# Patient Record
Sex: FEMALE | Race: WHITE | NOT HISPANIC OR LATINO | Employment: UNEMPLOYED | URBAN - METROPOLITAN AREA
[De-identification: names, ages, dates, MRNs, and addresses within clinical notes are randomized per-mention and may not be internally consistent; named-entity substitution may affect disease eponyms.]

---

## 2018-09-12 LAB
CARDIOLIPIN IGA SER IA-ACNC: <9 APL U/ML (ref 0–11)
CARDIOLIPIN IGG SER IA-ACNC: <9 GPL U/ML (ref 0–14)
CARDIOLIPIN IGM SER IA-ACNC: 14 MPL U/ML (ref 0–12)
HOMOCYSTEINE: 6.8 UMOL/L (ref 0–15)

## 2018-09-13 LAB
DRVVT CONFIRM: 38.5 SEC (ref 0–47)
FACT VIII SPEC-IMP: 233 % (ref 57–163)
LUPUS ANTICOAGULANT INTERPRETATION: NORMAL
PTT-LA MIX: 33.3 SEC (ref 0–51.9)

## 2018-09-14 LAB
B2 GLYCOPROTEIN I IGA: <9 GPI IGA UNITS (ref 0–25)
B2 GLYCOPROTEIN I IGG: <9 GPI IGG UNITS (ref 0–20)
B2 GLYCOPROTEIN I IGM: <9 GPI IGM UNITS (ref 0–32)

## 2018-09-24 ENCOUNTER — TELEPHONE (OUTPATIENT)
Dept: HEMATOLOGY/ONCOLOGY | Facility: CLINIC | Age: 63
End: 2018-09-24

## 2018-09-24 ENCOUNTER — OFFICE VISIT (OUTPATIENT)
Dept: HEMATOLOGY/ONCOLOGY | Facility: CLINIC | Age: 63
End: 2018-09-24
Payer: MEDICAID

## 2018-09-24 VITALS
WEIGHT: 197.88 LBS | HEART RATE: 120 BPM | DIASTOLIC BLOOD PRESSURE: 70 MMHG | HEIGHT: 65 IN | BODY MASS INDEX: 32.97 KG/M2 | SYSTOLIC BLOOD PRESSURE: 101 MMHG | TEMPERATURE: 99 F | RESPIRATION RATE: 18 BRPM

## 2018-09-24 DIAGNOSIS — R76.0 ANTICARDIOLIPIN ANTIBODY POSITIVE: ICD-10-CM

## 2018-09-24 DIAGNOSIS — R79.1 ELEVATED FACTOR VIII LEVEL: ICD-10-CM

## 2018-09-24 DIAGNOSIS — D73.5 SPLENIC INFARCT: Primary | ICD-10-CM

## 2018-09-24 PROCEDURE — 99213 OFFICE O/P EST LOW 20 MIN: CPT | Mod: ,,, | Performed by: INTERNAL MEDICINE

## 2018-09-24 RX ORDER — ALBUTEROL SULFATE 0.83 MG/ML
2.5 SOLUTION RESPIRATORY (INHALATION) EVERY 6 HOURS PRN
COMMUNITY

## 2018-09-24 RX ORDER — LEVOTHYROXINE SODIUM 125 UG/1
125 TABLET ORAL DAILY
Refills: 2 | COMMUNITY
Start: 2018-08-04 | End: 2018-11-26

## 2018-09-24 RX ORDER — ACETAMINOPHEN 500 MG
500 TABLET ORAL EVERY 6 HOURS PRN
COMMUNITY

## 2018-09-24 RX ORDER — APIXABAN 5 MG/1
TABLET, FILM COATED ORAL
Refills: 0 | COMMUNITY
Start: 2018-09-11 | End: 2020-07-21

## 2018-09-24 NOTE — LETTER
September 24, 2018      Ruby Chahal, FNP  [address] [facility] [address]           [facility] - Hematology Oncology  [address]  [address]  [place] [address]  Phone: [phone]  Fax: [phone]          Patient: Clarita Gómez   MR Number: [id number]   YOB: 1955   Date of Visit: 9/24/2018       Dear Ruby Chahal:    Thank you for referring Clarita Gómez to me for evaluation. Attached you will find relevant portions of my assessment and plan of care.    If you have questions, please do not hesitate to call me. I look forward to following Clarita Gómez along with you.    Sincerely,    GUERO Mayer MD    Enclosure  CC:  No Recipients    If you would like to receive this communication electronically, please contact [email or web address] or [phone] to request more information on [employer] Link access.    For providers and/or their staff who would like to refer a patient to [facility], please contact us through our one-stop-shop provider referral line, [facility] , at [phone].    If you feel you have received this communication in error or would no longer like to receive these types of communications, please e-mail [email or web address]

## 2018-09-25 NOTE — PROGRESS NOTES
Presented with LUQ pain.  Had splenic artery thrombus with splenic infarct.  Pain 10 to 5 since discharge.  On Eliquis 5mg BID.    Coagulation evaluation increased Factor 8 and increased Anti cardiolipid antibody levels.    Estimate 30% risk of clot event over next 2-3 years.    Continue Eliquis 5mg BID therapeutic and prophylaxis.

## 2018-11-26 ENCOUNTER — OFFICE VISIT (OUTPATIENT)
Dept: HEMATOLOGY/ONCOLOGY | Facility: CLINIC | Age: 63
End: 2018-11-26
Payer: MEDICAID

## 2018-11-26 VITALS — RESPIRATION RATE: 20 BRPM | BODY MASS INDEX: 34.7 KG/M2 | TEMPERATURE: 98 F | WEIGHT: 208.5 LBS

## 2018-11-26 DIAGNOSIS — R76.0 ANTICARDIOLIPIN ANTIBODY POSITIVE: ICD-10-CM

## 2018-11-26 DIAGNOSIS — R76.0 ANTI-CARDIOLIPIN ANTIBODY POSITIVE: ICD-10-CM

## 2018-11-26 DIAGNOSIS — R79.1 ELEVATED FACTOR VIII LEVEL: ICD-10-CM

## 2018-11-26 DIAGNOSIS — D73.5 SPLENIC INFARCT: Primary | ICD-10-CM

## 2018-11-26 PROCEDURE — 99213 OFFICE O/P EST LOW 20 MIN: CPT | Mod: ,,, | Performed by: INTERNAL MEDICINE

## 2018-11-26 RX ORDER — LEVOTHYROXINE SODIUM 112 UG/1
TABLET ORAL
COMMUNITY

## 2018-11-26 NOTE — LETTER
November 26, 2018      Ruby Chahal, FNP  [address] [facility] [address]           [facility] - Hematology Oncology  [address]  [address]  [place] [address]  Phone: [phone]  Fax: [phone]          Patient: Clarita Gómez   MR Number: [id number]   YOB: 1955   Date of Visit: 11/26/2018       Dear Ruby Chahal:    Thank you for referring Clarita óGmez to me for evaluation. Attached you will find relevant portions of my assessment and plan of care.    If you have questions, please do not hesitate to call me. I look forward to following Clarita Gómez along with you.    Sincerely,    GUERO Mayer MD    Enclosure  CC:  No Recipients    If you would like to receive this communication electronically, please contact [email or web address] or [phone] to request more information on [employer] Link access.    For providers and/or their staff who would like to refer a patient to [facility], please contact us through our one-stop-shop provider referral line, [facility] , at [phone].    If you feel you have received this communication in error or would no longer like to receive these types of communications, please e-mail [email or web address]

## 2018-11-27 NOTE — PROGRESS NOTES
Presented with LUQ pain.  Had splenic artery thrombus with splenic infarct.  Pain 10 to 5 since discharge.  On Eliquis 5mg BID.    Coagulation evaluation increased Factor 8 and increased Anti cardiolipid antibody levels.    Estimate 30% risk of clot event over next 2-3 years.    Continue Eliquis 5mg BID therapeutic and prophylaxis.    She admits to blurred vision and dizzyness intermittently, sx predated the splenic infarct and have not changed while on Eliquis.    She is to follow up with Ms. Chahal for evaluation of sx. and primary care.    Plan to re check her CAT of abdomen and Factor 8 and anti cardiolipid antibody levels again 3/2019.    RTC 3/2019.

## 2019-03-15 ENCOUNTER — TELEPHONE (OUTPATIENT)
Dept: HEMATOLOGY/ONCOLOGY | Facility: CLINIC | Age: 64
End: 2019-03-15

## 2019-03-15 DIAGNOSIS — D73.5 SPLENIC INFARCT: ICD-10-CM

## 2019-03-15 DIAGNOSIS — R76.0 ANTI-CARDIOLIPIN ANTIBODY POSITIVE: Primary | ICD-10-CM

## 2019-03-15 NOTE — TELEPHONE ENCOUNTER
----- Message from Freya Lucero sent at 3/15/2019  1:51 PM CDT -----  Factor 8 and anti cardiolipid antibody levels -- labs need to be input for [facility] so pt can do labs prior to appt.    # [phone]

## 2019-03-20 ENCOUNTER — OFFICE VISIT (OUTPATIENT)
Dept: HEMATOLOGY/ONCOLOGY | Facility: CLINIC | Age: 64
End: 2019-03-20
Payer: MEDICAID

## 2019-03-20 VITALS
HEART RATE: 80 BPM | TEMPERATURE: 98 F | WEIGHT: 215.31 LBS | BODY MASS INDEX: 35.83 KG/M2 | SYSTOLIC BLOOD PRESSURE: 123 MMHG | DIASTOLIC BLOOD PRESSURE: 77 MMHG | RESPIRATION RATE: 20 BRPM

## 2019-03-20 DIAGNOSIS — D73.5 SPLENIC INFARCT: Primary | ICD-10-CM

## 2019-03-20 DIAGNOSIS — R10.12 LEFT UPPER QUADRANT PAIN: ICD-10-CM

## 2019-03-20 PROCEDURE — 99213 PR OFFICE/OUTPT VISIT, EST, LEVL III, 20-29 MIN: ICD-10-PCS | Mod: ,,, | Performed by: INTERNAL MEDICINE

## 2019-03-20 PROCEDURE — 99213 OFFICE O/P EST LOW 20 MIN: CPT | Mod: ,,, | Performed by: INTERNAL MEDICINE

## 2019-03-20 RX ORDER — FLUTICASONE FUROATE AND VILANTEROL 200; 25 UG/1; UG/1
1 POWDER RESPIRATORY (INHALATION) DAILY
COMMUNITY
End: 2021-03-31

## 2019-03-20 RX ORDER — PRAVASTATIN SODIUM 40 MG/1
40 TABLET ORAL DAILY
COMMUNITY
End: 2022-01-17 | Stop reason: SDUPTHER

## 2019-03-20 NOTE — LETTER
March 20, 2019      Ruby Chahal, FNP  [address]  [address]  [place] [address]           [facility] - Hematology Oncology  [address]  [address]  [place] [address]  Phone: [phone]  Fax: [phone]          Patient: Clarita Gómez   MR Number: [id number]   YOB: 1955   Date of Visit: 3/20/2019       Dear Ruby Chahal:    Thank you for referring Clarita Gómez to me for evaluation. Attached you will find relevant portions of my assessment and plan of care.    If you have questions, please do not hesitate to call me. I look forward to following Clarita Gómez along with you.    Sincerely,    GUERO Mayer MD    Enclosure  CC:  No Recipients    If you would like to receive this communication electronically, please contact [email or web address] or [phone] to request more information on [employer] access.    For providers and/or their staff who would like to refer a patient to [facility], please contact us through our one-stop-shop provider referral line, [facility], at [phone].    If you feel you have received this communication in error or would no longer like to receive these types of communications, please e-mail [email or web address]

## 2019-03-21 NOTE — PROGRESS NOTES
Presented with LUQ pain.  Had splenic artery thrombus with splenic infarct.  Pain 10 to 5 since discharge.  On Eliquis 5mg BID.    Coagulation evaluation increased Factor 8 and increased Anti cardiolipid antibody levels.    Estimate 30% risk of clot event over next 2-3 years.    Continue Eliquis 5mg BID therapeutic and prophylaxis.    She admits to blurred vision and dizzyness intermittently, sx predated the splenic infarct and have not changed while on Eliquis.    LUQ pain lasting 3-4 days, in 12/2018 and 2/2019., while on Eliquis 5mg BID.    Plan to re check her CAT of abdomen now regards status of splenic infarct.    RTC 2-3 weeks.

## 2019-03-25 ENCOUNTER — TELEPHONE (OUTPATIENT)
Dept: HEMATOLOGY/ONCOLOGY | Facility: CLINIC | Age: 64
End: 2019-03-25

## 2019-03-25 NOTE — TELEPHONE ENCOUNTER
Called patient to see if she completed CT and labs she stated that the insurance didn't get approved. She will call to reschedule her apppointment and she just spoke to someone at the office and canceled her appointment for Friday.

## 2019-04-11 LAB
ALBUMIN SERPL-MCNC: 4.4 G/DL (ref 3.1–4.7)
ALP SERPL-CCNC: 64 IU/L (ref 40–104)
ALT (SGPT): 21 IU/L (ref 3–33)
AST SERPL-CCNC: 26 IU/L (ref 10–40)
BASOPHILS NFR BLD: 0.1 K/UL (ref 0–0.2)
BASOPHILS NFR BLD: 0.8 %
BILIRUB SERPL-MCNC: 0.6 MG/DL (ref 0.3–1)
BUN SERPL-MCNC: 11 MG/DL (ref 8–20)
CALCIUM SERPL-MCNC: 9.7 MG/DL (ref 7.7–10.4)
CHLORIDE: 97 MMOL/L (ref 98–110)
CO2 SERPL-SCNC: 28.1 MMOL/L (ref 22.8–31.6)
CREATININE: 0.86 MG/DL (ref 0.6–1.4)
EOSINOPHIL NFR BLD: 0.5 K/UL (ref 0–0.7)
EOSINOPHIL NFR BLD: 5 %
ERYTHROCYTE [DISTWIDTH] IN BLOOD BY AUTOMATED COUNT: 13.6 % (ref 11.7–14.9)
GLUCOSE: 87 MG/DL (ref 70–99)
GRAN #: 5.3 K/UL (ref 1.4–6.5)
GRAN%: 58.3 %
HCT VFR BLD AUTO: 45.9 % (ref 36–48)
HGB BLD-MCNC: 14.7 G/DL (ref 12–15)
IMMATURE GRANS (ABS): 0 K/UL (ref 0–1)
IMMATURE GRANULOCYTES: 0.4 %
ISTAT CREATININE: 1 MG/DL (ref 0.6–1.4)
LYMPH #: 2.5 K/UL (ref 1.2–3.4)
LYMPH%: 27.6 %
MCH RBC QN AUTO: 29.5 PG (ref 25–35)
MCHC RBC AUTO-ENTMCNC: 32 G/DL (ref 31–36)
MCV RBC AUTO: 92 FL (ref 79–98)
MONO #: 0.7 K/UL (ref 0.1–0.6)
MONO%: 7.9 %
NUCLEATED RBCS: 0 %
PLATELET # BLD AUTO: 307 K/UL (ref 140–440)
PMV BLD AUTO: 9.6 FL (ref 8.8–12.7)
POTASSIUM SERPL-SCNC: 4.1 MMOL/L (ref 3.5–5)
PROT SERPL-MCNC: 8.5 G/DL (ref 6–8.2)
RBC # BLD AUTO: 4.99 M/UL (ref 3.5–5.5)
SODIUM: 136 MMOL/L (ref 134–144)
WBC # BLD AUTO: 9.1 K/UL (ref 5–10)

## 2019-04-12 LAB — FACT VIII SPEC-IMP: 209 % (ref 57–163)

## 2019-04-23 ENCOUNTER — OFFICE VISIT (OUTPATIENT)
Dept: HEMATOLOGY/ONCOLOGY | Facility: CLINIC | Age: 64
End: 2019-04-23
Payer: MEDICAID

## 2019-04-23 VITALS
SYSTOLIC BLOOD PRESSURE: 129 MMHG | TEMPERATURE: 99 F | WEIGHT: 217.31 LBS | BODY MASS INDEX: 36.16 KG/M2 | HEART RATE: 89 BPM | DIASTOLIC BLOOD PRESSURE: 81 MMHG | RESPIRATION RATE: 20 BRPM

## 2019-04-23 DIAGNOSIS — R76.0 ANTI-CARDIOLIPIN ANTIBODY POSITIVE: ICD-10-CM

## 2019-04-23 DIAGNOSIS — R79.1 ELEVATED FACTOR VIII LEVEL: ICD-10-CM

## 2019-04-23 DIAGNOSIS — D73.5 SPLENIC INFARCT: Primary | ICD-10-CM

## 2019-04-23 PROCEDURE — 99213 PR OFFICE/OUTPT VISIT, EST, LEVL III, 20-29 MIN: ICD-10-PCS | Mod: ,,, | Performed by: INTERNAL MEDICINE

## 2019-04-23 PROCEDURE — 99213 OFFICE O/P EST LOW 20 MIN: CPT | Mod: ,,, | Performed by: INTERNAL MEDICINE

## 2019-04-23 NOTE — LETTER
April 28, 2019      Ruby Chahal, FNP  [address]  [address]  [place] [address]           [facility] - Hematology Oncology  [address]  [address]  [place] [address]  Phone: [phone]  Fax: [phone]          Patient: Clarita Gómez   MR Number: [id number]   YOB: 1955   Date of Visit: 4/23/2019       Dear Ruby Chahal:    Thank you for referring Clarita Gómez to me for evaluation. Attached you will find relevant portions of my assessment and plan of care.    If you have questions, please do not hesitate to call me. I look forward to following Clarita Gómez along with you.    Sincerely,    GUERO Mayer MD    Enclosure  CC:  No Recipients    If you would like to receive this communication electronically, please contact [email or web address] or [phone] to request more information on [employer] Link access.    For providers and/or their staff who would like to refer a patient to [facility], please contact us through our one-stop-shop provider referral line, [facility], at [phone].    If you feel you have received this communication in error or would no longer like to receive these types of communications, please e-mail [email or web address]

## 2019-04-28 NOTE — PROGRESS NOTES
Presented with LUQ pain.  Had splenic artery thrombus with splenic infarct.  Pain 10 to 5 since discharge.  On Eliquis 5mg BID.    Coagulation evaluation increased Factor 8 and increased Anti cardiolipid antibody levels.    Estimate 30% risk of clot event over next 2-3 years.    Continue Eliquis 5mg BID therapeutic and prophylaxis.    She admits to blurred vision and dizzyness intermittently, sx predated the splenic infarct and have not changed while on Eliquis.    LUQ pain lasting 3-4 days, in 12/2018 and 2/2019., while on Eliquis 5mg BID.    Plan to re check her CAT of abdomen now regards status of splenic infarct showed resolution of splenic infarct area.    Continue Eliquis BID.  RTC 6 months.

## 2019-05-14 RX ORDER — APIXABAN 5 MG/1
TABLET, FILM COATED ORAL
Qty: 60 TABLET | Refills: 0 | OUTPATIENT
Start: 2019-05-14

## 2019-05-14 NOTE — TELEPHONE ENCOUNTER
----- Message from Shasta Al sent at 5/14/2019  2:48 PM CDT -----  The patient called and said she needs a refill of her Eliquis called in to [facility] on [address]. She said they have been faxing a request but we have not responded. Please call her back once it is called in to the pharmacy at [phone].

## 2019-07-13 LAB
CARDIOLIPIN IGA SER IA-ACNC: <9 APL U/ML (ref 0–11)
CARDIOLIPIN IGG SER IA-ACNC: <9 GPL U/ML (ref 0–14)
CARDIOLIPIN IGM SER IA-ACNC: 18 MPL U/ML (ref 0–12)

## 2019-07-14 LAB — FACT VIII SPEC-IMP: 202 % (ref 56–140)

## 2019-07-23 ENCOUNTER — OFFICE VISIT (OUTPATIENT)
Dept: HEMATOLOGY/ONCOLOGY | Facility: CLINIC | Age: 64
End: 2019-07-23
Payer: MEDICAID

## 2019-07-23 VITALS
RESPIRATION RATE: 20 BRPM | DIASTOLIC BLOOD PRESSURE: 82 MMHG | WEIGHT: 214.5 LBS | BODY MASS INDEX: 35.69 KG/M2 | TEMPERATURE: 98 F | HEART RATE: 91 BPM | SYSTOLIC BLOOD PRESSURE: 136 MMHG

## 2019-07-23 DIAGNOSIS — R10.12 LEFT UPPER QUADRANT PAIN: ICD-10-CM

## 2019-07-23 DIAGNOSIS — D73.5 SPLENIC INFARCT: Primary | ICD-10-CM

## 2019-07-23 DIAGNOSIS — R76.0 ANTI-CARDIOLIPIN ANTIBODY POSITIVE: ICD-10-CM

## 2019-07-23 DIAGNOSIS — R79.1 ELEVATED FACTOR VIII LEVEL: ICD-10-CM

## 2019-07-23 PROCEDURE — 99213 PR OFFICE/OUTPT VISIT, EST, LEVL III, 20-29 MIN: ICD-10-PCS | Mod: ,,, | Performed by: INTERNAL MEDICINE

## 2019-07-23 PROCEDURE — 99213 OFFICE O/P EST LOW 20 MIN: CPT | Mod: ,,, | Performed by: INTERNAL MEDICINE

## 2019-07-24 NOTE — PROGRESS NOTES
Presented with LUQ pain.  Had splenic artery thrombus with splenic infarct.  Pain 10 to 5 since discharge.  Continues to improve.  On Eliquis 5mg BID.    Coagulation evaluation increased Factor 8 and increased Anti cardiolipid IgM antibody levels.  Persistently increased on repeat determination.    Estimate 30% risk of clot event over next 2-3 years.    Continue Eliquis 5mg BID therapeutic and prophylaxis.    She admits to blurred vision and dizzyness intermittently, sx predated the splenic infarct and have not changed while on Eliquis.      Re check her CAT of abdomen  regards status of splenic infarct showed resolution of splenic infarct area.    Continue Eliquis BID.  RTC 3 months.

## 2019-11-25 ENCOUNTER — OFFICE VISIT (OUTPATIENT)
Dept: HEMATOLOGY/ONCOLOGY | Facility: CLINIC | Age: 64
End: 2019-11-25
Payer: MEDICAID

## 2019-11-25 VITALS
BODY MASS INDEX: 36.44 KG/M2 | WEIGHT: 219 LBS | RESPIRATION RATE: 18 BRPM | DIASTOLIC BLOOD PRESSURE: 83 MMHG | SYSTOLIC BLOOD PRESSURE: 134 MMHG | TEMPERATURE: 98 F | HEART RATE: 92 BPM

## 2019-11-25 DIAGNOSIS — R10.12 LEFT UPPER QUADRANT PAIN: ICD-10-CM

## 2019-11-25 DIAGNOSIS — R76.0 ANTI-CARDIOLIPIN ANTIBODY POSITIVE: ICD-10-CM

## 2019-11-25 DIAGNOSIS — D73.5 SPLENIC INFARCT: Primary | ICD-10-CM

## 2019-11-25 DIAGNOSIS — R79.1 ELEVATED FACTOR VIII LEVEL: ICD-10-CM

## 2019-11-25 PROCEDURE — 99213 OFFICE O/P EST LOW 20 MIN: CPT | Mod: S$GLB,,, | Performed by: INTERNAL MEDICINE

## 2019-11-25 PROCEDURE — 99213 PR OFFICE/OUTPT VISIT, EST, LEVL III, 20-29 MIN: ICD-10-PCS | Mod: S$GLB,,, | Performed by: INTERNAL MEDICINE

## 2019-11-25 NOTE — PROGRESS NOTES
Presented with LUQ pain.  Had splenic artery thrombus with splenic infarct.  Continues to improve.  She does get some intermittent left upper quadrant pain.  On Eliquis 5mg BID.    Coagulation evaluation increased Factor 8 and increased Anti cardiolipid IgM antibody levels.  Persistently increased on repeat determination.    Estimate 30% risk of clot event over next 2-3 years.    Continue Eliquis 5mg BID therapeutic and prophylaxis.    She admits to blurred vision and dizzyness intermittently, sx predated the splenic infarct and have not changed while on Eliquis.      Re check her CAT of abdomen  regards status of splenic infarct showed resolution of splenic infarct area.    Continue Eliquis BID.      She is seen nurse practitioner Gabriel for general care and for thyroid management.  She follows up here in 6 months.

## 2020-01-02 DIAGNOSIS — Z78.0 ASYMPTOMATIC MENOPAUSAL STATE: Primary | ICD-10-CM

## 2020-01-02 DIAGNOSIS — Z12.31 ENCOUNTER FOR SCREENING MAMMOGRAM FOR BREAST CANCER: Primary | ICD-10-CM

## 2020-01-03 ENCOUNTER — HOSPITAL ENCOUNTER (OUTPATIENT)
Dept: RADIOLOGY | Facility: HOSPITAL | Age: 65
Discharge: HOME | End: 2020-01-03
Attending: NURSE PRACTITIONER
Payer: MEDICARE

## 2020-01-03 VITALS — HEIGHT: 65 IN | WEIGHT: 218.94 LBS | BODY MASS INDEX: 36.48 KG/M2

## 2020-01-03 DIAGNOSIS — Z12.31 ENCOUNTER FOR SCREENING MAMMOGRAM FOR BREAST CANCER: ICD-10-CM

## 2020-01-03 PROCEDURE — 77067 SCR MAMMO BI INCL CAD: CPT | Mod: TC,PO

## 2020-02-10 ENCOUNTER — HOSPITAL ENCOUNTER (OUTPATIENT)
Dept: RADIOLOGY | Facility: HOSPITAL | Age: 65
Discharge: HOME | End: 2020-02-10
Attending: NURSE PRACTITIONER
Payer: MEDICARE

## 2020-02-10 DIAGNOSIS — Z78.0 ASYMPTOMATIC MENOPAUSAL STATE: ICD-10-CM

## 2020-02-10 PROCEDURE — 77080 DXA BONE DENSITY AXIAL: CPT | Mod: TC,PO

## 2020-06-15 ENCOUNTER — TELEPHONE (OUTPATIENT)
Dept: HEMATOLOGY/ONCOLOGY | Facility: CLINIC | Age: 65
End: 2020-06-15

## 2020-06-15 NOTE — TELEPHONE ENCOUNTER
----- Message from Shasta Al sent at 6/15/2020  9:55 AM CDT -----  The patient needs her Eliquis called in to [facility]. Please call the patient back at [phone].

## 2020-06-24 ENCOUNTER — LAB VISIT (OUTPATIENT)
Dept: LAB | Facility: HOSPITAL | Age: 65
End: 2020-06-24
Attending: INTERNAL MEDICINE
Payer: MEDICARE

## 2020-06-24 ENCOUNTER — OFFICE VISIT (OUTPATIENT)
Dept: HEMATOLOGY/ONCOLOGY | Facility: CLINIC | Age: 65
End: 2020-06-24
Payer: MEDICARE

## 2020-06-24 ENCOUNTER — TELEPHONE (OUTPATIENT)
Dept: HEMATOLOGY/ONCOLOGY | Facility: CLINIC | Age: 65
End: 2020-06-24

## 2020-06-24 VITALS
TEMPERATURE: 97 F | RESPIRATION RATE: 12 BRPM | WEIGHT: 230.38 LBS | SYSTOLIC BLOOD PRESSURE: 160 MMHG | HEART RATE: 93 BPM | BODY MASS INDEX: 38.34 KG/M2 | DIASTOLIC BLOOD PRESSURE: 80 MMHG

## 2020-06-24 DIAGNOSIS — D73.5 SPLENIC INFARCT: ICD-10-CM

## 2020-06-24 DIAGNOSIS — R76.0 ANTI-CARDIOLIPIN ANTIBODY POSITIVE: Primary | ICD-10-CM

## 2020-06-24 DIAGNOSIS — R76.0 ANTI-CARDIOLIPIN ANTIBODY POSITIVE: ICD-10-CM

## 2020-06-24 PROCEDURE — 99214 OFFICE O/P EST MOD 30 MIN: CPT | Mod: S$GLB,,, | Performed by: INTERNAL MEDICINE

## 2020-06-24 PROCEDURE — 86147 CARDIOLIPIN ANTIBODY EA IG: CPT | Mod: 59

## 2020-06-24 PROCEDURE — 85240 CLOT FACTOR VIII AHG 1 STAGE: CPT

## 2020-06-24 PROCEDURE — 99214 PR OFFICE/OUTPT VISIT, EST, LEVL IV, 30-39 MIN: ICD-10-PCS | Mod: S$GLB,,, | Performed by: INTERNAL MEDICINE

## 2020-06-24 PROCEDURE — 36415 COLL VENOUS BLD VENIPUNCTURE: CPT

## 2020-06-27 LAB — FACT VIII ACT/NOR PPP: 250 % (ref 56–140)

## 2020-06-28 LAB
CARDIOLIPIN IGA SER IA-ACNC: 13 MPL U/ML (ref 0–12)
CARDIOLIPIN IGG SER IA-ACNC: <9 GPL U/ML (ref 0–14)
CARDIOLIPIN IGM SER IA-ACNC: <9 APL U/ML (ref 0–11)

## 2020-06-28 NOTE — PROGRESS NOTES
[facility] hematology Oncology in office follow-up progress note  June 24 2020    C/o left upper quadrant pain      Presented with LUQ pain.  Had splenic artery thrombus with splenic infarct.  Continues to improve.  She does get some intermittent left upper quadrant pain.  On Eliquis 5mg BID.    Coagulation evaluation increased Factor 8 and increased Anti cardiolipid IgM antibody levels.  Persistently increased on repeat determination.  Recheck lab in 6 months at [facility].    Estimate 30% risk of clot event over next 2-3 years.    Continue Eliquis 5mg BID therapeutic and prophylaxis.    She admits to blurred vision and dizzyness intermittently, sx predated the splenic infarct and have not changed while on Eliquis.    History of COPD.      Re check her CAT of abdomen  regards status of splenic infarct showed resolution of splenic infarct area.    Continue Eliquis BID.      She is seen nurse practitioner Gabriel for general care and for thyroid management.     No acute distress, does not appear chronically ill.  She does not have palpable lymphadenopathy.  Her lungs are clear bilaterally.  She has regular rhythm without murmur.  Abdomen is flat without distention, abdomen is nontender without palpable hepatomegaly or splenomegaly or abdominal mass.  Calves are nontender without edema, no petechiae or purpura.  Neurologically grossly intact    History of splenic infarct with  Increased factor 8 and increased anticardiolipin antibody in the past.    Continue Eliquis 5 mg b.i.d..  Return to clinic with labs in 6 months.

## 2021-01-19 ENCOUNTER — OFFICE VISIT (OUTPATIENT)
Dept: HEMATOLOGY/ONCOLOGY | Facility: CLINIC | Age: 66
End: 2021-01-19
Payer: MEDICARE

## 2021-01-19 VITALS
BODY MASS INDEX: 39.51 KG/M2 | RESPIRATION RATE: 20 BRPM | WEIGHT: 237.38 LBS | HEART RATE: 98 BPM | TEMPERATURE: 98 F | SYSTOLIC BLOOD PRESSURE: 116 MMHG | DIASTOLIC BLOOD PRESSURE: 69 MMHG

## 2021-01-19 DIAGNOSIS — D73.5 SPLENIC INFARCT: ICD-10-CM

## 2021-01-19 DIAGNOSIS — R79.1 ELEVATED FACTOR VIII LEVEL: ICD-10-CM

## 2021-01-19 DIAGNOSIS — R10.12 LEFT UPPER QUADRANT PAIN: ICD-10-CM

## 2021-01-19 DIAGNOSIS — Z12.31 SCREENING MAMMOGRAM FOR HIGH-RISK PATIENT: Primary | ICD-10-CM

## 2021-01-19 DIAGNOSIS — R76.0 ANTI-CARDIOLIPIN ANTIBODY POSITIVE: Primary | ICD-10-CM

## 2021-01-19 PROCEDURE — 99214 OFFICE O/P EST MOD 30 MIN: CPT | Mod: S$GLB,,, | Performed by: INTERNAL MEDICINE

## 2021-01-19 PROCEDURE — 99214 PR OFFICE/OUTPT VISIT, EST, LEVL IV, 30-39 MIN: ICD-10-PCS | Mod: S$GLB,,, | Performed by: INTERNAL MEDICINE

## 2021-03-31 ENCOUNTER — OFFICE VISIT (OUTPATIENT)
Dept: PULMONOLOGY | Facility: CLINIC | Age: 66
End: 2021-03-31
Payer: MEDICARE

## 2021-03-31 DIAGNOSIS — J44.9 CHRONIC OBSTRUCTIVE PULMONARY DISEASE, UNSPECIFIED COPD TYPE: ICD-10-CM

## 2021-03-31 DIAGNOSIS — R76.0 ANTI-CARDIOLIPIN ANTIBODY POSITIVE: ICD-10-CM

## 2021-03-31 DIAGNOSIS — Z87.891 PERSONAL HISTORY OF TOBACCO USE, PRESENTING HAZARDS TO HEALTH: ICD-10-CM

## 2021-03-31 PROCEDURE — 99406 BEHAV CHNG SMOKING 3-10 MIN: CPT | Mod: ,,, | Performed by: INTERNAL MEDICINE

## 2021-03-31 PROCEDURE — 99204 OFFICE O/P NEW MOD 45 MIN: CPT | Mod: 25,S$GLB,, | Performed by: INTERNAL MEDICINE

## 2021-03-31 PROCEDURE — 99204 PR OFFICE/OUTPT VISIT, NEW, LEVL IV, 45-59 MIN: ICD-10-PCS | Mod: 25,S$GLB,, | Performed by: INTERNAL MEDICINE

## 2021-03-31 PROCEDURE — 99406 PR TOBACCO USE CESSATION INTERMEDIATE 3-10 MINUTES: ICD-10-PCS | Mod: ,,, | Performed by: INTERNAL MEDICINE

## 2021-03-31 RX ORDER — ERGOCALCIFEROL 1.25 MG/1
50000 CAPSULE ORAL
COMMUNITY
Start: 2021-03-15 | End: 2023-12-22

## 2021-03-31 RX ORDER — PRAVASTATIN SODIUM 40 MG/1
TABLET ORAL
COMMUNITY

## 2021-03-31 RX ORDER — BUDESONIDE AND FORMOTEROL FUMARATE DIHYDRATE 160; 4.5 UG/1; UG/1
2 AEROSOL RESPIRATORY (INHALATION) EVERY 12 HOURS
COMMUNITY
End: 2021-06-07 | Stop reason: ALTCHOICE

## 2021-04-01 DIAGNOSIS — Z12.31 ENCOUNTER FOR SCREENING MAMMOGRAM FOR MALIGNANT NEOPLASM OF BREAST: Primary | ICD-10-CM

## 2021-04-15 ENCOUNTER — HOSPITAL ENCOUNTER (OUTPATIENT)
Dept: RADIOLOGY | Facility: HOSPITAL | Age: 66
Discharge: HOME | End: 2021-04-15
Attending: INTERNAL MEDICINE
Payer: MEDICARE

## 2021-04-15 ENCOUNTER — HOSPITAL ENCOUNTER (OUTPATIENT)
Dept: RADIOLOGY | Facility: HOSPITAL | Age: 66
Discharge: HOME | End: 2021-04-15
Attending: NURSE PRACTITIONER
Payer: MEDICARE

## 2021-04-15 DIAGNOSIS — Z87.891 PERSONAL HISTORY OF TOBACCO USE, PRESENTING HAZARDS TO HEALTH: ICD-10-CM

## 2021-04-15 DIAGNOSIS — Z12.31 ENCOUNTER FOR SCREENING MAMMOGRAM FOR MALIGNANT NEOPLASM OF BREAST: ICD-10-CM

## 2021-04-15 PROCEDURE — 77067 SCR MAMMO BI INCL CAD: CPT | Mod: TC,PO

## 2021-04-15 PROCEDURE — 71271 CT THORAX LUNG CANCER SCR C-: CPT | Mod: TC,PO

## 2021-05-05 ENCOUNTER — HOSPITAL ENCOUNTER (OUTPATIENT)
Dept: PULMONOLOGY | Facility: HOSPITAL | Age: 66
Discharge: HOME | End: 2021-05-05
Attending: INTERNAL MEDICINE
Payer: MEDICARE

## 2021-05-05 VITALS — BODY MASS INDEX: 38.49 KG/M2 | WEIGHT: 231 LBS | HEIGHT: 65 IN

## 2021-05-05 DIAGNOSIS — J44.9 CHRONIC OBSTRUCTIVE PULMONARY DISEASE, UNSPECIFIED COPD TYPE: ICD-10-CM

## 2021-05-05 PROCEDURE — 94727 GAS DIL/WSHOT DETER LNG VOL: CPT

## 2021-05-05 PROCEDURE — 94729 DIFFUSING CAPACITY: CPT

## 2021-05-05 PROCEDURE — 94060 EVALUATION OF WHEEZING: CPT

## 2021-05-05 PROCEDURE — 94618 PULMONARY STRESS TESTING: CPT

## 2021-05-05 PROCEDURE — 94010 BREATHING CAPACITY TEST: CPT

## 2021-05-10 ENCOUNTER — OFFICE VISIT (OUTPATIENT)
Dept: PULMONOLOGY | Facility: CLINIC | Age: 66
End: 2021-05-10
Payer: MEDICARE

## 2021-05-10 VITALS
DIASTOLIC BLOOD PRESSURE: 72 MMHG | BODY MASS INDEX: 36.28 KG/M2 | WEIGHT: 218 LBS | SYSTOLIC BLOOD PRESSURE: 140 MMHG | OXYGEN SATURATION: 94 % | HEART RATE: 94 BPM

## 2021-05-10 DIAGNOSIS — R09.02 HYPOXEMIA: ICD-10-CM

## 2021-05-10 DIAGNOSIS — J44.9 CHRONIC OBSTRUCTIVE PULMONARY DISEASE, UNSPECIFIED COPD TYPE: ICD-10-CM

## 2021-05-10 PROCEDURE — 99214 OFFICE O/P EST MOD 30 MIN: CPT | Mod: S$GLB,,, | Performed by: INTERNAL MEDICINE

## 2021-05-10 PROCEDURE — 99214 PR OFFICE/OUTPT VISIT, EST, LEVL IV, 30-39 MIN: ICD-10-PCS | Mod: S$GLB,,, | Performed by: INTERNAL MEDICINE

## 2021-06-07 ENCOUNTER — TELEPHONE (OUTPATIENT)
Dept: PULMONOLOGY | Facility: CLINIC | Age: 66
End: 2021-06-07

## 2021-07-19 ENCOUNTER — OFFICE VISIT (OUTPATIENT)
Dept: HEMATOLOGY/ONCOLOGY | Facility: CLINIC | Age: 66
End: 2021-07-19
Payer: MEDICARE

## 2021-07-19 VITALS
TEMPERATURE: 98 F | DIASTOLIC BLOOD PRESSURE: 72 MMHG | HEART RATE: 99 BPM | BODY MASS INDEX: 36.91 KG/M2 | WEIGHT: 221.81 LBS | SYSTOLIC BLOOD PRESSURE: 107 MMHG | RESPIRATION RATE: 18 BRPM

## 2021-07-19 DIAGNOSIS — D73.5 SPLENIC INFARCT: Primary | ICD-10-CM

## 2021-07-19 DIAGNOSIS — J44.9 CHRONIC OBSTRUCTIVE PULMONARY DISEASE, UNSPECIFIED COPD TYPE: ICD-10-CM

## 2021-07-19 DIAGNOSIS — R79.1 ELEVATED FACTOR VIII LEVEL: ICD-10-CM

## 2021-07-19 DIAGNOSIS — R76.0 ANTI-CARDIOLIPIN ANTIBODY POSITIVE: ICD-10-CM

## 2021-07-19 PROCEDURE — 99214 OFFICE O/P EST MOD 30 MIN: CPT | Mod: S$GLB,,, | Performed by: INTERNAL MEDICINE

## 2021-07-19 PROCEDURE — 99214 PR OFFICE/OUTPT VISIT, EST, LEVL IV, 30-39 MIN: ICD-10-PCS | Mod: S$GLB,,, | Performed by: INTERNAL MEDICINE

## 2021-08-16 ENCOUNTER — OFFICE VISIT (OUTPATIENT)
Dept: PULMONOLOGY | Facility: CLINIC | Age: 66
End: 2021-08-16
Payer: MEDICARE

## 2021-08-16 VITALS
DIASTOLIC BLOOD PRESSURE: 58 MMHG | WEIGHT: 224 LBS | BODY MASS INDEX: 37.28 KG/M2 | OXYGEN SATURATION: 96 % | HEART RATE: 92 BPM | SYSTOLIC BLOOD PRESSURE: 100 MMHG

## 2021-08-16 DIAGNOSIS — J44.9 CHRONIC OBSTRUCTIVE PULMONARY DISEASE, UNSPECIFIED COPD TYPE: ICD-10-CM

## 2021-08-16 DIAGNOSIS — Z87.891 PERSONAL HISTORY OF TOBACCO USE, PRESENTING HAZARDS TO HEALTH: ICD-10-CM

## 2021-08-16 DIAGNOSIS — R09.02 HYPOXEMIA: ICD-10-CM

## 2021-08-16 PROCEDURE — 99214 PR OFFICE/OUTPT VISIT, EST, LEVL IV, 30-39 MIN: ICD-10-PCS | Mod: 25,S$GLB,, | Performed by: INTERNAL MEDICINE

## 2021-08-16 PROCEDURE — 99406 BEHAV CHNG SMOKING 3-10 MIN: CPT | Mod: ,,, | Performed by: INTERNAL MEDICINE

## 2021-08-16 PROCEDURE — 99406 PR TOBACCO USE CESSATION INTERMEDIATE 3-10 MINUTES: ICD-10-PCS | Mod: ,,, | Performed by: INTERNAL MEDICINE

## 2021-08-16 PROCEDURE — 99214 OFFICE O/P EST MOD 30 MIN: CPT | Mod: 25,S$GLB,, | Performed by: INTERNAL MEDICINE

## 2021-11-15 ENCOUNTER — OFFICE VISIT (OUTPATIENT)
Dept: PULMONOLOGY | Facility: CLINIC | Age: 66
End: 2021-11-15
Payer: MEDICARE

## 2021-11-15 VITALS
OXYGEN SATURATION: 97 % | WEIGHT: 222 LBS | SYSTOLIC BLOOD PRESSURE: 108 MMHG | BODY MASS INDEX: 36.94 KG/M2 | HEART RATE: 94 BPM | DIASTOLIC BLOOD PRESSURE: 62 MMHG

## 2021-11-15 DIAGNOSIS — J44.9 CHRONIC OBSTRUCTIVE PULMONARY DISEASE, UNSPECIFIED COPD TYPE: ICD-10-CM

## 2021-11-15 DIAGNOSIS — Z87.891 PERSONAL HISTORY OF TOBACCO USE, PRESENTING HAZARDS TO HEALTH: ICD-10-CM

## 2021-11-15 DIAGNOSIS — R09.02 HYPOXEMIA: ICD-10-CM

## 2021-11-15 PROCEDURE — 99214 PR OFFICE/OUTPT VISIT, EST, LEVL IV, 30-39 MIN: ICD-10-PCS | Mod: 25,S$GLB,, | Performed by: INTERNAL MEDICINE

## 2021-11-15 PROCEDURE — 90662 FLU VACCINE - QUADRIVALENT - HIGH DOSE (65+) PRESERVATIVE FREE IM: ICD-10-PCS | Mod: S$GLB,,, | Performed by: INTERNAL MEDICINE

## 2021-11-15 PROCEDURE — 90662 IIV NO PRSV INCREASED AG IM: CPT | Mod: S$GLB,,, | Performed by: INTERNAL MEDICINE

## 2021-11-15 PROCEDURE — 99214 OFFICE O/P EST MOD 30 MIN: CPT | Mod: 25,S$GLB,, | Performed by: INTERNAL MEDICINE

## 2021-11-15 PROCEDURE — G0008 FLU VACCINE - QUADRIVALENT - HIGH DOSE (65+) PRESERVATIVE FREE IM: ICD-10-PCS | Mod: S$GLB,,, | Performed by: INTERNAL MEDICINE

## 2021-11-15 PROCEDURE — G0008 ADMIN INFLUENZA VIRUS VAC: HCPCS | Mod: S$GLB,,, | Performed by: INTERNAL MEDICINE

## 2021-11-15 RX ORDER — NYSTATIN 100000 [USP'U]/ML
5 SUSPENSION ORAL 4 TIMES DAILY
Qty: 200 ML | Refills: 0 | Status: SHIPPED | OUTPATIENT
Start: 2021-11-15 | End: 2021-11-25

## 2021-11-29 ENCOUNTER — TELEPHONE (OUTPATIENT)
Dept: PULMONOLOGY | Facility: CLINIC | Age: 66
End: 2021-11-29
Payer: MEDICARE

## 2021-11-29 RX ORDER — BUDESONIDE, GLYCOPYRROLATE, AND FORMOTEROL FUMARATE 160; 9; 4.8 UG/1; UG/1; UG/1
2 AEROSOL, METERED RESPIRATORY (INHALATION) 2 TIMES DAILY
Qty: 10.7 G | Refills: 5 | Status: SHIPPED | OUTPATIENT
Start: 2021-11-29 | End: 2022-08-31 | Stop reason: SDUPTHER

## 2022-01-17 ENCOUNTER — OFFICE VISIT (OUTPATIENT)
Dept: HEMATOLOGY/ONCOLOGY | Facility: CLINIC | Age: 67
End: 2022-01-17
Payer: MEDICARE

## 2022-01-17 VITALS
WEIGHT: 223 LBS | SYSTOLIC BLOOD PRESSURE: 111 MMHG | HEART RATE: 78 BPM | BODY MASS INDEX: 35.84 KG/M2 | TEMPERATURE: 98 F | HEIGHT: 66 IN | RESPIRATION RATE: 20 BRPM | DIASTOLIC BLOOD PRESSURE: 67 MMHG

## 2022-01-17 DIAGNOSIS — D73.5 SPLENIC INFARCT: ICD-10-CM

## 2022-01-17 DIAGNOSIS — R76.0 ANTI-CARDIOLIPIN ANTIBODY POSITIVE: Primary | ICD-10-CM

## 2022-01-17 PROCEDURE — 99214 PR OFFICE/OUTPT VISIT, EST, LEVL IV, 30-39 MIN: ICD-10-PCS | Mod: S$GLB,,, | Performed by: INTERNAL MEDICINE

## 2022-01-17 PROCEDURE — 99214 OFFICE O/P EST MOD 30 MIN: CPT | Mod: S$GLB,,, | Performed by: INTERNAL MEDICINE

## 2022-01-17 RX ORDER — APIXABAN 5 MG/1
TABLET, FILM COATED ORAL 2 TIMES DAILY
COMMUNITY
Start: 2022-01-14 | End: 2022-06-15

## 2022-01-17 NOTE — PROGRESS NOTES
[facility] hematology Oncology in office subsequent encounter note  1/17/22  BRENDA Rios        Presented with LUQ pain.  Had splenic artery thrombus with splenic infarct.  On Eliquis 5mg BID.  No LUQ pain sx now.    Coagulation evaluation increased Factor 8 and increased Anti cardiolipid IgM antibody levels.  Persistently increased on repeat determination.    Estimate 30% risk of clot event over next 2-3 years.    Continue Eliquis 5mg BID therapeutic and prophylaxis.    She admits to blurred vision and dizzyness intermittently, sx predated the splenic infarct and have not changed while on Eliquis.    History of COPD.  On 02 24/7.      Re check her CAT of abdomen  regards status of splenic infarct showed resolution of splenic infarct area. 2019.    Continue Eliquis BID.      She is seen nurse practitioner Gabriel for general care and for thyroid management.     Her son had COVID, but was asymptomatic.  She did not have symptoms and isolated herself.    She has had 2 of 2 vaccines and the booster shot and the flu shot.  She received the pneumonia vaccination 2 years ago.  She has not taken the shingles vaccine.    She has COPD and is on oxygen 2 liters/minute 24/7.     No acute distress, does not appear chronically ill.  She does not have palpable lymphadenopathy.  Her lungs are clear bilaterally.  She has regular rhythm without murmur.  Abdomen is flat without distention, abdomen is nontender without palpable hepatomegaly or splenomegaly or abdominal mass.  Calves are nontender without edema, no petechiae or purpura.  Neurologically grossly intact    History of splenic infarct with  Increased factor 8 at 250 and increased anticardiolipin antibody at 13.  Now.    Continue Eliquis 5 mg b.i.d..  Return to clinic in 6 months.  Recheck factor 8 and anticardiolipin antibody in July 2022.

## 2022-03-16 ENCOUNTER — TELEPHONE (OUTPATIENT)
Dept: HEMATOLOGY/ONCOLOGY | Facility: CLINIC | Age: 67
End: 2022-03-16

## 2022-03-16 ENCOUNTER — LAB VISIT (OUTPATIENT)
Dept: LAB | Facility: HOSPITAL | Age: 67
End: 2022-03-16
Attending: INTERNAL MEDICINE
Payer: MEDICARE

## 2022-03-16 ENCOUNTER — OFFICE VISIT (OUTPATIENT)
Dept: HEMATOLOGY/ONCOLOGY | Facility: CLINIC | Age: 67
End: 2022-03-16
Payer: MEDICARE

## 2022-03-16 VITALS
SYSTOLIC BLOOD PRESSURE: 72 MMHG | HEART RATE: 90 BPM | HEIGHT: 63 IN | BODY MASS INDEX: 39.34 KG/M2 | WEIGHT: 222 LBS | TEMPERATURE: 98 F | DIASTOLIC BLOOD PRESSURE: 47 MMHG | RESPIRATION RATE: 22 BRPM

## 2022-03-16 DIAGNOSIS — N18.2 ANEMIA ASSOCIATED WITH STAGE 2 CHRONIC RENAL FAILURE: ICD-10-CM

## 2022-03-16 DIAGNOSIS — D50.0 IRON DEFICIENCY ANEMIA DUE TO CHRONIC BLOOD LOSS: ICD-10-CM

## 2022-03-16 DIAGNOSIS — R32 INCONTINENCE IN FEMALE: ICD-10-CM

## 2022-03-16 DIAGNOSIS — D53.9 NON MEGALOBLASTIC ANEMIA ASSOCIATED WITH NUTRITIONAL DEFICIENCY: ICD-10-CM

## 2022-03-16 DIAGNOSIS — E03.9 ACQUIRED HYPOTHYROIDISM: ICD-10-CM

## 2022-03-16 DIAGNOSIS — R32 INCONTINENCE IN FEMALE: Primary | ICD-10-CM

## 2022-03-16 DIAGNOSIS — D63.1 ANEMIA ASSOCIATED WITH STAGE 2 CHRONIC RENAL FAILURE: ICD-10-CM

## 2022-03-16 DIAGNOSIS — R74.8 ABNORMAL LEVELS OF OTHER SERUM ENZYMES: ICD-10-CM

## 2022-03-16 DIAGNOSIS — D50.0 IRON DEFICIENCY ANEMIA DUE TO CHRONIC BLOOD LOSS: Primary | ICD-10-CM

## 2022-03-16 LAB
ABO + RH BLD: NORMAL
ALBUMIN SERPL BCP-MCNC: 3.9 G/DL (ref 3.5–5.2)
ALP SERPL-CCNC: 53 U/L (ref 55–135)
ALT SERPL W/O P-5'-P-CCNC: 12 U/L (ref 10–44)
ANION GAP SERPL CALC-SCNC: 4 MMOL/L (ref 8–16)
AST SERPL-CCNC: 22 U/L (ref 10–40)
BASOPHILS # BLD AUTO: 0.13 K/UL (ref 0–0.2)
BASOPHILS NFR BLD: 1 % (ref 0–1.9)
BILIRUB SERPL-MCNC: 0.4 MG/DL (ref 0.1–1)
BLD GP AB SCN CELLS X3 SERPL QL: NORMAL
BUN SERPL-MCNC: 6 MG/DL (ref 8–23)
CALCIUM SERPL-MCNC: 8.8 MG/DL (ref 8.7–10.5)
CHLORIDE SERPL-SCNC: 103 MMOL/L (ref 95–110)
CO2 SERPL-SCNC: 24 MMOL/L (ref 23–29)
CREAT SERPL-MCNC: 0.8 MG/DL (ref 0.5–1.4)
DIFFERENTIAL METHOD: ABNORMAL
EOSINOPHIL # BLD AUTO: 0.2 K/UL (ref 0–0.5)
EOSINOPHIL NFR BLD: 1.6 % (ref 0–8)
ERYTHROCYTE [DISTWIDTH] IN BLOOD BY AUTOMATED COUNT: 18.6 % (ref 11.5–14.5)
EST. GFR  (AFRICAN AMERICAN): >60 ML/MIN/1.73 M^2
EST. GFR  (NON AFRICAN AMERICAN): >60 ML/MIN/1.73 M^2
FERRITIN SERPL-MCNC: 5 NG/ML (ref 20–300)
FOLATE SERPL-MCNC: 6.2 NG/ML (ref 4–24)
GLUCOSE SERPL-MCNC: 100 MG/DL (ref 70–110)
HCT VFR BLD AUTO: 26.1 % (ref 37–48.5)
HGB BLD-MCNC: 6.9 G/DL (ref 12–16)
IMM GRANULOCYTES # BLD AUTO: 0.05 K/UL (ref 0–0.04)
IMM GRANULOCYTES NFR BLD AUTO: 0.4 % (ref 0–0.5)
LYMPHOCYTES # BLD AUTO: 2.5 K/UL (ref 1–4.8)
LYMPHOCYTES NFR BLD: 19.3 % (ref 18–48)
MCH RBC QN AUTO: 17.1 PG (ref 27–31)
MCHC RBC AUTO-ENTMCNC: 26.4 G/DL (ref 32–36)
MCV RBC AUTO: 65 FL (ref 82–98)
MONOCYTES # BLD AUTO: 1.1 K/UL (ref 0.3–1)
MONOCYTES NFR BLD: 8.5 % (ref 4–15)
NEUTROPHILS # BLD AUTO: 8.9 K/UL (ref 1.8–7.7)
NEUTROPHILS NFR BLD: 69.2 % (ref 38–73)
NRBC BLD-RTO: 0 /100 WBC
PLATELET # BLD AUTO: 459 K/UL (ref 150–450)
PMV BLD AUTO: 8.8 FL (ref 9.2–12.9)
POTASSIUM SERPL-SCNC: 3.6 MMOL/L (ref 3.5–5.1)
PROT SERPL-MCNC: 7.8 G/DL (ref 6–8.4)
RBC # BLD AUTO: 4.03 M/UL (ref 4–5.4)
RETICS/RBC NFR AUTO: 1.6 % (ref 0.5–2.5)
SCHISTOCYTES BLD QL SMEAR: ABNORMAL
SODIUM SERPL-SCNC: 131 MMOL/L (ref 136–145)
TSH SERPL DL<=0.005 MIU/L-ACNC: 1.57 UIU/ML (ref 0.34–5.6)
VIT B12 SERPL-MCNC: 96 PG/ML (ref 210–950)
WBC # BLD AUTO: 12.8 K/UL (ref 3.9–12.7)

## 2022-03-16 PROCEDURE — 82728 ASSAY OF FERRITIN: CPT | Performed by: INTERNAL MEDICINE

## 2022-03-16 PROCEDURE — 99214 PR OFFICE/OUTPT VISIT, EST, LEVL IV, 30-39 MIN: ICD-10-PCS | Mod: S$GLB,,, | Performed by: INTERNAL MEDICINE

## 2022-03-16 PROCEDURE — 84443 ASSAY THYROID STIM HORMONE: CPT | Performed by: INTERNAL MEDICINE

## 2022-03-16 PROCEDURE — 84207 ASSAY OF VITAMIN B-6: CPT | Performed by: INTERNAL MEDICINE

## 2022-03-16 PROCEDURE — 80053 COMPREHEN METABOLIC PANEL: CPT | Performed by: INTERNAL MEDICINE

## 2022-03-16 PROCEDURE — 82746 ASSAY OF FOLIC ACID SERUM: CPT | Performed by: INTERNAL MEDICINE

## 2022-03-16 PROCEDURE — 86901 BLOOD TYPING SEROLOGIC RH(D): CPT | Performed by: INTERNAL MEDICINE

## 2022-03-16 PROCEDURE — 36415 COLL VENOUS BLD VENIPUNCTURE: CPT | Performed by: INTERNAL MEDICINE

## 2022-03-16 PROCEDURE — 82607 VITAMIN B-12: CPT | Performed by: INTERNAL MEDICINE

## 2022-03-16 PROCEDURE — 85045 AUTOMATED RETICULOCYTE COUNT: CPT | Performed by: INTERNAL MEDICINE

## 2022-03-16 PROCEDURE — 85025 COMPLETE CBC W/AUTO DIFF WBC: CPT | Performed by: INTERNAL MEDICINE

## 2022-03-16 PROCEDURE — 82668 ASSAY OF ERYTHROPOIETIN: CPT | Performed by: INTERNAL MEDICINE

## 2022-03-16 PROCEDURE — 99214 OFFICE O/P EST MOD 30 MIN: CPT | Mod: S$GLB,,, | Performed by: INTERNAL MEDICINE

## 2022-03-16 RX ORDER — HEPARIN 100 UNIT/ML
500 SYRINGE INTRAVENOUS
Status: CANCELLED | OUTPATIENT
Start: 2022-03-18

## 2022-03-16 RX ORDER — DIPHENHYDRAMINE HYDROCHLORIDE 50 MG/ML
50 INJECTION INTRAMUSCULAR; INTRAVENOUS ONCE AS NEEDED
Status: CANCELLED | OUTPATIENT
Start: 2022-03-18

## 2022-03-16 RX ORDER — SODIUM CHLORIDE 0.9 % (FLUSH) 0.9 %
10 SYRINGE (ML) INJECTION
Status: CANCELLED | OUTPATIENT
Start: 2022-03-18

## 2022-03-16 RX ORDER — EPINEPHRINE 0.3 MG/.3ML
0.3 INJECTION SUBCUTANEOUS ONCE AS NEEDED
Status: CANCELLED | OUTPATIENT
Start: 2022-03-18

## 2022-03-16 RX ORDER — METHYLPREDNISOLONE SOD SUCC 125 MG
125 VIAL (EA) INJECTION ONCE AS NEEDED
Status: CANCELLED | OUTPATIENT
Start: 2022-03-18

## 2022-03-16 NOTE — TELEPHONE ENCOUNTER
Orders placed for Ferrlicet weekly x's 8 weeks.  Fe deficiency anemia    Start 3/18/22    Get date and time    Get auth

## 2022-03-16 NOTE — PROGRESS NOTES
[facility] hematology Oncology in office subsequent encounter note  3/16/22  BRENDA Rios, Hazel Tavera        Presented with LUQ pain.  Had splenic artery thrombus with splenic infarct.  On Eliquis 5mg BID.  No LUQ pain sx now.    Coagulation evaluation increased Factor 8 and increased Anti cardiolipid IgM antibody levels.  Persistently increased on repeat determination.    Estimate 30% risk of clot event over next 2-3 years.    Continue Eliquis 5mg BID therapeutic and prophylaxis.    She admits to blurred vision and dizzyness intermittently, sx predated the splenic infarct and have not changed while on Eliquis.    History of COPD.  On 02 24/7.    Re check her CAT of abdomen  regards status of splenic infarct showed resolution of splenic infarct area. 2019.    Continue Eliquis BID.      She is seen nurse practitioner Gabriel for general care and for thyroid management.     She saw her cardiologist, Dr. Oliva, due for chemical stress test.    Her son had COVID, but was asymptomatic.  She did not have symptoms and isolated herself.    She has had 2 of 2 vaccines and the booster shot and the flu shot.  She received the pneumonia vaccination 2 years ago.  She has not taken the shingles vaccine.    She has COPD and is on oxygen 2 liters/minute 24/7.     No acute distress, does not appear chronically ill.  She does not have palpable lymphadenopathy.  Her lungs are clear bilaterally.  She has regular rhythm without murmur.  Abdomen is flat without distention, abdomen is nontender without palpable hepatomegaly or splenomegaly or abdominal mass.  Calves are nontender without edema, no petechiae or purpura.  Neurologically grossly intact    History of splenic infarct with  Increased factor 8 at 250 and increased anticardiolipin antibody at 13.  Now.    Continue Eliquis 5 mg b.i.d..  Return to clinic in 6 months.  Recheck factor 8 and anticardiolipin antibody in July 2022.     H/H 6/20  Ferritin 5  B 12 69    Begin  Ferrlicet x's8 weeks  B 12 weekly x's 4 then monthly.  Stool for occult blood.    [facility] 6/2022.

## 2022-03-17 ENCOUNTER — DOCUMENTATION ONLY (OUTPATIENT)
Dept: INFUSION THERAPY | Facility: HOSPITAL | Age: 67
End: 2022-03-17
Payer: MEDICARE

## 2022-03-17 NOTE — PROGRESS NOTES
Spoke to pt on phone, pt agreed to come in at 8am on 3/18/2022 for iron infusion. Schedule time adjusted.

## 2022-03-18 ENCOUNTER — INFUSION (OUTPATIENT)
Dept: INFUSION THERAPY | Facility: HOSPITAL | Age: 67
End: 2022-03-18
Attending: INTERNAL MEDICINE
Payer: MEDICARE

## 2022-03-18 VITALS
HEIGHT: 63 IN | BODY MASS INDEX: 39.81 KG/M2 | WEIGHT: 224.69 LBS | TEMPERATURE: 98 F | HEART RATE: 78 BPM | RESPIRATION RATE: 16 BRPM | SYSTOLIC BLOOD PRESSURE: 92 MMHG | DIASTOLIC BLOOD PRESSURE: 59 MMHG

## 2022-03-18 DIAGNOSIS — D50.0 IRON DEFICIENCY ANEMIA DUE TO CHRONIC BLOOD LOSS: Primary | ICD-10-CM

## 2022-03-18 PROCEDURE — 63600175 PHARM REV CODE 636 W HCPCS: Performed by: INTERNAL MEDICINE

## 2022-03-18 PROCEDURE — 96365 THER/PROPH/DIAG IV INF INIT: CPT

## 2022-03-18 PROCEDURE — 25000003 PHARM REV CODE 250: Performed by: INTERNAL MEDICINE

## 2022-03-18 RX ORDER — HEPARIN 100 UNIT/ML
500 SYRINGE INTRAVENOUS
Status: CANCELLED | OUTPATIENT
Start: 2022-03-25

## 2022-03-18 RX ORDER — EPINEPHRINE 0.3 MG/.3ML
0.3 INJECTION SUBCUTANEOUS ONCE AS NEEDED
Status: CANCELLED | OUTPATIENT
Start: 2022-03-25

## 2022-03-18 RX ORDER — DIPHENHYDRAMINE HYDROCHLORIDE 50 MG/ML
50 INJECTION INTRAMUSCULAR; INTRAVENOUS ONCE AS NEEDED
Status: CANCELLED | OUTPATIENT
Start: 2022-03-25

## 2022-03-18 RX ORDER — SODIUM CHLORIDE 0.9 % (FLUSH) 0.9 %
10 SYRINGE (ML) INJECTION
Status: CANCELLED | OUTPATIENT
Start: 2022-03-25

## 2022-03-18 RX ORDER — METHYLPREDNISOLONE SOD SUCC 125 MG
125 VIAL (EA) INJECTION ONCE AS NEEDED
Status: CANCELLED | OUTPATIENT
Start: 2022-03-25

## 2022-03-18 RX ADMIN — SODIUM CHLORIDE 125 MG: 9 INJECTION, SOLUTION INTRAVENOUS at 08:03

## 2022-03-18 NOTE — PLAN OF CARE
Problem: Activity Intolerance  Goal: Enhanced Capacity and Energy  Outcome: Ongoing, Progressing  Intervention: Optimize Activity Tolerance  Flowsheets (Taken 3/18/2022 2859)  Self-Care Promotion: independence encouraged  Activity Management:   Ambulated -L4   Ambulated in nevarez - L4   Ambulated in room - L4   Up in chair - L3  Environmental Support:   calm environment promoted   distractions minimized   rest periods encouraged

## 2022-03-20 LAB — EPO SERPL-ACNC: 482.2 MIU/ML (ref 2.6–18.5)

## 2022-03-24 ENCOUNTER — TELEPHONE (OUTPATIENT)
Dept: HEMATOLOGY/ONCOLOGY | Facility: CLINIC | Age: 67
End: 2022-03-24
Payer: MEDICARE

## 2022-03-24 RX ORDER — METHYLPREDNISOLONE SOD SUCC 125 MG
125 VIAL (EA) INJECTION ONCE AS NEEDED
Status: CANCELLED | OUTPATIENT
Start: 2022-03-31

## 2022-03-24 RX ORDER — SODIUM CHLORIDE 0.9 % (FLUSH) 0.9 %
10 SYRINGE (ML) INJECTION
Status: CANCELLED | OUTPATIENT
Start: 2022-03-31

## 2022-03-24 RX ORDER — EPINEPHRINE 0.3 MG/.3ML
0.3 INJECTION SUBCUTANEOUS ONCE AS NEEDED
Status: CANCELLED | OUTPATIENT
Start: 2022-03-31

## 2022-03-24 RX ORDER — DIPHENHYDRAMINE HYDROCHLORIDE 50 MG/ML
50 INJECTION INTRAMUSCULAR; INTRAVENOUS ONCE AS NEEDED
Status: CANCELLED | OUTPATIENT
Start: 2022-03-31

## 2022-03-24 RX ORDER — HEPARIN 100 UNIT/ML
500 SYRINGE INTRAVENOUS
Status: CANCELLED | OUTPATIENT
Start: 2022-03-31

## 2022-03-25 ENCOUNTER — INFUSION (OUTPATIENT)
Dept: INFUSION THERAPY | Facility: HOSPITAL | Age: 67
End: 2022-03-25
Attending: INTERNAL MEDICINE
Payer: MEDICARE

## 2022-03-25 VITALS
DIASTOLIC BLOOD PRESSURE: 51 MMHG | HEART RATE: 81 BPM | SYSTOLIC BLOOD PRESSURE: 108 MMHG | RESPIRATION RATE: 18 BRPM | TEMPERATURE: 97 F | BODY MASS INDEX: 39.47 KG/M2 | OXYGEN SATURATION: 100 % | WEIGHT: 222.81 LBS

## 2022-03-25 DIAGNOSIS — D50.0 IRON DEFICIENCY ANEMIA DUE TO CHRONIC BLOOD LOSS: Primary | ICD-10-CM

## 2022-03-25 LAB — VIT B6 SERPL-MCNC: 8.1 UG/L (ref 2–32.8)

## 2022-03-25 PROCEDURE — 96365 THER/PROPH/DIAG IV INF INIT: CPT

## 2022-03-25 PROCEDURE — A4216 STERILE WATER/SALINE, 10 ML: HCPCS | Performed by: INTERNAL MEDICINE

## 2022-03-25 PROCEDURE — 25000003 PHARM REV CODE 250: Performed by: INTERNAL MEDICINE

## 2022-03-25 PROCEDURE — 63600175 PHARM REV CODE 636 W HCPCS: Performed by: INTERNAL MEDICINE

## 2022-03-25 RX ORDER — HEPARIN 100 UNIT/ML
500 SYRINGE INTRAVENOUS
Status: CANCELLED | OUTPATIENT
Start: 2022-04-01

## 2022-03-25 RX ORDER — METHYLPREDNISOLONE SOD SUCC 125 MG
125 VIAL (EA) INJECTION ONCE AS NEEDED
Status: CANCELLED | OUTPATIENT
Start: 2022-04-01

## 2022-03-25 RX ORDER — SODIUM CHLORIDE 0.9 % (FLUSH) 0.9 %
10 SYRINGE (ML) INJECTION
Status: CANCELLED | OUTPATIENT
Start: 2022-04-01

## 2022-03-25 RX ORDER — SODIUM CHLORIDE 0.9 % (FLUSH) 0.9 %
10 SYRINGE (ML) INJECTION
Status: DISCONTINUED | OUTPATIENT
Start: 2022-03-25 | End: 2022-03-25 | Stop reason: HOSPADM

## 2022-03-25 RX ORDER — DIPHENHYDRAMINE HYDROCHLORIDE 50 MG/ML
50 INJECTION INTRAMUSCULAR; INTRAVENOUS ONCE AS NEEDED
Status: CANCELLED | OUTPATIENT
Start: 2022-04-01

## 2022-03-25 RX ORDER — EPINEPHRINE 0.3 MG/.3ML
0.3 INJECTION SUBCUTANEOUS ONCE AS NEEDED
Status: CANCELLED | OUTPATIENT
Start: 2022-04-01

## 2022-03-25 RX ADMIN — SODIUM CHLORIDE, PRESERVATIVE FREE 10 ML: 5 INJECTION INTRAVENOUS at 12:03

## 2022-03-25 RX ADMIN — SODIUM CHLORIDE: 9 INJECTION, SOLUTION INTRAVENOUS at 10:03

## 2022-03-25 RX ADMIN — SODIUM CHLORIDE 125 MG: 9 INJECTION, SOLUTION INTRAVENOUS at 10:03

## 2022-03-25 NOTE — PLAN OF CARE
Problem: Fall Injury Risk  Goal: Absence of Fall and Fall-Related Injury  Outcome: Ongoing, Progressing  Intervention: Identify and Manage Contributors  Flowsheets (Taken 3/25/2022 0949)  Self-Care Promotion: independence encouraged  Medication Review/Management: medications reviewed  Intervention: Promote Injury-Free Environment  Flowsheets (Taken 3/25/2022 0949)  Safety Promotion/Fall Prevention: medications reviewed

## 2022-04-01 ENCOUNTER — INFUSION (OUTPATIENT)
Dept: INFUSION THERAPY | Facility: HOSPITAL | Age: 67
End: 2022-04-01
Attending: INTERNAL MEDICINE
Payer: MEDICARE

## 2022-04-01 VITALS
DIASTOLIC BLOOD PRESSURE: 85 MMHG | OXYGEN SATURATION: 98 % | HEIGHT: 63 IN | WEIGHT: 220 LBS | RESPIRATION RATE: 16 BRPM | BODY MASS INDEX: 38.98 KG/M2 | SYSTOLIC BLOOD PRESSURE: 143 MMHG | HEART RATE: 85 BPM | TEMPERATURE: 98 F

## 2022-04-01 DIAGNOSIS — D50.0 IRON DEFICIENCY ANEMIA DUE TO CHRONIC BLOOD LOSS: Primary | ICD-10-CM

## 2022-04-01 PROBLEM — E53.8 B12 DEFICIENCY: Status: ACTIVE | Noted: 2022-04-01

## 2022-04-01 PROCEDURE — 96365 THER/PROPH/DIAG IV INF INIT: CPT

## 2022-04-01 PROCEDURE — 63600175 PHARM REV CODE 636 W HCPCS: Performed by: INTERNAL MEDICINE

## 2022-04-01 PROCEDURE — 25000003 PHARM REV CODE 250: Performed by: INTERNAL MEDICINE

## 2022-04-01 PROCEDURE — 96372 THER/PROPH/DIAG INJ SC/IM: CPT

## 2022-04-01 RX ORDER — HEPARIN 100 UNIT/ML
500 SYRINGE INTRAVENOUS
Status: CANCELLED | OUTPATIENT
Start: 2022-04-08

## 2022-04-01 RX ORDER — CYANOCOBALAMIN 1000 UG/ML
1000 INJECTION, SOLUTION INTRAMUSCULAR; SUBCUTANEOUS ONCE
Status: CANCELLED
Start: 2022-04-01

## 2022-04-01 RX ORDER — SODIUM CHLORIDE 0.9 % (FLUSH) 0.9 %
10 SYRINGE (ML) INJECTION
Status: DISCONTINUED | OUTPATIENT
Start: 2022-04-01 | End: 2022-04-01 | Stop reason: HOSPADM

## 2022-04-01 RX ORDER — SODIUM CHLORIDE 0.9 % (FLUSH) 0.9 %
10 SYRINGE (ML) INJECTION
Status: CANCELLED | OUTPATIENT
Start: 2022-04-08

## 2022-04-01 RX ORDER — CYANOCOBALAMIN 1000 UG/ML
INJECTION, SOLUTION INTRAMUSCULAR; SUBCUTANEOUS
Status: DISCONTINUED
Start: 2022-04-01 | End: 2022-04-01 | Stop reason: HOSPADM

## 2022-04-01 RX ORDER — EPINEPHRINE 0.3 MG/.3ML
0.3 INJECTION SUBCUTANEOUS ONCE AS NEEDED
Status: CANCELLED | OUTPATIENT
Start: 2022-04-08

## 2022-04-01 RX ORDER — CYANOCOBALAMIN 1000 UG/ML
1000 INJECTION, SOLUTION INTRAMUSCULAR; SUBCUTANEOUS ONCE
Status: CANCELLED
Start: 2022-04-08

## 2022-04-01 RX ORDER — CYANOCOBALAMIN 1000 UG/ML
1000 INJECTION, SOLUTION INTRAMUSCULAR; SUBCUTANEOUS
Status: CANCELLED | OUTPATIENT
Start: 2022-05-20

## 2022-04-01 RX ORDER — DIPHENHYDRAMINE HYDROCHLORIDE 50 MG/ML
50 INJECTION INTRAMUSCULAR; INTRAVENOUS ONCE AS NEEDED
Status: CANCELLED | OUTPATIENT
Start: 2022-04-08

## 2022-04-01 RX ORDER — METHYLPREDNISOLONE SOD SUCC 125 MG
125 VIAL (EA) INJECTION ONCE AS NEEDED
Status: CANCELLED | OUTPATIENT
Start: 2022-04-08

## 2022-04-01 RX ORDER — CYANOCOBALAMIN 1000 UG/ML
1000 INJECTION, SOLUTION INTRAMUSCULAR; SUBCUTANEOUS ONCE
Status: COMPLETED | OUTPATIENT
Start: 2022-04-01 | End: 2022-04-01

## 2022-04-01 RX ADMIN — CYANOCOBALAMIN 1000 MCG: 1000 INJECTION, SOLUTION INTRAMUSCULAR at 12:04

## 2022-04-01 RX ADMIN — SODIUM CHLORIDE: 0.9 INJECTION, SOLUTION INTRAVENOUS at 10:04

## 2022-04-01 RX ADMIN — SODIUM CHLORIDE 125 MG: 9 INJECTION, SOLUTION INTRAVENOUS at 10:04

## 2022-04-01 NOTE — PLAN OF CARE
Problem: Fatigue  Goal: Improved Activity Tolerance  Outcome: Ongoing, Progressing  Intervention: Promote Improved Energy  Flowsheets (Taken 4/1/2022 1097)  Fatigue Management: frequent rest breaks encouraged  Sleep/Rest Enhancement:   regular sleep/rest pattern promoted   relaxation techniques promoted  Activity Management: Ambulated -L4

## 2022-04-08 ENCOUNTER — INFUSION (OUTPATIENT)
Dept: INFUSION THERAPY | Facility: HOSPITAL | Age: 67
End: 2022-04-08
Attending: INTERNAL MEDICINE
Payer: MEDICARE

## 2022-04-08 VITALS
HEART RATE: 75 BPM | HEIGHT: 63 IN | TEMPERATURE: 98 F | SYSTOLIC BLOOD PRESSURE: 132 MMHG | WEIGHT: 220.69 LBS | RESPIRATION RATE: 18 BRPM | BODY MASS INDEX: 39.1 KG/M2 | OXYGEN SATURATION: 99 % | DIASTOLIC BLOOD PRESSURE: 65 MMHG

## 2022-04-08 DIAGNOSIS — E53.8 B12 DEFICIENCY: Primary | ICD-10-CM

## 2022-04-08 DIAGNOSIS — D50.0 IRON DEFICIENCY ANEMIA DUE TO CHRONIC BLOOD LOSS: ICD-10-CM

## 2022-04-08 PROCEDURE — 63600175 PHARM REV CODE 636 W HCPCS: Performed by: INTERNAL MEDICINE

## 2022-04-08 PROCEDURE — 96372 THER/PROPH/DIAG INJ SC/IM: CPT | Mod: 59

## 2022-04-08 PROCEDURE — 25000003 PHARM REV CODE 250: Performed by: INTERNAL MEDICINE

## 2022-04-08 PROCEDURE — 96365 THER/PROPH/DIAG IV INF INIT: CPT

## 2022-04-08 RX ORDER — SODIUM CHLORIDE 0.9 % (FLUSH) 0.9 %
10 SYRINGE (ML) INJECTION
Status: DISCONTINUED | OUTPATIENT
Start: 2022-04-08 | End: 2022-04-08 | Stop reason: HOSPADM

## 2022-04-08 RX ORDER — SODIUM CHLORIDE 0.9 % (FLUSH) 0.9 %
10 SYRINGE (ML) INJECTION
Status: CANCELLED | OUTPATIENT
Start: 2022-04-15

## 2022-04-08 RX ORDER — EPINEPHRINE 0.3 MG/.3ML
0.3 INJECTION SUBCUTANEOUS ONCE AS NEEDED
Status: CANCELLED | OUTPATIENT
Start: 2022-04-15

## 2022-04-08 RX ORDER — CYANOCOBALAMIN 1000 UG/ML
1000 INJECTION, SOLUTION INTRAMUSCULAR; SUBCUTANEOUS ONCE
Status: COMPLETED | OUTPATIENT
Start: 2022-04-08 | End: 2022-04-08

## 2022-04-08 RX ORDER — DIPHENHYDRAMINE HYDROCHLORIDE 50 MG/ML
50 INJECTION INTRAMUSCULAR; INTRAVENOUS ONCE AS NEEDED
Status: CANCELLED | OUTPATIENT
Start: 2022-04-15

## 2022-04-08 RX ORDER — CYANOCOBALAMIN 1000 UG/ML
1000 INJECTION, SOLUTION INTRAMUSCULAR; SUBCUTANEOUS ONCE
Status: CANCELLED
Start: 2022-04-15

## 2022-04-08 RX ORDER — HEPARIN 100 UNIT/ML
500 SYRINGE INTRAVENOUS
Status: CANCELLED | OUTPATIENT
Start: 2022-04-15

## 2022-04-08 RX ORDER — METHYLPREDNISOLONE SOD SUCC 125 MG
125 VIAL (EA) INJECTION ONCE AS NEEDED
Status: CANCELLED | OUTPATIENT
Start: 2022-04-15

## 2022-04-08 RX ADMIN — SODIUM CHLORIDE 125 MG: 9 INJECTION, SOLUTION INTRAVENOUS at 11:04

## 2022-04-08 RX ADMIN — CYANOCOBALAMIN 1000 MCG: 1000 INJECTION, SOLUTION INTRAMUSCULAR at 11:04

## 2022-04-08 NOTE — PLAN OF CARE
Problem: Fatigue  Goal: Improved Activity Tolerance  Intervention: Promote Improved Energy  Flowsheets (Taken 4/8/2022 1053)  Fatigue Management: fatigue-related activity identified  Activity Management: Ambulated -L4

## 2022-04-14 ENCOUNTER — INFUSION (OUTPATIENT)
Dept: INFUSION THERAPY | Facility: HOSPITAL | Age: 67
End: 2022-04-14
Attending: INTERNAL MEDICINE
Payer: MEDICARE

## 2022-04-14 VITALS
HEART RATE: 86 BPM | TEMPERATURE: 98 F | DIASTOLIC BLOOD PRESSURE: 61 MMHG | BODY MASS INDEX: 39.05 KG/M2 | WEIGHT: 220.38 LBS | HEIGHT: 63 IN | RESPIRATION RATE: 22 BRPM | SYSTOLIC BLOOD PRESSURE: 109 MMHG

## 2022-04-14 DIAGNOSIS — D50.0 IRON DEFICIENCY ANEMIA DUE TO CHRONIC BLOOD LOSS: ICD-10-CM

## 2022-04-14 DIAGNOSIS — E53.8 B12 DEFICIENCY: Primary | ICD-10-CM

## 2022-04-14 PROCEDURE — 63600175 PHARM REV CODE 636 W HCPCS: Performed by: INTERNAL MEDICINE

## 2022-04-14 PROCEDURE — 25000003 PHARM REV CODE 250: Performed by: INTERNAL MEDICINE

## 2022-04-14 PROCEDURE — 96372 THER/PROPH/DIAG INJ SC/IM: CPT | Mod: 59

## 2022-04-14 PROCEDURE — 96365 THER/PROPH/DIAG IV INF INIT: CPT

## 2022-04-14 RX ORDER — CYANOCOBALAMIN 1000 UG/ML
1000 INJECTION, SOLUTION INTRAMUSCULAR; SUBCUTANEOUS ONCE
Status: CANCELLED
Start: 2022-04-15

## 2022-04-14 RX ORDER — DIPHENHYDRAMINE HYDROCHLORIDE 50 MG/ML
50 INJECTION INTRAMUSCULAR; INTRAVENOUS ONCE AS NEEDED
Status: CANCELLED | OUTPATIENT
Start: 2022-04-15

## 2022-04-14 RX ORDER — CYANOCOBALAMIN 1000 UG/ML
1000 INJECTION, SOLUTION INTRAMUSCULAR; SUBCUTANEOUS ONCE
Status: COMPLETED | OUTPATIENT
Start: 2022-04-14 | End: 2022-04-14

## 2022-04-14 RX ORDER — METHYLPREDNISOLONE SOD SUCC 125 MG
125 VIAL (EA) INJECTION ONCE AS NEEDED
Status: CANCELLED | OUTPATIENT
Start: 2022-04-15

## 2022-04-14 RX ORDER — SODIUM CHLORIDE 0.9 % (FLUSH) 0.9 %
10 SYRINGE (ML) INJECTION
Status: CANCELLED | OUTPATIENT
Start: 2022-04-15

## 2022-04-14 RX ORDER — EPINEPHRINE 0.3 MG/.3ML
0.3 INJECTION SUBCUTANEOUS ONCE AS NEEDED
Status: CANCELLED | OUTPATIENT
Start: 2022-04-15

## 2022-04-14 RX ORDER — HEPARIN 100 UNIT/ML
500 SYRINGE INTRAVENOUS
Status: CANCELLED | OUTPATIENT
Start: 2022-04-15

## 2022-04-14 RX ADMIN — SODIUM CHLORIDE 125 MG: 9 INJECTION, SOLUTION INTRAVENOUS at 11:04

## 2022-04-14 RX ADMIN — CYANOCOBALAMIN 1000 MCG: 1000 INJECTION, SOLUTION INTRAMUSCULAR at 01:04

## 2022-04-14 NOTE — PLAN OF CARE
Problem: Activity Intolerance  Goal: Enhanced Capacity and Energy  Outcome: Ongoing, Progressing  Intervention: Optimize Activity Tolerance  Flowsheets (Taken 4/14/2022 1042)  Self-Care Promotion: independence encouraged  Activity Management:   Ambulated -L4   Ambulated in room - L4   Ambulated in nevarez - L4   Up in chair - L3  Environmental Support:   calm environment promoted   distractions minimized   rest periods encouraged

## 2022-04-22 ENCOUNTER — INFUSION (OUTPATIENT)
Dept: INFUSION THERAPY | Facility: HOSPITAL | Age: 67
End: 2022-04-22
Attending: INTERNAL MEDICINE
Payer: MEDICARE

## 2022-04-22 VITALS
DIASTOLIC BLOOD PRESSURE: 66 MMHG | OXYGEN SATURATION: 99 % | RESPIRATION RATE: 18 BRPM | BODY MASS INDEX: 42.7 KG/M2 | SYSTOLIC BLOOD PRESSURE: 118 MMHG | HEIGHT: 60 IN | HEART RATE: 71 BPM | TEMPERATURE: 98 F | WEIGHT: 217.5 LBS

## 2022-04-22 DIAGNOSIS — E53.8 B12 DEFICIENCY: Primary | ICD-10-CM

## 2022-04-22 DIAGNOSIS — D50.0 IRON DEFICIENCY ANEMIA DUE TO CHRONIC BLOOD LOSS: ICD-10-CM

## 2022-04-22 PROCEDURE — 25000003 PHARM REV CODE 250: Performed by: INTERNAL MEDICINE

## 2022-04-22 PROCEDURE — 63600175 PHARM REV CODE 636 W HCPCS: Performed by: INTERNAL MEDICINE

## 2022-04-22 PROCEDURE — 96372 THER/PROPH/DIAG INJ SC/IM: CPT | Mod: 59

## 2022-04-22 PROCEDURE — 96365 THER/PROPH/DIAG IV INF INIT: CPT

## 2022-04-22 RX ORDER — CYANOCOBALAMIN 1000 UG/ML
1000 INJECTION, SOLUTION INTRAMUSCULAR; SUBCUTANEOUS ONCE
Status: CANCELLED
Start: 2022-04-29

## 2022-04-22 RX ORDER — HEPARIN 100 UNIT/ML
500 SYRINGE INTRAVENOUS
Status: CANCELLED | OUTPATIENT
Start: 2022-04-29

## 2022-04-22 RX ORDER — CYANOCOBALAMIN 1000 UG/ML
1000 INJECTION, SOLUTION INTRAMUSCULAR; SUBCUTANEOUS ONCE
Status: COMPLETED | OUTPATIENT
Start: 2022-04-22 | End: 2022-04-22

## 2022-04-22 RX ORDER — SODIUM CHLORIDE 0.9 % (FLUSH) 0.9 %
10 SYRINGE (ML) INJECTION
Status: CANCELLED | OUTPATIENT
Start: 2022-04-29

## 2022-04-22 RX ORDER — SODIUM CHLORIDE 0.9 % (FLUSH) 0.9 %
10 SYRINGE (ML) INJECTION
Status: DISCONTINUED | OUTPATIENT
Start: 2022-04-22 | End: 2022-04-22 | Stop reason: HOSPADM

## 2022-04-22 RX ORDER — DIPHENHYDRAMINE HYDROCHLORIDE 50 MG/ML
50 INJECTION INTRAMUSCULAR; INTRAVENOUS ONCE AS NEEDED
Status: CANCELLED | OUTPATIENT
Start: 2022-04-29

## 2022-04-22 RX ORDER — METHYLPREDNISOLONE SOD SUCC 125 MG
125 VIAL (EA) INJECTION ONCE AS NEEDED
Status: CANCELLED | OUTPATIENT
Start: 2022-04-29

## 2022-04-22 RX ORDER — EPINEPHRINE 0.3 MG/.3ML
0.3 INJECTION SUBCUTANEOUS ONCE AS NEEDED
Status: CANCELLED | OUTPATIENT
Start: 2022-04-29

## 2022-04-22 RX ADMIN — SODIUM CHLORIDE: 0.9 INJECTION, SOLUTION INTRAVENOUS at 01:04

## 2022-04-22 RX ADMIN — CYANOCOBALAMIN 1000 MCG: 1000 INJECTION, SOLUTION INTRAMUSCULAR at 01:04

## 2022-04-22 RX ADMIN — SODIUM CHLORIDE 125 MG: 9 INJECTION, SOLUTION INTRAVENOUS at 01:04

## 2022-04-22 NOTE — PLAN OF CARE
Problem: Fatigue  Goal: Improved Activity Tolerance  Intervention: Promote Improved Energy  Flowsheets (Taken 4/22/2022 1446)  Fatigue Management: frequent rest breaks encouraged

## 2022-04-28 ENCOUNTER — TELEPHONE (OUTPATIENT)
Dept: INFUSION THERAPY | Facility: HOSPITAL | Age: 67
End: 2022-04-28

## 2022-04-29 ENCOUNTER — INFUSION (OUTPATIENT)
Dept: INFUSION THERAPY | Facility: HOSPITAL | Age: 67
End: 2022-04-29
Attending: INTERNAL MEDICINE
Payer: MEDICARE

## 2022-04-29 VITALS
WEIGHT: 219.38 LBS | HEIGHT: 60 IN | DIASTOLIC BLOOD PRESSURE: 54 MMHG | RESPIRATION RATE: 18 BRPM | TEMPERATURE: 98 F | OXYGEN SATURATION: 97 % | SYSTOLIC BLOOD PRESSURE: 129 MMHG | BODY MASS INDEX: 43.07 KG/M2 | HEART RATE: 75 BPM

## 2022-04-29 DIAGNOSIS — E53.8 B12 DEFICIENCY: Primary | ICD-10-CM

## 2022-04-29 DIAGNOSIS — D50.0 IRON DEFICIENCY ANEMIA DUE TO CHRONIC BLOOD LOSS: ICD-10-CM

## 2022-04-29 PROCEDURE — 96372 THER/PROPH/DIAG INJ SC/IM: CPT | Mod: 59

## 2022-04-29 PROCEDURE — 63600175 PHARM REV CODE 636 W HCPCS: Performed by: INTERNAL MEDICINE

## 2022-04-29 PROCEDURE — 25000003 PHARM REV CODE 250: Performed by: INTERNAL MEDICINE

## 2022-04-29 PROCEDURE — 96365 THER/PROPH/DIAG IV INF INIT: CPT

## 2022-04-29 RX ORDER — CYANOCOBALAMIN 1000 UG/ML
1000 INJECTION, SOLUTION INTRAMUSCULAR; SUBCUTANEOUS ONCE
Status: CANCELLED
Start: 2022-05-06

## 2022-04-29 RX ORDER — CYANOCOBALAMIN 1000 UG/ML
1000 INJECTION, SOLUTION INTRAMUSCULAR; SUBCUTANEOUS ONCE
Status: COMPLETED | OUTPATIENT
Start: 2022-04-29 | End: 2022-04-29

## 2022-04-29 RX ORDER — EPINEPHRINE 0.3 MG/.3ML
0.3 INJECTION SUBCUTANEOUS ONCE AS NEEDED
Status: CANCELLED | OUTPATIENT
Start: 2022-05-06

## 2022-04-29 RX ORDER — SODIUM CHLORIDE 0.9 % (FLUSH) 0.9 %
10 SYRINGE (ML) INJECTION
Status: CANCELLED | OUTPATIENT
Start: 2022-05-06

## 2022-04-29 RX ORDER — SODIUM CHLORIDE 0.9 % (FLUSH) 0.9 %
10 SYRINGE (ML) INJECTION
Status: DISCONTINUED | OUTPATIENT
Start: 2022-04-29 | End: 2022-04-29 | Stop reason: HOSPADM

## 2022-04-29 RX ORDER — METHYLPREDNISOLONE SOD SUCC 125 MG
125 VIAL (EA) INJECTION ONCE AS NEEDED
Status: CANCELLED | OUTPATIENT
Start: 2022-05-06

## 2022-04-29 RX ORDER — DIPHENHYDRAMINE HYDROCHLORIDE 50 MG/ML
50 INJECTION INTRAMUSCULAR; INTRAVENOUS ONCE AS NEEDED
Status: CANCELLED | OUTPATIENT
Start: 2022-05-06

## 2022-04-29 RX ORDER — HEPARIN 100 UNIT/ML
500 SYRINGE INTRAVENOUS
Status: CANCELLED | OUTPATIENT
Start: 2022-05-06

## 2022-04-29 RX ADMIN — CYANOCOBALAMIN 1000 MCG: 1000 INJECTION, SOLUTION INTRAMUSCULAR at 08:04

## 2022-04-29 RX ADMIN — SODIUM CHLORIDE 125 MG: 9 INJECTION, SOLUTION INTRAVENOUS at 08:04

## 2022-04-29 NOTE — NURSING
Infusion and one hour observatin period complete. Patient states she has a terrible headache. Patient states she does not get headaches often. Patient refuses to wait any longer for observation. Patient educated on side effects of ferrlecit and s/s of a reaction. Patient still declines to stay any longer for observation. Patient instructed to take Benadryl 25 mg PO and Tylenol PO when she gets home. Patient also instructed to seek emergency treatment if symptoms worsen. Patient verbalized understanding.  Patient neurologically intact. Patient ambulated out of facility. Steady gait noted. Patient driven home in private vehicle by family member.

## 2022-04-29 NOTE — PLAN OF CARE
Problem: Fatigue  Goal: Improved Activity Tolerance  Outcome: Ongoing, Progressing  Intervention: Promote Improved Energy  Flowsheets (Taken 4/29/2022 0819)  Fatigue Management:   frequent rest breaks encouraged   fatigue-related activity identified   paced activity encouraged  Sleep/Rest Enhancement:   regular sleep/rest pattern promoted   relaxation techniques promoted

## 2022-05-06 ENCOUNTER — INFUSION (OUTPATIENT)
Dept: INFUSION THERAPY | Facility: HOSPITAL | Age: 67
End: 2022-05-06
Attending: INTERNAL MEDICINE
Payer: MEDICARE

## 2022-05-06 VITALS
TEMPERATURE: 98 F | DIASTOLIC BLOOD PRESSURE: 72 MMHG | SYSTOLIC BLOOD PRESSURE: 111 MMHG | BODY MASS INDEX: 42.81 KG/M2 | RESPIRATION RATE: 18 BRPM | HEART RATE: 72 BPM | HEIGHT: 60 IN | WEIGHT: 218.06 LBS

## 2022-05-06 DIAGNOSIS — E53.8 B12 DEFICIENCY: Primary | ICD-10-CM

## 2022-05-06 DIAGNOSIS — D50.0 IRON DEFICIENCY ANEMIA DUE TO CHRONIC BLOOD LOSS: ICD-10-CM

## 2022-05-06 PROCEDURE — 25000003 PHARM REV CODE 250: Performed by: INTERNAL MEDICINE

## 2022-05-06 PROCEDURE — 63600175 PHARM REV CODE 636 W HCPCS: Performed by: INTERNAL MEDICINE

## 2022-05-06 PROCEDURE — 96372 THER/PROPH/DIAG INJ SC/IM: CPT

## 2022-05-06 PROCEDURE — 96365 THER/PROPH/DIAG IV INF INIT: CPT

## 2022-05-06 PROCEDURE — A4216 STERILE WATER/SALINE, 10 ML: HCPCS | Performed by: INTERNAL MEDICINE

## 2022-05-06 RX ORDER — SODIUM CHLORIDE 0.9 % (FLUSH) 0.9 %
10 SYRINGE (ML) INJECTION
Status: DISCONTINUED | OUTPATIENT
Start: 2022-05-06 | End: 2022-05-06 | Stop reason: HOSPADM

## 2022-05-06 RX ORDER — METHYLPREDNISOLONE SOD SUCC 125 MG
125 VIAL (EA) INJECTION ONCE AS NEEDED
Status: CANCELLED | OUTPATIENT
Start: 2022-05-13

## 2022-05-06 RX ORDER — SODIUM CHLORIDE 0.9 % (FLUSH) 0.9 %
10 SYRINGE (ML) INJECTION
Status: CANCELLED | OUTPATIENT
Start: 2022-05-13

## 2022-05-06 RX ORDER — DIPHENHYDRAMINE HYDROCHLORIDE 50 MG/ML
50 INJECTION INTRAMUSCULAR; INTRAVENOUS ONCE AS NEEDED
Status: CANCELLED | OUTPATIENT
Start: 2022-05-13

## 2022-05-06 RX ORDER — HEPARIN 100 UNIT/ML
500 SYRINGE INTRAVENOUS
Status: CANCELLED | OUTPATIENT
Start: 2022-05-13

## 2022-05-06 RX ORDER — EPINEPHRINE 0.3 MG/.3ML
0.3 INJECTION SUBCUTANEOUS ONCE AS NEEDED
Status: CANCELLED | OUTPATIENT
Start: 2022-05-13

## 2022-05-06 RX ORDER — CYANOCOBALAMIN 1000 UG/ML
1000 INJECTION, SOLUTION INTRAMUSCULAR; SUBCUTANEOUS ONCE
Status: COMPLETED | OUTPATIENT
Start: 2022-05-06 | End: 2022-05-06

## 2022-05-06 RX ORDER — CYANOCOBALAMIN 1000 UG/ML
1000 INJECTION, SOLUTION INTRAMUSCULAR; SUBCUTANEOUS ONCE
Status: CANCELLED
Start: 2022-05-13

## 2022-05-06 RX ADMIN — CYANOCOBALAMIN 1000 MCG: 1000 INJECTION, SOLUTION INTRAMUSCULAR at 01:05

## 2022-05-06 RX ADMIN — SODIUM CHLORIDE 125 MG: 9 INJECTION, SOLUTION INTRAVENOUS at 01:05

## 2022-05-06 RX ADMIN — SODIUM CHLORIDE, PRESERVATIVE FREE 10 ML: 5 INJECTION INTRAVENOUS at 03:05

## 2022-05-06 NOTE — PLAN OF CARE
Problem: Fatigue  Goal: Improved Activity Tolerance  5/6/2022 1258 by Margaret Mcclain RN  Outcome: Met  5/6/2022 1258 by Margaret Mcclain RN  Outcome: Ongoing, Progressing

## 2022-05-17 DIAGNOSIS — Z12.31 ENCOUNTER FOR SCREENING MAMMOGRAM FOR MALIGNANT NEOPLASM OF BREAST: Primary | ICD-10-CM

## 2022-05-20 ENCOUNTER — INFUSION (OUTPATIENT)
Dept: INFUSION THERAPY | Facility: HOSPITAL | Age: 67
End: 2022-05-20
Attending: INTERNAL MEDICINE
Payer: MEDICARE

## 2022-05-20 VITALS
OXYGEN SATURATION: 95 % | HEART RATE: 84 BPM | RESPIRATION RATE: 20 BRPM | HEIGHT: 63 IN | WEIGHT: 218.31 LBS | BODY MASS INDEX: 38.68 KG/M2 | TEMPERATURE: 98 F | SYSTOLIC BLOOD PRESSURE: 153 MMHG | DIASTOLIC BLOOD PRESSURE: 91 MMHG

## 2022-05-20 DIAGNOSIS — E53.8 B12 DEFICIENCY: Primary | ICD-10-CM

## 2022-05-20 PROCEDURE — 63600175 PHARM REV CODE 636 W HCPCS: Performed by: INTERNAL MEDICINE

## 2022-05-20 PROCEDURE — 96372 THER/PROPH/DIAG INJ SC/IM: CPT

## 2022-05-20 RX ORDER — EPINEPHRINE 0.3 MG/.3ML
0.3 INJECTION SUBCUTANEOUS ONCE AS NEEDED
Status: CANCELLED | OUTPATIENT
Start: 2022-05-27

## 2022-05-20 RX ORDER — CYANOCOBALAMIN 1000 UG/ML
1000 INJECTION, SOLUTION INTRAMUSCULAR; SUBCUTANEOUS
Status: CANCELLED | OUTPATIENT
Start: 2022-05-20

## 2022-05-20 RX ORDER — CYANOCOBALAMIN 1000 UG/ML
1000 INJECTION, SOLUTION INTRAMUSCULAR; SUBCUTANEOUS ONCE
Status: CANCELLED
Start: 2022-05-27

## 2022-05-20 RX ORDER — HEPARIN 100 UNIT/ML
500 SYRINGE INTRAVENOUS
Status: CANCELLED | OUTPATIENT
Start: 2022-05-27

## 2022-05-20 RX ORDER — CYANOCOBALAMIN 1000 UG/ML
1000 INJECTION, SOLUTION INTRAMUSCULAR; SUBCUTANEOUS
Status: COMPLETED | OUTPATIENT
Start: 2022-05-20 | End: 2022-05-20

## 2022-05-20 RX ORDER — METHYLPREDNISOLONE SOD SUCC 125 MG
125 VIAL (EA) INJECTION ONCE AS NEEDED
Status: CANCELLED | OUTPATIENT
Start: 2022-05-27

## 2022-05-20 RX ORDER — DIPHENHYDRAMINE HYDROCHLORIDE 50 MG/ML
50 INJECTION INTRAMUSCULAR; INTRAVENOUS ONCE AS NEEDED
Status: CANCELLED | OUTPATIENT
Start: 2022-05-27

## 2022-05-20 RX ORDER — SODIUM CHLORIDE 0.9 % (FLUSH) 0.9 %
10 SYRINGE (ML) INJECTION
Status: CANCELLED | OUTPATIENT
Start: 2022-05-27

## 2022-05-20 RX ADMIN — CYANOCOBALAMIN 1000 MCG: 1000 INJECTION, SOLUTION INTRAMUSCULAR at 09:05

## 2022-05-20 NOTE — PLAN OF CARE
Problem: Fall Injury Risk  Goal: Absence of Fall and Fall-Related Injury  Outcome: Ongoing, Progressing  Intervention: Identify and Manage Contributors  Flowsheets (Taken 5/20/2022 0908)  Self-Care Promotion: independence encouraged  Medication Review/Management: medications reviewed  Intervention: Promote Injury-Free Environment  Flowsheets (Taken 5/20/2022 0908)  Safety Promotion/Fall Prevention: medications reviewed

## 2022-06-17 ENCOUNTER — INFUSION (OUTPATIENT)
Dept: INFUSION THERAPY | Facility: HOSPITAL | Age: 67
End: 2022-06-17
Attending: INTERNAL MEDICINE
Payer: MEDICARE

## 2022-06-17 VITALS
TEMPERATURE: 98 F | RESPIRATION RATE: 20 BRPM | BODY MASS INDEX: 38.51 KG/M2 | HEART RATE: 83 BPM | DIASTOLIC BLOOD PRESSURE: 77 MMHG | WEIGHT: 217.38 LBS | SYSTOLIC BLOOD PRESSURE: 121 MMHG | OXYGEN SATURATION: 97 %

## 2022-06-17 DIAGNOSIS — E53.8 B12 DEFICIENCY: Primary | ICD-10-CM

## 2022-06-17 PROCEDURE — 96372 THER/PROPH/DIAG INJ SC/IM: CPT

## 2022-06-17 PROCEDURE — 63600175 PHARM REV CODE 636 W HCPCS: Performed by: INTERNAL MEDICINE

## 2022-06-17 RX ORDER — CYANOCOBALAMIN 1000 UG/ML
1000 INJECTION, SOLUTION INTRAMUSCULAR; SUBCUTANEOUS
Status: CANCELLED | OUTPATIENT
Start: 2022-06-17

## 2022-06-17 RX ORDER — CYANOCOBALAMIN 1000 UG/ML
1000 INJECTION, SOLUTION INTRAMUSCULAR; SUBCUTANEOUS
Status: COMPLETED | OUTPATIENT
Start: 2022-06-17 | End: 2022-06-17

## 2022-06-17 RX ADMIN — CYANOCOBALAMIN 1000 MCG: 1000 INJECTION, SOLUTION INTRAMUSCULAR at 09:06

## 2022-06-30 ENCOUNTER — LAB VISIT (OUTPATIENT)
Dept: LAB | Facility: HOSPITAL | Age: 67
End: 2022-06-30
Attending: INTERNAL MEDICINE
Payer: MEDICARE

## 2022-06-30 ENCOUNTER — OFFICE VISIT (OUTPATIENT)
Dept: HEMATOLOGY/ONCOLOGY | Facility: CLINIC | Age: 67
End: 2022-06-30
Payer: MEDICARE

## 2022-06-30 VITALS
WEIGHT: 219 LBS | SYSTOLIC BLOOD PRESSURE: 156 MMHG | HEART RATE: 89 BPM | DIASTOLIC BLOOD PRESSURE: 96 MMHG | BODY MASS INDEX: 37.39 KG/M2 | RESPIRATION RATE: 18 BRPM | HEIGHT: 64 IN

## 2022-06-30 DIAGNOSIS — E53.8 B12 DEFICIENCY: ICD-10-CM

## 2022-06-30 DIAGNOSIS — D50.0 IRON DEFICIENCY ANEMIA DUE TO CHRONIC BLOOD LOSS: Primary | ICD-10-CM

## 2022-06-30 DIAGNOSIS — D50.0 IRON DEFICIENCY ANEMIA DUE TO CHRONIC BLOOD LOSS: ICD-10-CM

## 2022-06-30 LAB
ALBUMIN SERPL BCP-MCNC: 4.1 G/DL (ref 3.5–5.2)
ALP SERPL-CCNC: 56 U/L (ref 55–135)
ALT SERPL W/O P-5'-P-CCNC: 20 U/L (ref 10–44)
ANION GAP SERPL CALC-SCNC: 9 MMOL/L (ref 8–16)
AST SERPL-CCNC: 29 U/L (ref 10–40)
BASOPHILS # BLD AUTO: 0.08 K/UL (ref 0–0.2)
BASOPHILS NFR BLD: 0.7 % (ref 0–1.9)
BILIRUB SERPL-MCNC: 0.1 MG/DL (ref 0.1–1)
BUN SERPL-MCNC: 10 MG/DL (ref 8–23)
CALCIUM SERPL-MCNC: 9.8 MG/DL (ref 8.7–10.5)
CHLORIDE SERPL-SCNC: 101 MMOL/L (ref 95–110)
CO2 SERPL-SCNC: 28 MMOL/L (ref 23–29)
CREAT SERPL-MCNC: 0.8 MG/DL (ref 0.5–1.4)
DIFFERENTIAL METHOD: ABNORMAL
EOSINOPHIL # BLD AUTO: 0.2 K/UL (ref 0–0.5)
EOSINOPHIL NFR BLD: 2.1 % (ref 0–8)
ERYTHROCYTE [DISTWIDTH] IN BLOOD BY AUTOMATED COUNT: 19.2 % (ref 11.5–14.5)
EST. GFR  (AFRICAN AMERICAN): >60 ML/MIN/1.73 M^2
EST. GFR  (NON AFRICAN AMERICAN): >60 ML/MIN/1.73 M^2
FERRITIN SERPL-MCNC: 9 NG/ML (ref 20–300)
GLUCOSE SERPL-MCNC: 97 MG/DL (ref 70–110)
HCT VFR BLD AUTO: 40.1 % (ref 37–48.5)
HGB BLD-MCNC: 12.5 G/DL (ref 12–16)
IMM GRANULOCYTES # BLD AUTO: 0.04 K/UL (ref 0–0.04)
IMM GRANULOCYTES NFR BLD AUTO: 0.3 % (ref 0–0.5)
LYMPHOCYTES # BLD AUTO: 3.1 K/UL (ref 1–4.8)
LYMPHOCYTES NFR BLD: 26.5 % (ref 18–48)
MCH RBC QN AUTO: 25.6 PG (ref 27–31)
MCHC RBC AUTO-ENTMCNC: 31.2 G/DL (ref 32–36)
MCV RBC AUTO: 82 FL (ref 82–98)
MONOCYTES # BLD AUTO: 1.1 K/UL (ref 0.3–1)
MONOCYTES NFR BLD: 9.4 % (ref 4–15)
NEUTROPHILS # BLD AUTO: 7.1 K/UL (ref 1.8–7.7)
NEUTROPHILS NFR BLD: 61 % (ref 38–73)
NRBC BLD-RTO: 0 /100 WBC
PLATELET # BLD AUTO: 350 K/UL (ref 150–450)
PMV BLD AUTO: 9.4 FL (ref 9.2–12.9)
POTASSIUM SERPL-SCNC: 4 MMOL/L (ref 3.5–5.1)
PROT SERPL-MCNC: 7.9 G/DL (ref 6–8.4)
RBC # BLD AUTO: 4.88 M/UL (ref 4–5.4)
SODIUM SERPL-SCNC: 138 MMOL/L (ref 136–145)
VIT B12 SERPL-MCNC: 404 PG/ML (ref 210–950)
WBC # BLD AUTO: 11.55 K/UL (ref 3.9–12.7)

## 2022-06-30 PROCEDURE — 80053 COMPREHEN METABOLIC PANEL: CPT | Performed by: INTERNAL MEDICINE

## 2022-06-30 PROCEDURE — 99214 PR OFFICE/OUTPT VISIT, EST, LEVL IV, 30-39 MIN: ICD-10-PCS | Mod: S$GLB,,, | Performed by: INTERNAL MEDICINE

## 2022-06-30 PROCEDURE — 85025 COMPLETE CBC W/AUTO DIFF WBC: CPT | Performed by: INTERNAL MEDICINE

## 2022-06-30 PROCEDURE — 99214 OFFICE O/P EST MOD 30 MIN: CPT | Mod: S$GLB,,, | Performed by: INTERNAL MEDICINE

## 2022-06-30 PROCEDURE — 82607 VITAMIN B-12: CPT | Performed by: INTERNAL MEDICINE

## 2022-06-30 PROCEDURE — 36415 COLL VENOUS BLD VENIPUNCTURE: CPT | Performed by: INTERNAL MEDICINE

## 2022-06-30 PROCEDURE — 82728 ASSAY OF FERRITIN: CPT | Performed by: INTERNAL MEDICINE

## 2022-07-10 DIAGNOSIS — E53.8 B12 DEFICIENCY: Primary | ICD-10-CM

## 2022-07-10 RX ORDER — SODIUM CHLORIDE 0.9 % (FLUSH) 0.9 %
10 SYRINGE (ML) INJECTION
Status: CANCELLED | OUTPATIENT
Start: 2022-07-20

## 2022-07-10 RX ORDER — CYANOCOBALAMIN 1000 UG/ML
1000 INJECTION, SOLUTION INTRAMUSCULAR; SUBCUTANEOUS ONCE
Status: CANCELLED | OUTPATIENT
Start: 2022-07-15

## 2022-07-10 RX ORDER — HEPARIN 100 UNIT/ML
500 SYRINGE INTRAVENOUS
Status: CANCELLED | OUTPATIENT
Start: 2022-07-20

## 2022-07-10 RX ORDER — EPINEPHRINE 0.3 MG/.3ML
0.3 INJECTION SUBCUTANEOUS ONCE AS NEEDED
Status: CANCELLED | OUTPATIENT
Start: 2022-07-20

## 2022-07-10 RX ORDER — DIPHENHYDRAMINE HYDROCHLORIDE 50 MG/ML
50 INJECTION INTRAMUSCULAR; INTRAVENOUS ONCE AS NEEDED
Status: CANCELLED | OUTPATIENT
Start: 2022-07-20

## 2022-07-10 RX ORDER — METHYLPREDNISOLONE SOD SUCC 125 MG
125 VIAL (EA) INJECTION ONCE AS NEEDED
Status: CANCELLED | OUTPATIENT
Start: 2022-07-20

## 2022-07-11 RX ORDER — CYANOCOBALAMIN 1000 UG/ML
1000 INJECTION, SOLUTION INTRAMUSCULAR; SUBCUTANEOUS
Qty: 3 ML | Refills: 4 | Status: SHIPPED | OUTPATIENT
Start: 2022-07-11 | End: 2023-07-27

## 2022-07-11 NOTE — TELEPHONE ENCOUNTER
Spoke with pt. She verbalized understanding.    Pt would like b12 injections sent to [place] pharmacy.

## 2022-07-11 NOTE — TELEPHONE ENCOUNTER
Tell her the anemia is resolved, she has made 5 pints of blood with the Fe infusions.  And B 12 is better with the B 12 shots.    Tell her that I want to give an additional 4 weeks of Fe infusions to try and increase her Fe reserves or savings for future use.    Will continue the B 12 monthly.    See me and recheck lab in 3 months.

## 2022-07-11 NOTE — PROGRESS NOTES
[facility] hematology Oncology in office subsequent encounter note  6/30/22  BRENDA Rios, Hazel Tavera        Presented with LUQ pain.  Had splenic artery thrombus with splenic infarct.  On Eliquis 5mg BID.  No LUQ pain sx now.    Coagulation evaluation increased Factor 8 and increased Anti cardiolipid IgM antibody levels.  Persistently increased on repeat determination.    Estimate 30% risk of clot event over next 2-3 years.    Continue Eliquis 5mg BID therapeutic and prophylaxis.    She admits to blurred vision and dizzyness intermittently, sx predated the splenic infarct and have not changed while on Eliquis.    History of COPD.  On 02 24/7.    Re check her CAT of abdomen  regards status of splenic infarct showed resolution of splenic infarct area. 2019.    Continue Eliquis BID.      She is seen nurse practitioner Gabriel for general care and for thyroid management.     She saw her cardiologist, Dr. Oliva, due for chemical stress test.    Her son had COVID, but was asymptomatic.  She did not have symptoms and isolated herself.    She has had 2 of 2 vaccines and the booster shot and the flu shot.  She received the pneumonia vaccination 2 years ago.  She has not taken the shingles vaccine.    She has COPD and is on oxygen 2 liters/minute 24/7.     No acute distress, does not appear chronically ill.  She does not have palpable lymphadenopathy.  Her lungs are clear bilaterally.  She has regular rhythm without murmur.  Abdomen is flat without distention, abdomen is nontender without palpable hepatomegaly or splenomegaly or abdominal mass.  Calves are nontender without edema, no petechiae or purpura.  Neurologically grossly intact    History of splenic infarct with  Increased factor 8 at 250 and increased anticardiolipin antibody at 13.  Now.    Continue Eliquis 5 mg b.i.d..  Return to clinic in 6 months.  Recheck factor 8 and anticardiolipin antibody in July 2022.     H/H 6/20  Ferritin 5  B 12  69    Completed  Ferrlicet x's8 weeks  B 12 monthly.  Stool for occult blood. Was heme negative.    Energy 2/10 to 7/10.  Hgb 6.9 to 12.  B 12 better 404.  But ferritin is 9.  Give additional 4 weeks of ferrlicit.    RTc 3 months with CBC, ferritin, B 12.

## 2022-07-15 ENCOUNTER — INFUSION (OUTPATIENT)
Dept: INFUSION THERAPY | Facility: HOSPITAL | Age: 67
End: 2022-07-15
Attending: INTERNAL MEDICINE
Payer: MEDICARE

## 2022-07-15 VITALS
HEART RATE: 66 BPM | DIASTOLIC BLOOD PRESSURE: 65 MMHG | TEMPERATURE: 98 F | SYSTOLIC BLOOD PRESSURE: 126 MMHG | OXYGEN SATURATION: 98 % | WEIGHT: 220.5 LBS | HEIGHT: 64 IN | RESPIRATION RATE: 18 BRPM | BODY MASS INDEX: 37.65 KG/M2

## 2022-07-15 DIAGNOSIS — D50.0 IRON DEFICIENCY ANEMIA DUE TO CHRONIC BLOOD LOSS: ICD-10-CM

## 2022-07-15 DIAGNOSIS — E53.8 B12 DEFICIENCY: Primary | ICD-10-CM

## 2022-07-15 PROCEDURE — 25000003 PHARM REV CODE 250: Performed by: INTERNAL MEDICINE

## 2022-07-15 PROCEDURE — 96365 THER/PROPH/DIAG IV INF INIT: CPT

## 2022-07-15 PROCEDURE — 63600175 PHARM REV CODE 636 W HCPCS: Performed by: INTERNAL MEDICINE

## 2022-07-15 RX ORDER — EPINEPHRINE 0.3 MG/.3ML
0.3 INJECTION SUBCUTANEOUS ONCE AS NEEDED
Status: CANCELLED | OUTPATIENT
Start: 2022-07-22

## 2022-07-15 RX ORDER — METHYLPREDNISOLONE SOD SUCC 125 MG
125 VIAL (EA) INJECTION ONCE AS NEEDED
Status: CANCELLED | OUTPATIENT
Start: 2022-07-22

## 2022-07-15 RX ORDER — CYANOCOBALAMIN 1000 UG/ML
1000 INJECTION, SOLUTION INTRAMUSCULAR; SUBCUTANEOUS ONCE
Status: CANCELLED | OUTPATIENT
Start: 2022-08-12

## 2022-07-15 RX ORDER — SODIUM CHLORIDE 0.9 % (FLUSH) 0.9 %
10 SYRINGE (ML) INJECTION
Status: CANCELLED | OUTPATIENT
Start: 2022-07-22

## 2022-07-15 RX ORDER — HEPARIN 100 UNIT/ML
500 SYRINGE INTRAVENOUS
Status: CANCELLED | OUTPATIENT
Start: 2022-07-22

## 2022-07-15 RX ORDER — DIPHENHYDRAMINE HYDROCHLORIDE 50 MG/ML
50 INJECTION INTRAMUSCULAR; INTRAVENOUS ONCE AS NEEDED
Status: CANCELLED | OUTPATIENT
Start: 2022-07-22

## 2022-07-15 RX ORDER — CYANOCOBALAMIN 1000 UG/ML
1000 INJECTION, SOLUTION INTRAMUSCULAR; SUBCUTANEOUS ONCE
Status: COMPLETED | OUTPATIENT
Start: 2022-07-15 | End: 2022-07-15

## 2022-07-15 RX ADMIN — SODIUM CHLORIDE 125 MG: 9 INJECTION, SOLUTION INTRAVENOUS at 10:07

## 2022-07-15 RX ADMIN — SODIUM CHLORIDE: 0.9 INJECTION, SOLUTION INTRAVENOUS at 10:07

## 2022-07-15 NOTE — PLAN OF CARE
Problem: Anemia  Goal: Anemia Symptom Improvement  Outcome: Met     Problem: Activity Intolerance  Goal: Enhanced Capacity and Energy  Outcome: Met     Problem: Breathing Pattern Ineffective  Goal: Effective Breathing Pattern  Outcome: Met

## 2022-07-22 ENCOUNTER — INFUSION (OUTPATIENT)
Dept: INFUSION THERAPY | Facility: HOSPITAL | Age: 67
End: 2022-07-22
Attending: INTERNAL MEDICINE
Payer: MEDICARE

## 2022-07-22 VITALS
TEMPERATURE: 98 F | HEART RATE: 70 BPM | SYSTOLIC BLOOD PRESSURE: 116 MMHG | DIASTOLIC BLOOD PRESSURE: 69 MMHG | WEIGHT: 219 LBS | BODY MASS INDEX: 37.39 KG/M2 | HEIGHT: 64 IN | OXYGEN SATURATION: 95 % | RESPIRATION RATE: 18 BRPM

## 2022-07-22 DIAGNOSIS — D50.0 IRON DEFICIENCY ANEMIA DUE TO CHRONIC BLOOD LOSS: ICD-10-CM

## 2022-07-22 DIAGNOSIS — E53.8 B12 DEFICIENCY: Primary | ICD-10-CM

## 2022-07-22 PROCEDURE — 25000003 PHARM REV CODE 250: Performed by: INTERNAL MEDICINE

## 2022-07-22 PROCEDURE — 96365 THER/PROPH/DIAG IV INF INIT: CPT

## 2022-07-22 PROCEDURE — 63600175 PHARM REV CODE 636 W HCPCS: Performed by: INTERNAL MEDICINE

## 2022-07-22 RX ORDER — SODIUM CHLORIDE 0.9 % (FLUSH) 0.9 %
10 SYRINGE (ML) INJECTION
Status: CANCELLED | OUTPATIENT
Start: 2022-07-29

## 2022-07-22 RX ORDER — METHYLPREDNISOLONE SOD SUCC 125 MG
125 VIAL (EA) INJECTION ONCE AS NEEDED
Status: CANCELLED | OUTPATIENT
Start: 2022-07-29

## 2022-07-22 RX ORDER — EPINEPHRINE 0.3 MG/.3ML
0.3 INJECTION SUBCUTANEOUS ONCE AS NEEDED
Status: CANCELLED | OUTPATIENT
Start: 2022-07-29

## 2022-07-22 RX ORDER — DIPHENHYDRAMINE HYDROCHLORIDE 50 MG/ML
50 INJECTION INTRAMUSCULAR; INTRAVENOUS ONCE AS NEEDED
Status: CANCELLED | OUTPATIENT
Start: 2022-07-29

## 2022-07-22 RX ORDER — SODIUM CHLORIDE 0.9 % (FLUSH) 0.9 %
10 SYRINGE (ML) INJECTION
Status: DISCONTINUED | OUTPATIENT
Start: 2022-07-22 | End: 2022-07-22 | Stop reason: HOSPADM

## 2022-07-22 RX ORDER — HEPARIN 100 UNIT/ML
500 SYRINGE INTRAVENOUS
Status: CANCELLED | OUTPATIENT
Start: 2022-07-29

## 2022-07-22 RX ADMIN — SODIUM CHLORIDE: 0.9 INJECTION, SOLUTION INTRAVENOUS at 01:07

## 2022-07-22 RX ADMIN — SODIUM CHLORIDE 125 MG: 9 INJECTION, SOLUTION INTRAVENOUS at 01:07

## 2022-07-29 ENCOUNTER — INFUSION (OUTPATIENT)
Dept: INFUSION THERAPY | Facility: HOSPITAL | Age: 67
End: 2022-07-29
Attending: INTERNAL MEDICINE
Payer: MEDICARE

## 2022-07-29 VITALS
DIASTOLIC BLOOD PRESSURE: 69 MMHG | BODY MASS INDEX: 38.68 KG/M2 | HEIGHT: 63 IN | SYSTOLIC BLOOD PRESSURE: 132 MMHG | WEIGHT: 218.31 LBS | TEMPERATURE: 98 F | HEART RATE: 69 BPM | OXYGEN SATURATION: 96 % | RESPIRATION RATE: 16 BRPM

## 2022-07-29 DIAGNOSIS — D50.0 IRON DEFICIENCY ANEMIA DUE TO CHRONIC BLOOD LOSS: ICD-10-CM

## 2022-07-29 DIAGNOSIS — E53.8 B12 DEFICIENCY: Primary | ICD-10-CM

## 2022-07-29 PROCEDURE — 96365 THER/PROPH/DIAG IV INF INIT: CPT

## 2022-07-29 PROCEDURE — 25000003 PHARM REV CODE 250: Performed by: INTERNAL MEDICINE

## 2022-07-29 PROCEDURE — 63600175 PHARM REV CODE 636 W HCPCS: Performed by: INTERNAL MEDICINE

## 2022-07-29 PROCEDURE — A4216 STERILE WATER/SALINE, 10 ML: HCPCS | Performed by: INTERNAL MEDICINE

## 2022-07-29 RX ORDER — HEPARIN 100 UNIT/ML
500 SYRINGE INTRAVENOUS
Status: CANCELLED | OUTPATIENT
Start: 2022-08-05

## 2022-07-29 RX ORDER — EPINEPHRINE 0.3 MG/.3ML
0.3 INJECTION SUBCUTANEOUS ONCE AS NEEDED
Status: CANCELLED | OUTPATIENT
Start: 2022-08-05

## 2022-07-29 RX ORDER — SODIUM CHLORIDE 0.9 % (FLUSH) 0.9 %
10 SYRINGE (ML) INJECTION
Status: CANCELLED | OUTPATIENT
Start: 2022-08-05

## 2022-07-29 RX ORDER — METHYLPREDNISOLONE SOD SUCC 125 MG
125 VIAL (EA) INJECTION ONCE AS NEEDED
Status: CANCELLED | OUTPATIENT
Start: 2022-08-05

## 2022-07-29 RX ORDER — DIPHENHYDRAMINE HYDROCHLORIDE 50 MG/ML
50 INJECTION INTRAMUSCULAR; INTRAVENOUS ONCE AS NEEDED
Status: CANCELLED | OUTPATIENT
Start: 2022-08-05

## 2022-07-29 RX ORDER — SODIUM CHLORIDE 0.9 % (FLUSH) 0.9 %
10 SYRINGE (ML) INJECTION
Status: DISCONTINUED | OUTPATIENT
Start: 2022-07-29 | End: 2022-07-29 | Stop reason: HOSPADM

## 2022-07-29 RX ADMIN — SODIUM CHLORIDE: 9 INJECTION, SOLUTION INTRAVENOUS at 01:07

## 2022-07-29 RX ADMIN — SODIUM CHLORIDE 125 MG: 9 INJECTION, SOLUTION INTRAVENOUS at 01:07

## 2022-07-29 RX ADMIN — SODIUM CHLORIDE, PRESERVATIVE FREE 10 ML: 5 INJECTION INTRAVENOUS at 03:07

## 2022-07-29 NOTE — PLAN OF CARE
Problem: Fall Injury Risk  Goal: Absence of Fall and Fall-Related Injury  Outcome: Ongoing, Progressing  Intervention: Identify and Manage Contributors  Flowsheets (Taken 7/29/2022 1248)  Self-Care Promotion: independence encouraged  Medication Review/Management: medications reviewed  Intervention: Promote Injury-Free Environment  Flowsheets (Taken 7/29/2022 1248)  Safety Promotion/Fall Prevention: medications reviewed

## 2022-08-05 ENCOUNTER — INFUSION (OUTPATIENT)
Dept: INFUSION THERAPY | Facility: HOSPITAL | Age: 67
End: 2022-08-05
Attending: INTERNAL MEDICINE
Payer: MEDICARE

## 2022-08-05 VITALS
TEMPERATURE: 98 F | DIASTOLIC BLOOD PRESSURE: 68 MMHG | SYSTOLIC BLOOD PRESSURE: 100 MMHG | RESPIRATION RATE: 18 BRPM | BODY MASS INDEX: 37.3 KG/M2 | HEART RATE: 74 BPM | WEIGHT: 218.5 LBS | HEIGHT: 64 IN | OXYGEN SATURATION: 95 %

## 2022-08-05 DIAGNOSIS — D50.0 IRON DEFICIENCY ANEMIA DUE TO CHRONIC BLOOD LOSS: ICD-10-CM

## 2022-08-05 DIAGNOSIS — E53.8 B12 DEFICIENCY: Primary | ICD-10-CM

## 2022-08-05 PROCEDURE — 25000003 PHARM REV CODE 250: Performed by: INTERNAL MEDICINE

## 2022-08-05 PROCEDURE — 96365 THER/PROPH/DIAG IV INF INIT: CPT

## 2022-08-05 PROCEDURE — 63600175 PHARM REV CODE 636 W HCPCS: Performed by: INTERNAL MEDICINE

## 2022-08-05 RX ORDER — DIPHENHYDRAMINE HYDROCHLORIDE 50 MG/ML
50 INJECTION INTRAMUSCULAR; INTRAVENOUS ONCE AS NEEDED
Status: CANCELLED | OUTPATIENT
Start: 2022-08-12

## 2022-08-05 RX ORDER — HEPARIN 100 UNIT/ML
500 SYRINGE INTRAVENOUS
Status: CANCELLED | OUTPATIENT
Start: 2022-08-12

## 2022-08-05 RX ORDER — METHYLPREDNISOLONE SOD SUCC 125 MG
125 VIAL (EA) INJECTION ONCE AS NEEDED
Status: CANCELLED | OUTPATIENT
Start: 2022-08-12

## 2022-08-05 RX ORDER — SODIUM CHLORIDE 0.9 % (FLUSH) 0.9 %
10 SYRINGE (ML) INJECTION
Status: CANCELLED | OUTPATIENT
Start: 2022-08-12

## 2022-08-05 RX ORDER — EPINEPHRINE 0.3 MG/.3ML
0.3 INJECTION SUBCUTANEOUS ONCE AS NEEDED
Status: CANCELLED | OUTPATIENT
Start: 2022-08-12

## 2022-08-05 RX ORDER — SODIUM CHLORIDE 0.9 % (FLUSH) 0.9 %
10 SYRINGE (ML) INJECTION
Status: DISCONTINUED | OUTPATIENT
Start: 2022-08-05 | End: 2022-08-05 | Stop reason: HOSPADM

## 2022-08-05 RX ADMIN — SODIUM CHLORIDE 125 MG: 9 INJECTION, SOLUTION INTRAVENOUS at 01:08

## 2022-08-05 RX ADMIN — SODIUM CHLORIDE: 9 INJECTION, SOLUTION INTRAVENOUS at 01:08

## 2022-08-05 NOTE — PLAN OF CARE
Problem: Fatigue  Goal: Improved Activity Tolerance  Outcome: Ongoing, Progressing  Intervention: Promote Improved Energy  Flowsheets (Taken 8/5/2022 1328)  Fatigue Management:   paced activity encouraged   fatigue-related activity identified   frequent rest breaks encouraged  Sleep/Rest Enhancement:   regular sleep/rest pattern promoted   reading promoted  Activity Management: Ambulated -L4

## 2022-08-31 ENCOUNTER — OFFICE VISIT (OUTPATIENT)
Dept: PULMONOLOGY | Facility: CLINIC | Age: 67
End: 2022-08-31
Payer: MEDICARE

## 2022-08-31 VITALS
BODY MASS INDEX: 38.19 KG/M2 | HEART RATE: 84 BPM | DIASTOLIC BLOOD PRESSURE: 80 MMHG | WEIGHT: 219 LBS | OXYGEN SATURATION: 97 % | SYSTOLIC BLOOD PRESSURE: 125 MMHG

## 2022-08-31 DIAGNOSIS — Z87.891 PERSONAL HISTORY OF TOBACCO USE, PRESENTING HAZARDS TO HEALTH: ICD-10-CM

## 2022-08-31 DIAGNOSIS — J44.9 CHRONIC OBSTRUCTIVE PULMONARY DISEASE, UNSPECIFIED COPD TYPE: ICD-10-CM

## 2022-08-31 DIAGNOSIS — R09.02 HYPOXEMIA: ICD-10-CM

## 2022-08-31 DIAGNOSIS — R76.0 ANTI-CARDIOLIPIN ANTIBODY POSITIVE: ICD-10-CM

## 2022-08-31 PROCEDURE — 99214 OFFICE O/P EST MOD 30 MIN: CPT | Mod: S$GLB,,, | Performed by: INTERNAL MEDICINE

## 2022-08-31 PROCEDURE — 99214 PR OFFICE/OUTPT VISIT, EST, LEVL IV, 30-39 MIN: ICD-10-PCS | Mod: S$GLB,,, | Performed by: INTERNAL MEDICINE

## 2022-08-31 RX ORDER — BUDESONIDE, GLYCOPYRROLATE, AND FORMOTEROL FUMARATE 160; 9; 4.8 UG/1; UG/1; UG/1
2 AEROSOL, METERED RESPIRATORY (INHALATION) 2 TIMES DAILY
Qty: 10.7 G | Refills: 5 | Status: SHIPPED | OUTPATIENT
Start: 2022-08-31 | End: 2023-12-22

## 2022-08-31 NOTE — PROGRESS NOTES
Office Visit Note *    Patient Name: Clarita Gómez  MRN: [id number]  : 1955      Reason for visit: COPD    HPI:     3/31/2021 - 64 yo female diagnosed with COPD a few years ago (unsure of severity) currently on ICS/LABA (changed from BREO to SYMBICORT) referred here for evaluation.  Has chronic SOB, BOWERS, intermittent wheezing.  She continues to smoke, currently 1/2 PPD (smoked about 2 PPD for 40+ years).  She states that she has not been able to fully quit.  Has never had a LDSCT chest (has a brother with h/o lung cancer).  Also has a h/o anticardiolipin antibody and elevated factor VIII and has been on chronic anticoagulation (sees Dr Mayer).  ROS as below.    5/10/2021 - Here for follow up, reviewed PFT with pt (FEV1 - 30%, DLCO - 12%), walk test (showed need fpr 2 LPM) and CT scan.  We will plan to change to TRELEGY (sample given and instructed).  She got Covid vaccine (Moderna)    2021 - Here for follow up, Pt is currently stable on present medications with no recent increases in their symptoms or use of rescue medications.  Since our last visit there have been no hospitalizations or ER visits for their respiratory issues and there does not seem to be anything to suggest unrecognized exacerbations.  I have reviewed the medical regimen and re-educated the pt on the role of rescue and controlling medications.  Inhaler technique and understanding seems adequate.  The patient reports no issues with any of there medications for their COPD.  Refills will be taken care of as needed.  All questions answered.  Patient has no known corona virus exposures and has been practicing social distancing.  We have discussed the virus and precautions and all questions have been answered.  We did have a discussion about booster shots.  She had a son who had Covid back in March but she did not get it then.  Still smoking about 1/2 PPD - we discussed this.    11/15/2021 - Here for follow up, Pt is currently stable on  "present medications with no recent increases in their symptoms or use of rescue medications.  Since our last visit there have been no hospitalizations or ER visits for their respiratory issues and there does not seem to be anything to suggest unrecognized exacerbations.  I have reviewed the medical regimen and re-educated the pt on the role of rescue and controlling medications.  Inhaler technique and understanding seems adequate.  The patient reports no issues with any of there medications for their COPD.  Refills will be taken care of as needed.  All questions answered.  Doesn't feel that TRELEGY "holds her" all day and wants to try BREZTRI to see if twice daily dosing helps.  Still smoking 10 cigarettes per day (we discussed this).  Has some dysphagia and is to see her PCP.  Patient has no known corona virus exposures and has been practicing social distancing.  We have discussed the virus and precautions and all questions have been answered.  Has had Covid vaccine (Moderna and plans to get booster)    8/31/2022 - Here for follow up, she felt that she did better on BREZTRI but insurance wouldn't cover and she is back on TRELEGY  but doesn't feel that she is "covered" for the whole day.  Pt is currently stable on present medications with no recent increases in their symptoms or use of rescue medications.  Since our last visit there have been no hospitalizations or ER visits for their respiratory issues and there does not seem to be anything to suggest unrecognized exacerbations.  I have reviewed the medical regimen and re-educated the pt on the role of rescue and controlling medications.  Inhaler technique and understanding seems adequate.  The patient reports no issues with any of there medications for their COPD.  Refills will be taken care of as needed.  All questions answered.  Still smoking about 10 cigarettes per day, did try to stop but didn't tolerate it.   Dysphagia is about the same - choking on stringy " meats (we discussed this) and I have recommended that she see a GI MD.  Patient has no known corona virus exposures and has been practicing social distancing.  We have discussed the virus and precautions and all questions have been answered.  She has had Covid vaccine and 2 boosters    COPD Flowsheet    GOLD III D   Last PFT - 4/21  FEV1- 30 % DLCO - 12 %     + ICS/LABA/LAMA    + prn JAYLA    mMRC -  0 - SOB with strenuous exercise   -  1 - SOB level ground, slight hill   -  2 - SOB walk slower or stop for breath level ground   - + 3 - SOB at 100 yards or after few minutes   -  4 - SOB in house, dressing    Referral to PULMONARY REHABILITATION -  NO    Vitamin D tested - no    Testosterone checked (male) - na    Tested for alpha-1-antitrypsin - NO      Low Dose Screening CT Chest    Cigarettes - 2 PPD x 40 YEARS  Still smoking -  YES  QUIT no    Date of last LD CT - 4/21    Result - scarring - needs repeat now    I have discussed with pt about using a screening CT of the chest due to history of cigarette smoking.  We have discussed the possible findings and possible actions as a result of these findings.  The pt would like to proceed.    Vaccination Status    Flu vaccination status - 21/22    Pneumonia vaccination status - +    Covid vaccination status - + (Moderna) and booster x 2    Tetanus/diptheria vaccination status - unsure    Shingles vaccination status - No    Home Oxygen Qualification    Qualifier - O2 sat 87 on room air  (,exertion    Date - 4/2021          O2 sat 94% on 2 LPM via nasal cannuli    DX - COPD    Face to face visit with pt concerning the need for O2 therapy, all questions answered.  I stressed the need for compliance.  Pt to call with any questions.              Past Medical History    Past Medical History:   Diagnosis Date    Anticardiolipin antibody positive     COPD (chronic obstructive pulmonary disease)     Hyperlipidemia     Hypothyroid     Personal history of tobacco use, presenting  "hazards to health     Splenic infarct        Past Surgical History    Past Surgical History:   Procedure Laterality Date    CHOLECYSTECTOMY      COLECTOMY      partial due to diverticulosis    HYSTERECTOMY         Medications      Current Outpatient Medications:     acetaminophen (TYLENOL) 500 MG tablet, Take 500 mg by mouth every 6 (six) hours as needed for Pain., Disp: , Rfl:     albuterol (PROVENTIL) 2.5 mg /3 mL (0.083 %) nebulizer solution, Take 2.5 mg by nebulization every 6 (six) hours as needed for Wheezing. Rescue, Disp: , Rfl:     ALBUTEROL INHL, Inhale into the lungs., Disp: , Rfl:     baclofen (LIORESAL) 10 MG tablet, baclofen 10 mg tablet  TAKE 1 TABLET BY MOUTH THREE TIMES DAILY, Disp: , Rfl:     budesonide-glycopyr-formoterol (BREZTRI AEROSPHERE) 160-9-4.8 mcg/actuation HFAA, Inhale 2 puffs into the lungs 2 (two) times a day., Disp: 10.7 g, Rfl: 5    cyanocobalamin 1,000 mcg/mL injection, Inject 1 mL (1,000 mcg total) into the skin every 30 days., Disp: 3 mL, Rfl: 4    ELIQUIS 5 mg Tab, TAKE ONE TABLET BY MOUTH TWO TIMES A DAY, Disp: 60 tablet, Rfl: 7    ergocalciferol (ERGOCALCIFEROL) 50,000 unit Cap, Take 50,000 Units by mouth every 7 days., Disp: , Rfl:     levothyroxine (SYNTHROID) 112 MCG tablet, levothyroxine 112 mcg tablet  Take 1 tablet every day by oral route., Disp: , Rfl:     nystatin (MYCOSTATIN) 100,000 unit/mL suspension, nystatin 100,000 unit/mL oral suspension, Disp: , Rfl:     pomegranate xt/pomegranat seed (POMEGRANATE ORAL), Take by mouth., Disp: , Rfl:     pravastatin (PRAVACHOL) 40 MG tablet, pravastatin 40 mg tablet  Take 1 tablet every day by oral route for 90 days., Disp: , Rfl:     prenatal 105-iron-folic ac-dha 30 mg iron- 1.4 mg-300 mg Cmpk, Take by mouth., Disp: , Rfl:     syringe with needle, safety 3 mL 25 gauge x 5/8" Syrg, 1 Syringe by Misc.(Non-Drug; Combo Route) route every 30 days., Disp: 3 each, Rfl: 4    TRELEGY ELLIPTA 100-62.5-25 mcg DsDv, INHALE ONE PUFF INTO " THE LUNGS ONCE DAILY, Disp: 60 each, Rfl: 5    Allergies    Review of patient's allergies indicates:   Allergen Reactions    Zosyn [piperacillin-tazobactam] Swelling     Swelling to lips       SocHx    Social History     Tobacco Use   Smoking Status Every Day    Packs/day: 0.25    Years: 40.00    Pack years: 10.00    Types: Cigarettes   Smokeless Tobacco Never   Tobacco Comments    currently smoking 1/2 PPD       Social History     Substance and Sexual Activity   Alcohol Use No       Drug Use - no  Occupation - retired   Asbestos exposure - no  Pets - na    FMHx    Family History   Problem Relation Age of Onset    Breast cancer Maternal Aunt     Heart disease Father     Ovarian cancer Sister     Lung cancer Brother          Review of Systems  Review of Systems   Constitutional:  Positive for malaise/fatigue. Negative for chills, diaphoresis, fever and weight loss.   HENT:  Negative for congestion, ear discharge, ear pain, hearing loss, nosebleeds, sinus pain, sore throat and tinnitus.    Eyes:  Negative for pain.   Respiratory:  Positive for cough, shortness of breath and wheezing. Negative for hemoptysis, sputum production and stridor.    Cardiovascular:  Positive for chest pain (intermittent sharp pain) and leg swelling (later in day). Negative for palpitations, orthopnea, claudication and PND.   Gastrointestinal:  Negative for abdominal pain, blood in stool, constipation, diarrhea, heartburn, melena, nausea and vomiting.   Genitourinary:  Negative for dysuria, frequency, hematuria and urgency.   Musculoskeletal:  Positive for back pain. Negative for falls and myalgias.   Neurological:  Positive for weakness. Negative for dizziness, tingling, tremors, sensory change, speech change, focal weakness, seizures, loss of consciousness and headaches.   Psychiatric/Behavioral:  Negative for depression, substance abuse and suicidal ideas. The patient is not nervous/anxious.      Physical Exam    Vitals:     08/31/22 1022   BP: 125/80   BP Location: Left arm   Patient Position: Sitting   BP Method: Medium (Manual)   Pulse: 84   SpO2: 97%   Weight: 99.3 kg (219 lb)       Physical Exam  Vitals and nursing note reviewed.   Constitutional:       General: She is not in acute distress.     Appearance: Normal appearance. She is well-developed. She is not ill-appearing, toxic-appearing or diaphoretic.   HENT:      Head: Normocephalic and atraumatic.      Right Ear: External ear normal.      Left Ear: External ear normal.      Nose: Nose normal.   Eyes:      General: No scleral icterus.        Right eye: No discharge.         Left eye: No discharge.      Conjunctiva/sclera: Conjunctivae normal.      Pupils: Pupils are equal, round, and reactive to light.   Neck:      Thyroid: No thyromegaly.      Vascular: No carotid bruit or JVD.      Trachea: No tracheal deviation.   Cardiovascular:      Rate and Rhythm: Regular rhythm. Tachycardia present.      Pulses: Normal pulses.      Heart sounds: Normal heart sounds. No murmur heard.    No friction rub. No gallop.   Pulmonary:      Effort: Pulmonary effort is normal. No respiratory distress.      Breath sounds: No stridor. Wheezing (few expiratory wheezes) present. No rhonchi or rales.      Comments: Decreased BS throughout  Chest:      Chest wall: No tenderness.   Abdominal:      General: Bowel sounds are normal. There is no distension.      Palpations: Abdomen is soft.      Tenderness: There is no abdominal tenderness. There is no guarding.   Musculoskeletal:         General: No tenderness. Normal range of motion.      Cervical back: Normal range of motion and neck supple. No rigidity or tenderness.      Right lower leg: No edema.      Left lower leg: No edema.   Lymphadenopathy:      Cervical: No cervical adenopathy.   Skin:     General: Skin is warm and dry.   Neurological:      General: No focal deficit present.      Mental Status: She is alert and oriented to person, place, and  time. Mental status is at baseline.      Cranial Nerves: No cranial nerve deficit.   Psychiatric:         Mood and Affect: Mood normal.         Behavior: Behavior normal.         Thought Content: Thought content normal.         Judgment: Judgment normal.       Labs    Lab Results   Component Value Date    WBC 11.55 06/30/2022    HGB 12.5 06/30/2022    HCT 40.1 06/30/2022     06/30/2022       Sodium   Date Value Ref Range Status   06/30/2022 138 136 - 145 mmol/L Final   04/11/2019 136 134 - 144 mmol/L      Potassium   Date Value Ref Range Status   06/30/2022 4.0 3.5 - 5.1 mmol/L Final     Chloride   Date Value Ref Range Status   06/30/2022 101 95 - 110 mmol/L Final   04/11/2019 97 (L) 98 - 110 mmol/L      CO2   Date Value Ref Range Status   06/30/2022 28 23 - 29 mmol/L Final     Glucose   Date Value Ref Range Status   06/30/2022 97 70 - 110 mg/dL Final   04/11/2019 87 70 - 99 mg/dL      BUN   Date Value Ref Range Status   06/30/2022 10 8 - 23 mg/dL Final     Creatinine   Date Value Ref Range Status   06/30/2022 0.8 0.5 - 1.4 mg/dL Final   04/11/2019 0.86 0.60 - 1.40 mg/dL      Calcium   Date Value Ref Range Status   06/30/2022 9.8 8.7 - 10.5 mg/dL Final     Total Protein   Date Value Ref Range Status   06/30/2022 7.9 6.0 - 8.4 g/dL Final     Albumin   Date Value Ref Range Status   06/30/2022 4.1 3.5 - 5.2 g/dL Final   04/11/2019 4.4 3.1 - 4.7 g/dL      Total Bilirubin   Date Value Ref Range Status   06/30/2022 0.1 0.1 - 1.0 mg/dL Final     Comment:     For infants and newborns, interpretation of results should be based  on gestational age, weight and in agreement with clinical  observations.    Premature Infant recommended reference ranges:  Up to 24 hours.............<8.0 mg/dL  Up to 48 hours............<12.0 mg/dL  3-5 days..................<15.0 mg/dL  6-29 days.................<15.0 mg/dL       Alkaline Phosphatase   Date Value Ref Range Status   06/30/2022 56 55 - 135 U/L Final     AST   Date Value Ref  "Range Status   06/30/2022 29 10 - 40 U/L Final     ALT   Date Value Ref Range Status   06/30/2022 20 10 - 44 U/L Final     Anion Gap   Date Value Ref Range Status   06/30/2022 9 8 - 16 mmol/L Final       Xrays        Impression/Plan    Problem List Items Addressed This Visit          Pulmonary    COPD (chronic obstructive pulmonary disease)     Continue present medications but will give a trial of BREZTRI  Will refill medications as needed.  Instructed patient to contact us with any issues concerning their medications (cost, reactions, etc.).  Have discussed with patient about inciting conditions which may exacerbate their disease.  We did discuss possible new therapies or de-escalation of therapy (if appropriate).  Asked patient if they were interested in pursuing pulmonary rehabilitation.  All questions answered  RTC 3 months  Patient instructed that they are to call if symptoms change or new issues develop prior to their next visit.           Hypoxemia     Continue with home O2            Other    Anti-cardiolipin antibody positive     Aware          Personal history of tobacco use, presenting hazards to health     Currently smoking 1/2  packs per day> 50 pack years  I have counseled pt for 3-5 minutes regarding cigarette cessation.  This has included the need to stop smoking as well as strategies, including but not limited to "cold turkey", CHANTIX (including risks and benefits of the drug), nicotine replacement and WELLBUTRIN.           Relevant Orders    CT Chest Lung Screening Low Dose           Alexys Jackson MD            "

## 2022-08-31 NOTE — ASSESSMENT & PLAN NOTE
 Continue present medications but will give a trial of BREZTRI   Will refill medications as needed.   Instructed patient to contact us with any issues concerning their medications (cost, reactions, etc.).   Have discussed with patient about inciting conditions which may exacerbate their disease.   We did discuss possible new therapies or de-escalation of therapy (if appropriate).   Asked patient if they were interested in pursuing pulmonary rehabilitation.   All questions answered   RTC 3 months   Patient instructed that they are to call if symptoms change or new issues develop prior to their next visit.

## 2022-08-31 NOTE — ASSESSMENT & PLAN NOTE
" Currently smoking 1/2  packs per day> 50 pack years   I have counseled pt for 3-5 minutes regarding cigarette cessation.  This has included the need to stop smoking as well as strategies, including but not limited to "cold turkey", CHANTIX (including risks and benefits of the drug), nicotine replacement and WELLBUTRIN.    "

## 2022-10-19 ENCOUNTER — LAB VISIT (OUTPATIENT)
Dept: LAB | Facility: HOSPITAL | Age: 67
End: 2022-10-19
Attending: INTERNAL MEDICINE
Payer: MEDICARE

## 2022-10-19 DIAGNOSIS — E53.8 B12 DEFICIENCY: ICD-10-CM

## 2022-10-19 DIAGNOSIS — D50.0 IRON DEFICIENCY ANEMIA DUE TO CHRONIC BLOOD LOSS: ICD-10-CM

## 2022-10-19 LAB
ALBUMIN SERPL BCP-MCNC: 4.3 G/DL (ref 3.5–5.2)
ALP SERPL-CCNC: 54 U/L (ref 55–135)
ALT SERPL W/O P-5'-P-CCNC: 20 U/L (ref 10–44)
ANION GAP SERPL CALC-SCNC: 10 MMOL/L (ref 8–16)
AST SERPL-CCNC: 24 U/L (ref 10–40)
BASOPHILS # BLD AUTO: 0.06 K/UL (ref 0–0.2)
BASOPHILS NFR BLD: 0.6 % (ref 0–1.9)
BILIRUB SERPL-MCNC: 0.7 MG/DL (ref 0.1–1)
BUN SERPL-MCNC: 11 MG/DL (ref 8–23)
CALCIUM SERPL-MCNC: 9.6 MG/DL (ref 8.7–10.5)
CHLORIDE SERPL-SCNC: 101 MMOL/L (ref 95–110)
CO2 SERPL-SCNC: 25 MMOL/L (ref 23–29)
CREAT SERPL-MCNC: 0.9 MG/DL (ref 0.5–1.4)
DIFFERENTIAL METHOD: ABNORMAL
EOSINOPHIL # BLD AUTO: 0.3 K/UL (ref 0–0.5)
EOSINOPHIL NFR BLD: 2.5 % (ref 0–8)
ERYTHROCYTE [DISTWIDTH] IN BLOOD BY AUTOMATED COUNT: 15 % (ref 11.5–14.5)
EST. GFR  (NO RACE VARIABLE): >60 ML/MIN/1.73 M^2
FERRITIN SERPL-MCNC: 16 NG/ML (ref 20–300)
GLUCOSE SERPL-MCNC: 97 MG/DL (ref 70–110)
HCT VFR BLD AUTO: 44.8 % (ref 37–48.5)
HGB BLD-MCNC: 14.5 G/DL (ref 12–16)
IMM GRANULOCYTES # BLD AUTO: 0.03 K/UL (ref 0–0.04)
IMM GRANULOCYTES NFR BLD AUTO: 0.3 % (ref 0–0.5)
LYMPHOCYTES # BLD AUTO: 3 K/UL (ref 1–4.8)
LYMPHOCYTES NFR BLD: 28.2 % (ref 18–48)
MCH RBC QN AUTO: 28.9 PG (ref 27–31)
MCHC RBC AUTO-ENTMCNC: 32.4 G/DL (ref 32–36)
MCV RBC AUTO: 89 FL (ref 82–98)
MONOCYTES # BLD AUTO: 0.9 K/UL (ref 0.3–1)
MONOCYTES NFR BLD: 8.2 % (ref 4–15)
NEUTROPHILS # BLD AUTO: 6.4 K/UL (ref 1.8–7.7)
NEUTROPHILS NFR BLD: 60.2 % (ref 38–73)
NRBC BLD-RTO: 0 /100 WBC
PLATELET # BLD AUTO: 345 K/UL (ref 150–450)
PMV BLD AUTO: 9.5 FL (ref 9.2–12.9)
POTASSIUM SERPL-SCNC: 4.2 MMOL/L (ref 3.5–5.1)
PROT SERPL-MCNC: 8.1 G/DL (ref 6–8.4)
RBC # BLD AUTO: 5.02 M/UL (ref 4–5.4)
SODIUM SERPL-SCNC: 136 MMOL/L (ref 136–145)
VIT B12 SERPL-MCNC: 446 PG/ML (ref 210–950)
WBC # BLD AUTO: 10.62 K/UL (ref 3.9–12.7)

## 2022-10-19 PROCEDURE — 36415 COLL VENOUS BLD VENIPUNCTURE: CPT | Performed by: INTERNAL MEDICINE

## 2022-10-19 PROCEDURE — 82607 VITAMIN B-12: CPT | Performed by: INTERNAL MEDICINE

## 2022-10-19 PROCEDURE — 82728 ASSAY OF FERRITIN: CPT | Performed by: INTERNAL MEDICINE

## 2022-10-19 PROCEDURE — 80053 COMPREHEN METABOLIC PANEL: CPT | Performed by: INTERNAL MEDICINE

## 2022-10-19 PROCEDURE — 85025 COMPLETE CBC W/AUTO DIFF WBC: CPT | Performed by: INTERNAL MEDICINE

## 2022-10-26 ENCOUNTER — TELEPHONE (OUTPATIENT)
Dept: HEMATOLOGY/ONCOLOGY | Facility: CLINIC | Age: 67
End: 2022-10-26

## 2022-10-26 ENCOUNTER — OFFICE VISIT (OUTPATIENT)
Dept: HEMATOLOGY/ONCOLOGY | Facility: CLINIC | Age: 67
End: 2022-10-26
Payer: MEDICARE

## 2022-10-26 VITALS
SYSTOLIC BLOOD PRESSURE: 109 MMHG | HEART RATE: 76 BPM | HEIGHT: 64 IN | WEIGHT: 226 LBS | DIASTOLIC BLOOD PRESSURE: 72 MMHG | RESPIRATION RATE: 22 BRPM | TEMPERATURE: 98 F | BODY MASS INDEX: 38.58 KG/M2

## 2022-10-26 DIAGNOSIS — E53.8 B12 DEFICIENCY: ICD-10-CM

## 2022-10-26 DIAGNOSIS — D50.0 IRON DEFICIENCY ANEMIA DUE TO CHRONIC BLOOD LOSS: Primary | ICD-10-CM

## 2022-10-26 PROCEDURE — 99214 OFFICE O/P EST MOD 30 MIN: CPT | Mod: S$GLB,,, | Performed by: INTERNAL MEDICINE

## 2022-10-26 PROCEDURE — 99214 PR OFFICE/OUTPT VISIT, EST, LEVL IV, 30-39 MIN: ICD-10-PCS | Mod: S$GLB,,, | Performed by: INTERNAL MEDICINE

## 2022-10-26 RX ORDER — METHYLPREDNISOLONE SOD SUCC 125 MG
125 VIAL (EA) INJECTION ONCE AS NEEDED
Status: CANCELLED | OUTPATIENT
Start: 2022-11-09

## 2022-10-26 RX ORDER — SODIUM CHLORIDE 0.9 % (FLUSH) 0.9 %
10 SYRINGE (ML) INJECTION
Status: CANCELLED | OUTPATIENT
Start: 2022-11-09

## 2022-10-26 RX ORDER — EPINEPHRINE 0.3 MG/.3ML
0.3 INJECTION SUBCUTANEOUS ONCE AS NEEDED
Status: CANCELLED | OUTPATIENT
Start: 2022-11-09

## 2022-10-26 RX ORDER — DIPHENHYDRAMINE HYDROCHLORIDE 50 MG/ML
50 INJECTION INTRAMUSCULAR; INTRAVENOUS ONCE AS NEEDED
Status: CANCELLED | OUTPATIENT
Start: 2022-11-09

## 2022-10-26 RX ORDER — HEPARIN 100 UNIT/ML
500 SYRINGE INTRAVENOUS
Status: CANCELLED | OUTPATIENT
Start: 2022-11-09

## 2022-10-27 NOTE — PROGRESS NOTES
[facility] hematology Oncology in office subsequent encounter note  10/26/22  Gabriel, BRENDA, Hazel Tavera        Presented with LUQ pain.  Had splenic artery thrombus with splenic infarct.  On Eliquis 5mg BID.  No LUQ pain sx now.    Coagulation evaluation increased Factor 8 and increased Anti cardiolipid IgM antibody levels.  Persistently increased on repeat determination.    Estimate 30% risk of clot event over next 2-3 years.    Continue Eliquis 5mg BID therapeutic and prophylaxis.    She admits to blurred vision and dizzyness intermittently, sx predated the splenic infarct and have not changed while on Eliquis.    History of COPD.  On 02 24/7.    Re check her CAT of abdomen  regards status of splenic infarct showed resolution of splenic infarct area. 2019.    Continue Eliquis BID.      She is seen nurse practitioner Gabriel for general care and for thyroid management.     She saw her cardiologist, Dr. Oliva, due for chemical stress test.    Her son had COVID, but was asymptomatic.  She did not have symptoms and isolated herself.    She has had 2 of 2 vaccines and the booster shot and the flu shot.  She received the pneumonia vaccination 2 years ago.  She has not taken the shingles vaccine.    She has COPD and is on oxygen 2 liters/minute 24/7.     No acute distress, does not appear chronically ill.  She does not have palpable lymphadenopathy.  Her lungs are clear bilaterally.  She has regular rhythm without murmur.  Abdomen is flat without distention, abdomen is nontender without palpable hepatomegaly or splenomegaly or abdominal mass.  Calves are nontender without edema, no petechiae or purpura.  Neurologically grossly intact    History of splenic infarct with  Increased factor 8 at 250 and increased anticardiolipin antibody at 13.  Now.    Continue Eliquis 5 mg b.i.d..  Return to clinic in 6 months.    H/H 6/20  Ferritin 5  B 12 69    Gave Ferrlicet x's8 weeks  B 12 weekly x's 4 then monthly.  Stool for  occult blood.    Hgb 7 to 12 to 14.5  Ferritin 16 now.  B 12 now 446, continue B 12 monthly.    Give Ferrlicet weekly x's 4 weeks.  Stool for heme.    CBC ferritin 1/2023  RTC 1/2023.

## 2022-10-27 NOTE — TELEPHONE ENCOUNTER
Orders placed in Epic for ferrlicit weekly x's 4 weeks.    Get auth    Get date and time    Check lab 1/2023    See me end of 1/2023

## 2022-11-30 ENCOUNTER — LAB VISIT (OUTPATIENT)
Dept: LAB | Facility: HOSPITAL | Age: 67
End: 2022-11-30
Attending: INTERNAL MEDICINE
Payer: MEDICARE

## 2022-11-30 DIAGNOSIS — D50.0 IRON DEFICIENCY ANEMIA DUE TO CHRONIC BLOOD LOSS: ICD-10-CM

## 2022-11-30 LAB
OB PNL STL: NEGATIVE

## 2022-11-30 PROCEDURE — 82272 OCCULT BLD FECES 1-3 TESTS: CPT | Mod: 91 | Performed by: INTERNAL MEDICINE

## 2022-12-01 ENCOUNTER — INFUSION (OUTPATIENT)
Dept: INFUSION THERAPY | Facility: HOSPITAL | Age: 67
End: 2022-12-01
Attending: INTERNAL MEDICINE
Payer: MEDICARE

## 2022-12-01 VITALS
WEIGHT: 224.63 LBS | OXYGEN SATURATION: 98 % | TEMPERATURE: 98 F | HEIGHT: 64 IN | HEART RATE: 68 BPM | SYSTOLIC BLOOD PRESSURE: 159 MMHG | RESPIRATION RATE: 17 BRPM | BODY MASS INDEX: 38.35 KG/M2 | DIASTOLIC BLOOD PRESSURE: 77 MMHG

## 2022-12-01 DIAGNOSIS — E53.8 B12 DEFICIENCY: Primary | ICD-10-CM

## 2022-12-01 DIAGNOSIS — D50.0 IRON DEFICIENCY ANEMIA DUE TO CHRONIC BLOOD LOSS: ICD-10-CM

## 2022-12-01 PROCEDURE — 96365 THER/PROPH/DIAG IV INF INIT: CPT

## 2022-12-01 PROCEDURE — 63600175 PHARM REV CODE 636 W HCPCS: Performed by: INTERNAL MEDICINE

## 2022-12-01 PROCEDURE — 25000003 PHARM REV CODE 250: Performed by: INTERNAL MEDICINE

## 2022-12-01 RX ORDER — SODIUM CHLORIDE 0.9 % (FLUSH) 0.9 %
10 SYRINGE (ML) INJECTION
Status: DISCONTINUED | OUTPATIENT
Start: 2022-12-01 | End: 2022-12-01 | Stop reason: HOSPADM

## 2022-12-01 RX ORDER — DIPHENHYDRAMINE HYDROCHLORIDE 50 MG/ML
50 INJECTION INTRAMUSCULAR; INTRAVENOUS ONCE AS NEEDED
Status: CANCELLED | OUTPATIENT
Start: 2022-12-08

## 2022-12-01 RX ORDER — HEPARIN 100 UNIT/ML
500 SYRINGE INTRAVENOUS
Status: CANCELLED | OUTPATIENT
Start: 2022-12-08

## 2022-12-01 RX ORDER — METHYLPREDNISOLONE SOD SUCC 125 MG
125 VIAL (EA) INJECTION ONCE AS NEEDED
Status: CANCELLED | OUTPATIENT
Start: 2022-12-08

## 2022-12-01 RX ORDER — EPINEPHRINE 0.3 MG/.3ML
0.3 INJECTION SUBCUTANEOUS ONCE AS NEEDED
Status: CANCELLED | OUTPATIENT
Start: 2022-12-08

## 2022-12-01 RX ORDER — SODIUM CHLORIDE 0.9 % (FLUSH) 0.9 %
10 SYRINGE (ML) INJECTION
Status: CANCELLED | OUTPATIENT
Start: 2022-12-08

## 2022-12-01 RX ADMIN — SODIUM CHLORIDE 125 MG: 9 INJECTION, SOLUTION INTRAVENOUS at 10:12

## 2022-12-01 RX ADMIN — SODIUM CHLORIDE: 9 INJECTION, SOLUTION INTRAVENOUS at 10:12

## 2022-12-01 NOTE — PLAN OF CARE
Problem: Anemia  Goal: Anemia Symptom Improvement  Outcome: Ongoing, Progressing  Intervention: Monitor and Manage Anemia  Flowsheets (Taken 12/1/2022 1031)  Fatigue Management:   activity schedule adjusted   frequent rest breaks encouraged   paced activity encouraged   fatigue-related activity identified   Patient had complaints of L wrist pain. Some discoloration and slight swelling. Patient has history of blood clots and is on eliquis. Notified nurse practioners with Dr. Mayer office. Patient agreed to follow up with her PCP.

## 2022-12-08 ENCOUNTER — INFUSION (OUTPATIENT)
Dept: INFUSION THERAPY | Facility: HOSPITAL | Age: 67
End: 2022-12-08
Attending: INTERNAL MEDICINE
Payer: MEDICARE

## 2022-12-08 VITALS
DIASTOLIC BLOOD PRESSURE: 76 MMHG | WEIGHT: 222.5 LBS | SYSTOLIC BLOOD PRESSURE: 136 MMHG | HEART RATE: 69 BPM | BODY MASS INDEX: 39.42 KG/M2 | OXYGEN SATURATION: 95 % | RESPIRATION RATE: 17 BRPM | HEIGHT: 63 IN | TEMPERATURE: 98 F

## 2022-12-08 DIAGNOSIS — D50.0 IRON DEFICIENCY ANEMIA DUE TO CHRONIC BLOOD LOSS: ICD-10-CM

## 2022-12-08 DIAGNOSIS — E53.8 B12 DEFICIENCY: Primary | ICD-10-CM

## 2022-12-08 PROCEDURE — 25000003 PHARM REV CODE 250: Performed by: INTERNAL MEDICINE

## 2022-12-08 PROCEDURE — 63600175 PHARM REV CODE 636 W HCPCS: Performed by: INTERNAL MEDICINE

## 2022-12-08 PROCEDURE — 96365 THER/PROPH/DIAG IV INF INIT: CPT

## 2022-12-08 RX ORDER — HEPARIN 100 UNIT/ML
500 SYRINGE INTRAVENOUS
Status: CANCELLED | OUTPATIENT
Start: 2022-12-15

## 2022-12-08 RX ORDER — SODIUM CHLORIDE 0.9 % (FLUSH) 0.9 %
10 SYRINGE (ML) INJECTION
Status: DISCONTINUED | OUTPATIENT
Start: 2022-12-08 | End: 2022-12-08 | Stop reason: HOSPADM

## 2022-12-08 RX ORDER — SODIUM CHLORIDE 0.9 % (FLUSH) 0.9 %
10 SYRINGE (ML) INJECTION
Status: CANCELLED | OUTPATIENT
Start: 2022-12-15

## 2022-12-08 RX ORDER — EPINEPHRINE 0.3 MG/.3ML
0.3 INJECTION SUBCUTANEOUS ONCE AS NEEDED
Status: CANCELLED | OUTPATIENT
Start: 2022-12-15

## 2022-12-08 RX ORDER — DIPHENHYDRAMINE HYDROCHLORIDE 50 MG/ML
50 INJECTION INTRAMUSCULAR; INTRAVENOUS ONCE AS NEEDED
Status: CANCELLED | OUTPATIENT
Start: 2022-12-15

## 2022-12-08 RX ORDER — METHYLPREDNISOLONE SOD SUCC 125 MG
125 VIAL (EA) INJECTION ONCE AS NEEDED
Status: CANCELLED | OUTPATIENT
Start: 2022-12-15

## 2022-12-08 RX ADMIN — SODIUM CHLORIDE 125 MG: 9 INJECTION, SOLUTION INTRAVENOUS at 09:12

## 2022-12-08 NOTE — PLAN OF CARE
Problem: Fatigue  Goal: Improved Activity Tolerance  Intervention: Promote Improved Energy  Flowsheets (Taken 12/8/2022 1114)  Fatigue Management: fatigue-related activity identified  Activity Management: Ambulated -L4

## 2022-12-12 DIAGNOSIS — M79.645 PAIN IN FINGER OF LEFT HAND: Primary | ICD-10-CM

## 2022-12-15 ENCOUNTER — INFUSION (OUTPATIENT)
Dept: INFUSION THERAPY | Facility: HOSPITAL | Age: 67
End: 2022-12-15
Attending: INTERNAL MEDICINE
Payer: MEDICARE

## 2022-12-15 VITALS
DIASTOLIC BLOOD PRESSURE: 84 MMHG | RESPIRATION RATE: 18 BRPM | BODY MASS INDEX: 38.44 KG/M2 | TEMPERATURE: 98 F | WEIGHT: 225.19 LBS | OXYGEN SATURATION: 96 % | HEART RATE: 72 BPM | HEIGHT: 64 IN | SYSTOLIC BLOOD PRESSURE: 122 MMHG

## 2022-12-15 DIAGNOSIS — D50.0 IRON DEFICIENCY ANEMIA DUE TO CHRONIC BLOOD LOSS: ICD-10-CM

## 2022-12-15 DIAGNOSIS — E53.8 B12 DEFICIENCY: Primary | ICD-10-CM

## 2022-12-15 PROCEDURE — 25000003 PHARM REV CODE 250: Performed by: INTERNAL MEDICINE

## 2022-12-15 PROCEDURE — 96365 THER/PROPH/DIAG IV INF INIT: CPT

## 2022-12-15 PROCEDURE — 63600175 PHARM REV CODE 636 W HCPCS: Performed by: INTERNAL MEDICINE

## 2022-12-15 RX ORDER — EPINEPHRINE 0.3 MG/.3ML
0.3 INJECTION SUBCUTANEOUS ONCE AS NEEDED
Status: CANCELLED | OUTPATIENT
Start: 2022-12-22

## 2022-12-15 RX ORDER — METHYLPREDNISOLONE SOD SUCC 125 MG
125 VIAL (EA) INJECTION ONCE AS NEEDED
Status: CANCELLED | OUTPATIENT
Start: 2022-12-22

## 2022-12-15 RX ORDER — DIPHENHYDRAMINE HYDROCHLORIDE 50 MG/ML
50 INJECTION INTRAMUSCULAR; INTRAVENOUS ONCE AS NEEDED
Status: CANCELLED | OUTPATIENT
Start: 2022-12-22

## 2022-12-15 RX ORDER — SODIUM CHLORIDE 0.9 % (FLUSH) 0.9 %
10 SYRINGE (ML) INJECTION
Status: DISCONTINUED | OUTPATIENT
Start: 2022-12-15 | End: 2022-12-15 | Stop reason: HOSPADM

## 2022-12-15 RX ORDER — SODIUM CHLORIDE 0.9 % (FLUSH) 0.9 %
10 SYRINGE (ML) INJECTION
Status: CANCELLED | OUTPATIENT
Start: 2022-12-22

## 2022-12-15 RX ORDER — HEPARIN 100 UNIT/ML
500 SYRINGE INTRAVENOUS
Status: CANCELLED | OUTPATIENT
Start: 2022-12-22

## 2022-12-15 RX ADMIN — SODIUM CHLORIDE: 0.9 INJECTION, SOLUTION INTRAVENOUS at 11:12

## 2022-12-15 RX ADMIN — SODIUM CHLORIDE 125 MG: 9 INJECTION, SOLUTION INTRAVENOUS at 11:12

## 2022-12-15 NOTE — PLAN OF CARE
Problem: Fatigue  Goal: Improved Activity Tolerance  Outcome: Ongoing, Progressing  Intervention: Promote Improved Energy  Flowsheets (Taken 12/15/2022 1317)  Fatigue Management: frequent rest breaks encouraged

## 2022-12-20 ENCOUNTER — TELEPHONE (OUTPATIENT)
Dept: HEMATOLOGY/ONCOLOGY | Facility: CLINIC | Age: 67
End: 2022-12-20

## 2022-12-20 NOTE — TELEPHONE ENCOUNTER
Tell patient the stool studies were negative for blood.      Did she complete her recent  iron infusions? .  Keep the appointment to see me towards the end of January and get her CBC, ferritin and B12 levels just before the appointment.

## 2023-01-12 ENCOUNTER — INFUSION (OUTPATIENT)
Dept: INFUSION THERAPY | Facility: HOSPITAL | Age: 68
End: 2023-01-12
Attending: INTERNAL MEDICINE
Payer: MEDICARE

## 2023-01-12 VITALS
RESPIRATION RATE: 18 BRPM | BODY MASS INDEX: 38.04 KG/M2 | DIASTOLIC BLOOD PRESSURE: 68 MMHG | WEIGHT: 222.81 LBS | HEIGHT: 64 IN | HEART RATE: 71 BPM | OXYGEN SATURATION: 97 % | SYSTOLIC BLOOD PRESSURE: 114 MMHG | TEMPERATURE: 98 F

## 2023-01-12 DIAGNOSIS — E53.8 B12 DEFICIENCY: Primary | ICD-10-CM

## 2023-01-12 DIAGNOSIS — D50.0 IRON DEFICIENCY ANEMIA DUE TO CHRONIC BLOOD LOSS: ICD-10-CM

## 2023-01-12 PROCEDURE — 25000003 PHARM REV CODE 250: Performed by: INTERNAL MEDICINE

## 2023-01-12 PROCEDURE — 63600175 PHARM REV CODE 636 W HCPCS: Performed by: INTERNAL MEDICINE

## 2023-01-12 PROCEDURE — 96365 THER/PROPH/DIAG IV INF INIT: CPT

## 2023-01-12 RX ORDER — SODIUM CHLORIDE 0.9 % (FLUSH) 0.9 %
10 SYRINGE (ML) INJECTION
Status: CANCELLED | OUTPATIENT
Start: 2023-01-19

## 2023-01-12 RX ORDER — HEPARIN 100 UNIT/ML
500 SYRINGE INTRAVENOUS
Status: CANCELLED | OUTPATIENT
Start: 2023-01-19

## 2023-01-12 RX ORDER — METHYLPREDNISOLONE SOD SUCC 125 MG
125 VIAL (EA) INJECTION ONCE AS NEEDED
Status: CANCELLED | OUTPATIENT
Start: 2023-01-19

## 2023-01-12 RX ORDER — SODIUM CHLORIDE 0.9 % (FLUSH) 0.9 %
10 SYRINGE (ML) INJECTION
Status: DISCONTINUED | OUTPATIENT
Start: 2023-01-12 | End: 2023-01-12 | Stop reason: HOSPADM

## 2023-01-12 RX ORDER — EPINEPHRINE 0.3 MG/.3ML
0.3 INJECTION SUBCUTANEOUS ONCE AS NEEDED
Status: CANCELLED | OUTPATIENT
Start: 2023-01-19

## 2023-01-12 RX ORDER — DIPHENHYDRAMINE HYDROCHLORIDE 50 MG/ML
50 INJECTION INTRAMUSCULAR; INTRAVENOUS ONCE AS NEEDED
Status: CANCELLED | OUTPATIENT
Start: 2023-01-19

## 2023-01-12 RX ADMIN — SODIUM CHLORIDE 125 MG: 9 INJECTION, SOLUTION INTRAVENOUS at 01:01

## 2023-01-18 ENCOUNTER — LAB VISIT (OUTPATIENT)
Dept: LAB | Facility: HOSPITAL | Age: 68
End: 2023-01-18
Attending: INTERNAL MEDICINE
Payer: MEDICARE

## 2023-01-18 DIAGNOSIS — D50.0 IRON DEFICIENCY ANEMIA DUE TO CHRONIC BLOOD LOSS: ICD-10-CM

## 2023-01-18 DIAGNOSIS — E53.8 B12 DEFICIENCY: ICD-10-CM

## 2023-01-18 LAB
ALBUMIN SERPL BCP-MCNC: 4 G/DL (ref 3.5–5.2)
ALP SERPL-CCNC: 50 U/L (ref 55–135)
ALT SERPL W/O P-5'-P-CCNC: 17 U/L (ref 10–44)
ANION GAP SERPL CALC-SCNC: 9 MMOL/L (ref 8–16)
AST SERPL-CCNC: 23 U/L (ref 10–40)
BASOPHILS # BLD AUTO: 0.08 K/UL (ref 0–0.2)
BASOPHILS NFR BLD: 1 % (ref 0–1.9)
BILIRUB SERPL-MCNC: 0.1 MG/DL (ref 0.1–1)
BUN SERPL-MCNC: 12 MG/DL (ref 8–23)
CALCIUM SERPL-MCNC: 9.2 MG/DL (ref 8.7–10.5)
CHLORIDE SERPL-SCNC: 105 MMOL/L (ref 95–110)
CO2 SERPL-SCNC: 23 MMOL/L (ref 23–29)
CREAT SERPL-MCNC: 0.9 MG/DL (ref 0.5–1.4)
DIFFERENTIAL METHOD: ABNORMAL
EOSINOPHIL # BLD AUTO: 0.4 K/UL (ref 0–0.5)
EOSINOPHIL NFR BLD: 4.9 % (ref 0–8)
ERYTHROCYTE [DISTWIDTH] IN BLOOD BY AUTOMATED COUNT: 15.6 % (ref 11.5–14.5)
EST. GFR  (NO RACE VARIABLE): >60 ML/MIN/1.73 M^2
FERRITIN SERPL-MCNC: 108 NG/ML (ref 20–300)
GLUCOSE SERPL-MCNC: 99 MG/DL (ref 70–110)
HCT VFR BLD AUTO: 43.9 % (ref 37–48.5)
HGB BLD-MCNC: 13.9 G/DL (ref 12–16)
IMM GRANULOCYTES # BLD AUTO: 0.03 K/UL (ref 0–0.04)
IMM GRANULOCYTES NFR BLD AUTO: 0.4 % (ref 0–0.5)
LYMPHOCYTES # BLD AUTO: 2.6 K/UL (ref 1–4.8)
LYMPHOCYTES NFR BLD: 31 % (ref 18–48)
MCH RBC QN AUTO: 29.3 PG (ref 27–31)
MCHC RBC AUTO-ENTMCNC: 31.7 G/DL (ref 32–36)
MCV RBC AUTO: 93 FL (ref 82–98)
MONOCYTES # BLD AUTO: 1 K/UL (ref 0.3–1)
MONOCYTES NFR BLD: 11.4 % (ref 4–15)
NEUTROPHILS # BLD AUTO: 4.3 K/UL (ref 1.8–7.7)
NEUTROPHILS NFR BLD: 51.3 % (ref 38–73)
NRBC BLD-RTO: 0 /100 WBC
PLATELET # BLD AUTO: 273 K/UL (ref 150–450)
PMV BLD AUTO: 9.3 FL (ref 9.2–12.9)
POTASSIUM SERPL-SCNC: 4.1 MMOL/L (ref 3.5–5.1)
PROT SERPL-MCNC: 7.3 G/DL (ref 6–8.4)
RBC # BLD AUTO: 4.74 M/UL (ref 4–5.4)
SODIUM SERPL-SCNC: 137 MMOL/L (ref 136–145)
VIT B12 SERPL-MCNC: 551 PG/ML (ref 210–950)
WBC # BLD AUTO: 8.32 K/UL (ref 3.9–12.7)

## 2023-01-18 PROCEDURE — 85025 COMPLETE CBC W/AUTO DIFF WBC: CPT | Performed by: INTERNAL MEDICINE

## 2023-01-18 PROCEDURE — 82728 ASSAY OF FERRITIN: CPT | Performed by: INTERNAL MEDICINE

## 2023-01-18 PROCEDURE — 80053 COMPREHEN METABOLIC PANEL: CPT | Performed by: INTERNAL MEDICINE

## 2023-01-18 PROCEDURE — 36415 COLL VENOUS BLD VENIPUNCTURE: CPT | Performed by: INTERNAL MEDICINE

## 2023-01-18 PROCEDURE — 82607 VITAMIN B-12: CPT | Performed by: INTERNAL MEDICINE

## 2023-01-26 ENCOUNTER — OFFICE VISIT (OUTPATIENT)
Dept: PULMONOLOGY | Facility: CLINIC | Age: 68
End: 2023-01-26
Attending: INTERNAL MEDICINE
Payer: MEDICARE

## 2023-01-26 ENCOUNTER — OFFICE VISIT (OUTPATIENT)
Dept: HEMATOLOGY/ONCOLOGY | Facility: CLINIC | Age: 68
End: 2023-01-26
Payer: MEDICARE

## 2023-01-26 VITALS
WEIGHT: 223 LBS | BODY MASS INDEX: 38.88 KG/M2 | TEMPERATURE: 98 F | SYSTOLIC BLOOD PRESSURE: 145 MMHG | HEART RATE: 95 BPM | DIASTOLIC BLOOD PRESSURE: 63 MMHG

## 2023-01-26 VITALS
WEIGHT: 223 LBS | BODY MASS INDEX: 38.88 KG/M2 | SYSTOLIC BLOOD PRESSURE: 140 MMHG | OXYGEN SATURATION: 95 % | DIASTOLIC BLOOD PRESSURE: 80 MMHG | HEART RATE: 101 BPM

## 2023-01-26 DIAGNOSIS — D50.0 IRON DEFICIENCY ANEMIA DUE TO CHRONIC BLOOD LOSS: ICD-10-CM

## 2023-01-26 DIAGNOSIS — J44.9 CHRONIC OBSTRUCTIVE PULMONARY DISEASE, UNSPECIFIED COPD TYPE: ICD-10-CM

## 2023-01-26 DIAGNOSIS — R09.02 HYPOXEMIA: ICD-10-CM

## 2023-01-26 DIAGNOSIS — R76.0 ANTI-CARDIOLIPIN ANTIBODY POSITIVE: ICD-10-CM

## 2023-01-26 DIAGNOSIS — E53.8 B12 DEFICIENCY: Primary | ICD-10-CM

## 2023-01-26 DIAGNOSIS — Z87.891 PERSONAL HISTORY OF TOBACCO USE, PRESENTING HAZARDS TO HEALTH: ICD-10-CM

## 2023-01-26 PROCEDURE — 99214 OFFICE O/P EST MOD 30 MIN: CPT | Mod: 25,S$GLB,, | Performed by: INTERNAL MEDICINE

## 2023-01-26 PROCEDURE — 99406 PR TOBACCO USE CESSATION INTERMEDIATE 3-10 MINUTES: ICD-10-PCS | Mod: S$GLB,,, | Performed by: INTERNAL MEDICINE

## 2023-01-26 PROCEDURE — 99406 BEHAV CHNG SMOKING 3-10 MIN: CPT | Mod: S$GLB,,, | Performed by: INTERNAL MEDICINE

## 2023-01-26 PROCEDURE — 99214 OFFICE O/P EST MOD 30 MIN: CPT | Mod: S$GLB,,, | Performed by: INTERNAL MEDICINE

## 2023-01-26 PROCEDURE — 99214 PR OFFICE/OUTPT VISIT, EST, LEVL IV, 30-39 MIN: ICD-10-PCS | Mod: 25,S$GLB,, | Performed by: INTERNAL MEDICINE

## 2023-01-26 PROCEDURE — 99214 PR OFFICE/OUTPT VISIT, EST, LEVL IV, 30-39 MIN: ICD-10-PCS | Mod: S$GLB,,, | Performed by: INTERNAL MEDICINE

## 2023-01-26 RX ORDER — DICLOFENAC SODIUM 10 MG/G
GEL TOPICAL
COMMUNITY
Start: 2022-12-10

## 2023-01-26 NOTE — PROGRESS NOTES
Office Visit Note *    Patient Name: Clarita Gómez  MRN: [id number]  : 1955      Reason for visit: COPD    HPI:     3/31/2021 - 66 yo female diagnosed with COPD a few years ago (unsure of severity) currently on ICS/LABA (changed from BREO to SYMBICORT) referred here for evaluation.  Has chronic SOB, BOWERS, intermittent wheezing.  She continues to smoke, currently 1/2 PPD (smoked about 2 PPD for 40+ years).  She states that she has not been able to fully quit.  Has never had a LDSCT chest (has a brother with h/o lung cancer).  Also has a h/o anticardiolipin antibody and elevated factor VIII and has been on chronic anticoagulation (sees Dr Mayer).  ROS as below.    5/10/2021 - Here for follow up, reviewed PFT with pt (FEV1 - 30%, DLCO - 12%), walk test (showed need fpr 2 LPM) and CT scan.  We will plan to change to TRELEGY (sample given and instructed).  She got Covid vaccine (Moderna)    2021 - Here for follow up, Pt is currently stable on present medications with no recent increases in their symptoms or use of rescue medications.  Since our last visit there have been no hospitalizations or ER visits for their respiratory issues and there does not seem to be anything to suggest unrecognized exacerbations.  I have reviewed the medical regimen and re-educated the pt on the role of rescue and controlling medications.  Inhaler technique and understanding seems adequate.  The patient reports no issues with any of there medications for their COPD.  Refills will be taken care of as needed.  All questions answered.  Patient has no known corona virus exposures and has been practicing social distancing.  We have discussed the virus and precautions and all questions have been answered.  We did have a discussion about booster shots.  She had a son who had Covid back in March but she did not get it then.  Still smoking about 1/2 PPD - we discussed this.    11/15/2021 - Here for follow up, Pt is currently stable on  "present medications with no recent increases in their symptoms or use of rescue medications.  Since our last visit there have been no hospitalizations or ER visits for their respiratory issues and there does not seem to be anything to suggest unrecognized exacerbations.  I have reviewed the medical regimen and re-educated the pt on the role of rescue and controlling medications.  Inhaler technique and understanding seems adequate.  The patient reports no issues with any of there medications for their COPD.  Refills will be taken care of as needed.  All questions answered.  Doesn't feel that TRELEGY "holds her" all day and wants to try BREZTRI to see if twice daily dosing helps.  Still smoking 10 cigarettes per day (we discussed this).  Has some dysphagia and is to see her PCP.  Patient has no known corona virus exposures and has been practicing social distancing.  We have discussed the virus and precautions and all questions have been answered.  Has had Covid vaccine (Moderna and plans to get booster)    8/31/2022 - Here for follow up, she felt that she did better on BREZTRI but insurance wouldn't cover and she is back on TRELEGY  but doesn't feel that she is "covered" for the whole day.  Pt is currently stable on present medications with no recent increases in their symptoms or use of rescue medications.  Since our last visit there have been no hospitalizations or ER visits for their respiratory issues and there does not seem to be anything to suggest unrecognized exacerbations.  I have reviewed the medical regimen and re-educated the pt on the role of rescue and controlling medications.  Inhaler technique and understanding seems adequate.  The patient reports no issues with any of there medications for their COPD.  Refills will be taken care of as needed.  All questions answered.  Still smoking about 10 cigarettes per day, did try to stop but didn't tolerate it.   Dysphagia is about the same - choking on stringy " meats (we discussed this) and I have recommended that she see a GI MD.  Patient has no known corona virus exposures and has been practicing social distancing.  We have discussed the virus and precautions and all questions have been answered.  She has had Covid vaccine and 2 boosters    1/26/2023 - Here for follow up, Pt is currently stable on present medications with no recent increases in their symptoms or use of rescue medications.  Since our last visit there have been no hospitalizations or ER visits for their respiratory issues and there does not seem to be anything to suggest unrecognized exacerbations.  I have reviewed the medical regimen and re-educated the pt on the role of rescue and controlling medications.  Inhaler technique and understanding seems adequate.  The patient reports no issues with any of there medications for their COPD.  Refills will be taken care of as needed.  All questions answered.  Still about 10 cigarettes per day and I have encouraged her to continue to try and stop.  She had 2nd shingles vaccine and has significant swelling and pain in her left arm (we discussed this).  She had Covid mid December and had symptoms for about 2 weeks and was treated with paxlovid.      COPD Flowsheet    GOLD III D   Last PFT - 4/21  FEV1- 30 % DLCO - 12 %     + ICS/LABA/LAMA    + prn JAYLA    mMRC -  0 - SOB with strenuous exercise   -  1 - SOB level ground, slight hill   -  2 - SOB walk slower or stop for breath level ground   - + 3 - SOB at 100 yards or after few minutes   -  4 - SOB in house, dressing    Referral to PULMONARY REHABILITATION -  NO    Vitamin D tested - no    Testosterone checked (male) - na    Tested for alpha-1-antitrypsin - NO      Low Dose Screening CT Chest    Cigarettes - 2 PPD x 40 YEARS  Still smoking -  YES  QUIT no    Date of last LD CT - 4/21    Result - scarring - needs repeat now    I have discussed with pt about using a screening CT of the chest due to history of cigarette  smoking.  We have discussed the possible findings and possible actions as a result of these findings.  The pt would like to proceed.    Vaccination Status    Flu vaccination status - 22/23    Pneumonia vaccination status - +    Covid vaccination status - + (Moderna) and booster x 2    Tetanus/diptheria vaccination status - unsure    Shingles vaccination status - No    Home Oxygen Qualification    Qualifier - O2 sat 87 on room air  (,exertion    Date - 4/2021          O2 sat 94% on 2 LPM via nasal cannuli    DX - COPD    Face to face visit with pt concerning the need for O2 therapy, all questions answered.  I stressed the need for compliance.  Pt to call with any questions.              Past Medical History    Past Medical History:   Diagnosis Date    Anticardiolipin antibody positive     COPD (chronic obstructive pulmonary disease)     Hyperlipidemia     Hypothyroid     Personal history of tobacco use, presenting hazards to health     Splenic infarct        Past Surgical History    Past Surgical History:   Procedure Laterality Date    CHOLECYSTECTOMY      COLECTOMY      partial due to diverticulosis    HYSTERECTOMY         Medications      Current Outpatient Medications:     acetaminophen (TYLENOL) 500 MG tablet, Take 500 mg by mouth every 6 (six) hours as needed for Pain., Disp: , Rfl:     albuterol (PROVENTIL) 2.5 mg /3 mL (0.083 %) nebulizer solution, Take 2.5 mg by nebulization every 6 (six) hours as needed for Wheezing. Rescue, Disp: , Rfl:     ALBUTEROL INHL, Inhale into the lungs., Disp: , Rfl:     baclofen (LIORESAL) 10 MG tablet, baclofen 10 mg tablet  TAKE 1 TABLET BY MOUTH THREE TIMES DAILY, Disp: , Rfl:     cyanocobalamin 1,000 mcg/mL injection, Inject 1 mL (1,000 mcg total) into the skin every 30 days., Disp: 3 mL, Rfl: 4    diclofenac sodium (VOLTAREN) 1 % Gel, , Disp: , Rfl:     ELIQUIS 5 mg Tab, TAKE ONE TABLET BY MOUTH TWO TIMES A DAY, Disp: 60 tablet, Rfl: 7    ergocalciferol (ERGOCALCIFEROL)  "50,000 unit Cap, Take 50,000 Units by mouth every 7 days., Disp: , Rfl:     levothyroxine (SYNTHROID) 112 MCG tablet, levothyroxine 112 mcg tablet  Take 1 tablet every day by oral route., Disp: , Rfl:     nystatin (MYCOSTATIN) 100,000 unit/mL suspension, nystatin 100,000 unit/mL oral suspension, Disp: , Rfl:     pomegranate xt/pomegranat seed (POMEGRANATE ORAL), Take by mouth., Disp: , Rfl:     pravastatin (PRAVACHOL) 40 MG tablet, pravastatin 40 mg tablet  Take 1 tablet every day by oral route for 90 days., Disp: , Rfl:     prenatal 105-iron-folic ac-dha 30 mg iron- 1.4 mg-300 mg Cmpk, Take by mouth., Disp: , Rfl:     syringe with needle, safety 3 mL 25 gauge x 5/8" Syrg, 1 Syringe by Misc.(Non-Drug; Combo Route) route every 30 days., Disp: 3 each, Rfl: 4    TRELEGY ELLIPTA 100-62.5-25 mcg DsDv, INHALE ONE PUFF INTO THE LUNGS ONCE DAILY, Disp: 60 each, Rfl: 5    budesonide-glycopyr-formoterol (BREZTRI AEROSPHERE) 160-9-4.8 mcg/actuation HFAA, Inhale 2 puffs into the lungs 2 (two) times a day. (Patient not taking: Reported on 1/26/2023), Disp: 10.7 g, Rfl: 5    Allergies    Review of patient's allergies indicates:   Allergen Reactions    Zosyn [piperacillin-tazobactam] Swelling     Swelling to lips       SocHx    Social History     Tobacco Use   Smoking Status Every Day    Packs/day: 0.50    Years: 40.00    Pack years: 20.00    Types: Cigarettes   Smokeless Tobacco Never   Tobacco Comments    currently smoking 1/2 PPD       Social History     Substance and Sexual Activity   Alcohol Use No       Drug Use - no  Occupation - retired   Asbestos exposure - no  Pets - na    FMHx    Family History   Problem Relation Age of Onset    Breast cancer Maternal Aunt     Heart disease Father     Ovarian cancer Sister     Lung cancer Brother          Review of Systems  Review of Systems   Constitutional:  Positive for malaise/fatigue. Negative for chills, diaphoresis, fever and weight loss.   HENT:  Negative for " congestion, ear discharge, ear pain, hearing loss, nosebleeds, sinus pain, sore throat and tinnitus.    Eyes:  Negative for pain.   Respiratory:  Positive for cough, shortness of breath and wheezing. Negative for hemoptysis, sputum production and stridor.    Cardiovascular:  Positive for chest pain (intermittent sharp pain) and leg swelling (later in day). Negative for palpitations, orthopnea, claudication and PND.   Gastrointestinal:  Negative for abdominal pain, blood in stool, constipation, diarrhea, heartburn, melena, nausea and vomiting.   Genitourinary:  Negative for dysuria, frequency, hematuria and urgency.   Musculoskeletal:  Positive for back pain. Negative for falls and myalgias.   Neurological:  Positive for weakness. Negative for dizziness, tingling, tremors, sensory change, speech change, focal weakness, seizures, loss of consciousness and headaches.   Psychiatric/Behavioral:  Negative for depression, substance abuse and suicidal ideas. The patient is not nervous/anxious.      Physical Exam    Vitals:    01/26/23 0943   BP: (!) 140/80   BP Location: Right arm   Patient Position: Sitting   BP Method: Medium (Manual)   Pulse: 101   SpO2: 95%   Weight: 101.2 kg (223 lb)       Physical Exam  Vitals and nursing note reviewed.   Constitutional:       General: She is not in acute distress.     Appearance: Normal appearance. She is well-developed. She is not ill-appearing, toxic-appearing or diaphoretic.   HENT:      Head: Normocephalic and atraumatic.      Right Ear: External ear normal.      Left Ear: External ear normal.      Nose: Nose normal.   Eyes:      General: No scleral icterus.        Right eye: No discharge.         Left eye: No discharge.      Conjunctiva/sclera: Conjunctivae normal.      Pupils: Pupils are equal, round, and reactive to light.   Neck:      Thyroid: No thyromegaly.      Vascular: No carotid bruit or JVD.      Trachea: No tracheal deviation.   Cardiovascular:      Rate and Rhythm:  Regular rhythm. Tachycardia present.      Pulses: Normal pulses.      Heart sounds: Normal heart sounds. No murmur heard.    No friction rub. No gallop.   Pulmonary:      Effort: Pulmonary effort is normal. No respiratory distress.      Breath sounds: No stridor. Wheezing (few expiratory wheezes) present. No rhonchi or rales.      Comments: Decreased BS throughout  Chest:      Chest wall: No tenderness.   Abdominal:      General: Bowel sounds are normal. There is no distension.      Palpations: Abdomen is soft.      Tenderness: There is no abdominal tenderness. There is no guarding.   Musculoskeletal:         General: No tenderness. Normal range of motion.      Cervical back: Normal range of motion and neck supple. No rigidity or tenderness.      Right lower leg: No edema.      Left lower leg: No edema.   Lymphadenopathy:      Cervical: No cervical adenopathy.   Skin:     General: Skin is warm and dry.   Neurological:      General: No focal deficit present.      Mental Status: She is alert and oriented to person, place, and time. Mental status is at baseline.      Cranial Nerves: No cranial nerve deficit.   Psychiatric:         Mood and Affect: Mood normal.         Behavior: Behavior normal.         Thought Content: Thought content normal.         Judgment: Judgment normal.       Labs    Lab Results   Component Value Date    WBC 8.32 01/18/2023    HGB 13.9 01/18/2023    HCT 43.9 01/18/2023     01/18/2023       Sodium   Date Value Ref Range Status   01/18/2023 137 136 - 145 mmol/L Final   04/11/2019 136 134 - 144 mmol/L      Potassium   Date Value Ref Range Status   01/18/2023 4.1 3.5 - 5.1 mmol/L Final     Chloride   Date Value Ref Range Status   01/18/2023 105 95 - 110 mmol/L Final   04/11/2019 97 (L) 98 - 110 mmol/L      CO2   Date Value Ref Range Status   01/18/2023 23 23 - 29 mmol/L Final     Glucose   Date Value Ref Range Status   01/18/2023 99 70 - 110 mg/dL Final   04/11/2019 87 70 - 99 mg/dL      BUN    Date Value Ref Range Status   01/18/2023 12 8 - 23 mg/dL Final     Creatinine   Date Value Ref Range Status   01/18/2023 0.9 0.5 - 1.4 mg/dL Final   04/11/2019 0.86 0.60 - 1.40 mg/dL      Calcium   Date Value Ref Range Status   01/18/2023 9.2 8.7 - 10.5 mg/dL Final     Total Protein   Date Value Ref Range Status   01/18/2023 7.3 6.0 - 8.4 g/dL Final     Albumin   Date Value Ref Range Status   01/18/2023 4.0 3.5 - 5.2 g/dL Final   04/11/2019 4.4 3.1 - 4.7 g/dL      Total Bilirubin   Date Value Ref Range Status   01/18/2023 0.1 0.1 - 1.0 mg/dL Final     Comment:     For infants and newborns, interpretation of results should be based  on gestational age, weight and in agreement with clinical  observations.    Premature Infant recommended reference ranges:  Up to 24 hours.............<8.0 mg/dL  Up to 48 hours............<12.0 mg/dL  3-5 days..................<15.0 mg/dL  6-29 days.................<15.0 mg/dL       Alkaline Phosphatase   Date Value Ref Range Status   01/18/2023 50 (L) 55 - 135 U/L Final     AST   Date Value Ref Range Status   01/18/2023 23 10 - 40 U/L Final     ALT   Date Value Ref Range Status   01/18/2023 17 10 - 44 U/L Final     Anion Gap   Date Value Ref Range Status   01/18/2023 9 8 - 16 mmol/L Final       Xrays        Impression/Plan    Problem List Items Addressed This Visit          Pulmonary    COPD (chronic obstructive pulmonary disease)     Continue present medications but will give a trial of BREZTRI  Will refill medications as needed.  Instructed patient to contact us with any issues concerning their medications (cost, reactions, etc.).  Have discussed with patient about inciting conditions which may exacerbate their disease.  We did discuss possible new therapies or de-escalation of therapy (if appropriate).  Asked patient if they were interested in pursuing pulmonary rehabilitation.  All questions answered  RTC 3 months  Patient instructed that they are to call if symptoms change or new  "issues develop prior to their next visit.           Hypoxemia     Continue with home O2            Other    Anti-cardiolipin antibody positive     Aware          Personal history of tobacco use, presenting hazards to health     Currently smoking 1/2  packs per day> 50 pack years  I have counseled pt for 3-5 minutes regarding cigarette cessation.  This has included the need to stop smoking as well as strategies, including but not limited to "cold turkey", CHANTIX (including risks and benefits of the drug), nicotine replacement and WELLBUTRIN.                  Alexys Jackson MD              "

## 2023-02-04 NOTE — PROGRESS NOTES
[facility] hematology Oncology in office subsequent encounter note  1/26/23  Bassam Givens MD, Hazel Tavera        Presented with LUQ pain.  Had splenic artery thrombus with splenic infarct.  On Eliquis 5mg BID.  No LUQ pain sx now.    Coagulation evaluation increased Factor 8 and increased Anti cardiolipid IgM antibody levels.  Persistently increased on repeat determination.    Estimate 30% risk of clot event over next 2-3 years.    Continue Eliquis 5mg BID therapeutic and prophylaxis.    She admits to blurred vision and dizzyness intermittently, sx predated the splenic infarct and have not changed while on Eliquis.    History of COPD.  On 02 24/7.    Re check her CAT of abdomen  regards status of splenic infarct showed resolution of splenic infarct area. 2019.    Continue Eliquis BID.      She is seen nurse practitioner Gabriel for general care and for thyroid management.     She saw her cardiologist, Dr. Oliva, due for chemical stress test.    Her son had COVID, but was asymptomatic.  She did not have symptoms and isolated herself.    She has had 2 of 2 vaccines and the booster shot and the flu shot.  She received the pneumonia vaccination 2 years ago.  She has not taken the shingles vaccine.    She has COPD and is on oxygen 2 liters/minute 24/7.     No acute distress, does not appear chronically ill.  She does not have palpable lymphadenopathy.  Her lungs are clear bilaterally.  She has regular rhythm without murmur.  Abdomen is flat without distention, abdomen is nontender without palpable hepatomegaly or splenomegaly or abdominal mass.  Calves are nontender without edema, no petechiae or purpura.  Neurologically grossly intact    History of splenic infarct with  Increased factor 8 at 250 and increased anticardiolipin antibody at 13.  Now.    Continue Eliquis 5 mg b.i.d..  Return to clinic in 6 months.    H/H 6/20  Ferritin 5  B 12 69    Gave Ferrlicet x's 16  weeks.  Energy 2/10 to 10/10.  B 12  weekly x's 4 then monthly.  Stool for occult blood.  Stool heme negative x's 6.    Hgb 7 to 12 to 14.5  Ferritin 5 to 108.  B 12 now 551, continue B 12 monthly.    Fe deficiency anemia, B 12 deficiency anemia.  Check lab 7/2023. RTC 7/2023.

## 2023-04-27 ENCOUNTER — OFFICE VISIT (OUTPATIENT)
Dept: PULMONOLOGY | Facility: CLINIC | Age: 68
End: 2023-04-27
Payer: MEDICARE

## 2023-04-27 VITALS
WEIGHT: 220 LBS | SYSTOLIC BLOOD PRESSURE: 130 MMHG | HEART RATE: 86 BPM | BODY MASS INDEX: 38.36 KG/M2 | DIASTOLIC BLOOD PRESSURE: 80 MMHG | OXYGEN SATURATION: 96 %

## 2023-04-27 DIAGNOSIS — Z87.891 PERSONAL HISTORY OF TOBACCO USE, PRESENTING HAZARDS TO HEALTH: ICD-10-CM

## 2023-04-27 DIAGNOSIS — J44.9 CHRONIC OBSTRUCTIVE PULMONARY DISEASE, UNSPECIFIED COPD TYPE: ICD-10-CM

## 2023-04-27 DIAGNOSIS — R09.02 HYPOXEMIA: ICD-10-CM

## 2023-04-27 PROCEDURE — 99214 PR OFFICE/OUTPT VISIT, EST, LEVL IV, 30-39 MIN: ICD-10-PCS | Mod: S$GLB,,, | Performed by: INTERNAL MEDICINE

## 2023-04-27 PROCEDURE — 99214 OFFICE O/P EST MOD 30 MIN: CPT | Mod: S$GLB,,, | Performed by: INTERNAL MEDICINE

## 2023-04-27 NOTE — PROGRESS NOTES
Office Visit Note *    Patient Name: Clarita Gómez  MRN: [id number]  : 1955      Reason for visit: COPD    HPI:     3/31/2021 - 66 yo female diagnosed with COPD a few years ago (unsure of severity) currently on ICS/LABA (changed from BREO to SYMBICORT) referred here for evaluation.  Has chronic SOB, BOWERS, intermittent wheezing.  She continues to smoke, currently 1/2 PPD (smoked about 2 PPD for 40+ years).  She states that she has not been able to fully quit.  Has never had a LDSCT chest (has a brother with h/o lung cancer).  Also has a h/o anticardiolipin antibody and elevated factor VIII and has been on chronic anticoagulation (sees Dr Mayer).  ROS as below.    5/10/2021 - Here for follow up, reviewed PFT with pt (FEV1 - 30%, DLCO - 12%), walk test (showed need fpr 2 LPM) and CT scan.  We will plan to change to TRELEGY (sample given and instructed).  She got Covid vaccine (Moderna)    2021 - Here for follow up, Pt is currently stable on present medications with no recent increases in their symptoms or use of rescue medications.  Since our last visit there have been no hospitalizations or ER visits for their respiratory issues and there does not seem to be anything to suggest unrecognized exacerbations.  I have reviewed the medical regimen and re-educated the pt on the role of rescue and controlling medications.  Inhaler technique and understanding seems adequate.  The patient reports no issues with any of there medications for their COPD.  Refills will be taken care of as needed.  All questions answered.  Patient has no known corona virus exposures and has been practicing social distancing.  We have discussed the virus and precautions and all questions have been answered.  We did have a discussion about booster shots.  She had a son who had Covid back in March but she did not get it then.  Still smoking about 1/2 PPD - we discussed this.    11/15/2021 - Here for follow up, Pt is currently stable on  "present medications with no recent increases in their symptoms or use of rescue medications.  Since our last visit there have been no hospitalizations or ER visits for their respiratory issues and there does not seem to be anything to suggest unrecognized exacerbations.  I have reviewed the medical regimen and re-educated the pt on the role of rescue and controlling medications.  Inhaler technique and understanding seems adequate.  The patient reports no issues with any of there medications for their COPD.  Refills will be taken care of as needed.  All questions answered.  Doesn't feel that TRELEGY "holds her" all day and wants to try BREZTRI to see if twice daily dosing helps.  Still smoking 10 cigarettes per day (we discussed this).  Has some dysphagia and is to see her PCP.  Patient has no known corona virus exposures and has been practicing social distancing.  We have discussed the virus and precautions and all questions have been answered.  Has had Covid vaccine (Moderna and plans to get booster)    8/31/2022 - Here for follow up, she felt that she did better on BREZTRI but insurance wouldn't cover and she is back on TRELEGY  but doesn't feel that she is "covered" for the whole day.  Pt is currently stable on present medications with no recent increases in their symptoms or use of rescue medications.  Since our last visit there have been no hospitalizations or ER visits for their respiratory issues and there does not seem to be anything to suggest unrecognized exacerbations.  I have reviewed the medical regimen and re-educated the pt on the role of rescue and controlling medications.  Inhaler technique and understanding seems adequate.  The patient reports no issues with any of there medications for their COPD.  Refills will be taken care of as needed.  All questions answered.  Still smoking about 10 cigarettes per day, did try to stop but didn't tolerate it.   Dysphagia is about the same - choking on stringy " meats (we discussed this) and I have recommended that she see a GI MD.  Patient has no known corona virus exposures and has been practicing social distancing.  We have discussed the virus and precautions and all questions have been answered.  She has had Covid vaccine and 2 boosters    1/26/2023 - Here for follow up, Pt is currently stable on present medications with no recent increases in their symptoms or use of rescue medications.  Since our last visit there have been no hospitalizations or ER visits for their respiratory issues and there does not seem to be anything to suggest unrecognized exacerbations.  I have reviewed the medical regimen and re-educated the pt on the role of rescue and controlling medications.  Inhaler technique and understanding seems adequate.  The patient reports no issues with any of there medications for their COPD.  Refills will be taken care of as needed.  All questions answered.  Still about 10 cigarettes per day and I have encouraged her to continue to try and stop.  She had 2nd shingles vaccine and has significant swelling and pain in her left arm (we discussed this).  She had Covid mid December and had symptoms for about 2 weeks and was treated with paxlovid.    4/27/2023 - Here for follow up, Pt is currently stable on present medications with no recent increases in their symptoms or use of rescue medications.  Since our last visit there have been no hospitalizations or ER visits for their respiratory issues and there does not seem to be anything to suggest unrecognized exacerbations.  I have reviewed the medical regimen and re-educated the pt on the role of rescue and controlling medications.  Inhaler technique and understanding seems adequate.  The patient reports no issues with any of there medications for their COPD.  Refills will be taken care of as needed.  All questions answered.  Still smoking about 1/2 PPD and has been trying to quit but not much luck yet.  No recent  issues with Covid.      COPD Flowsheet    GOLD III D   Last PFT - 4/21  FEV1- 30 % DLCO - 12 %     + ICS/LABA/LAMA    + prn JAYLA    mMRC -  0 - SOB with strenuous exercise   -  1 - SOB level ground, slight hill   -  2 - SOB walk slower or stop for breath level ground   - + 3 - SOB at 100 yards or after few minutes   -  4 - SOB in house, dressing    Referral to PULMONARY REHABILITATION -  NO    Vitamin D tested - no    Testosterone checked (male) - na    Tested for alpha-1-antitrypsin - NO      Low Dose Screening CT Chest    Cigarettes - 2 PPD x 40 YEARS  Still smoking -  YES  QUIT no    Date of last LD CT - 4/21    Result - scarring - needs repeat now    I have discussed with pt about using a screening CT of the chest due to history of cigarette smoking.  We have discussed the possible findings and possible actions as a result of these findings.  The pt would like to proceed.    Vaccination Status    Flu vaccination status - 22/23    Pneumonia vaccination status - +    Covid vaccination status - + (Moderna) and booster x 2    Tetanus/diptheria vaccination status - unsure    Shingles vaccination status - No    Home Oxygen Qualification    Qualifier - O2 sat 87 on room air  (,exertion    Date - 4/2021          O2 sat 94% on 2 LPM via nasal cannuli    DX - COPD    Face to face visit with pt concerning the need for O2 therapy, all questions answered.  I stressed the need for compliance.  Pt to call with any questions.              Past Medical History    Past Medical History:   Diagnosis Date    Anticardiolipin antibody positive     COPD (chronic obstructive pulmonary disease)     Hyperlipidemia     Hypothyroid     Personal history of tobacco use, presenting hazards to health     Splenic infarct        Past Surgical History    Past Surgical History:   Procedure Laterality Date    CHOLECYSTECTOMY      COLECTOMY      partial due to diverticulosis    HYSTERECTOMY         Medications      Current Outpatient Medications:      "acetaminophen (TYLENOL) 500 MG tablet, Take 500 mg by mouth every 6 (six) hours as needed for Pain., Disp: , Rfl:     albuterol (PROVENTIL) 2.5 mg /3 mL (0.083 %) nebulizer solution, Take 2.5 mg by nebulization every 6 (six) hours as needed for Wheezing. Rescue, Disp: , Rfl:     ALBUTEROL INHL, Inhale into the lungs., Disp: , Rfl:     baclofen (LIORESAL) 10 MG tablet, baclofen 10 mg tablet  TAKE 1 TABLET BY MOUTH THREE TIMES DAILY, Disp: , Rfl:     cyanocobalamin 1,000 mcg/mL injection, Inject 1 mL (1,000 mcg total) into the skin every 30 days., Disp: 3 mL, Rfl: 4    diclofenac sodium (VOLTAREN) 1 % Gel, , Disp: , Rfl:     ELIQUIS 5 mg Tab, TAKE ONE TABLET (5 MG) BY MOUTH TWO TIMES A DAY, Disp: 60 tablet, Rfl: 7    ergocalciferol (ERGOCALCIFEROL) 50,000 unit Cap, Take 50,000 Units by mouth every 7 days., Disp: , Rfl:     fluticasone-umeclidin-vilanter (TRELEGY ELLIPTA) 100-62.5-25 mcg DsDv, INHALE ONE PUFF INTO THE LUNGS ONCE DAILY, Disp: 60 each, Rfl: 11    levothyroxine (SYNTHROID) 112 MCG tablet, levothyroxine 112 mcg tablet  Take 1 tablet every day by oral route., Disp: , Rfl:     nystatin (MYCOSTATIN) 100,000 unit/mL suspension, nystatin 100,000 unit/mL oral suspension, Disp: , Rfl:     pomegranate xt/pomegranat seed (POMEGRANATE ORAL), Take by mouth., Disp: , Rfl:     pravastatin (PRAVACHOL) 40 MG tablet, pravastatin 40 mg tablet  Take 1 tablet every day by oral route for 90 days., Disp: , Rfl:     prenatal 105-iron-folic ac-dha 30 mg iron- 1.4 mg-300 mg Cmpk, Take by mouth., Disp: , Rfl:     syringe with needle, safety 3 mL 25 gauge x 5/8" Syrg, 1 Syringe by Misc.(Non-Drug; Combo Route) route every 30 days., Disp: 3 each, Rfl: 4    budesonide-glycopyr-formoterol (BREZTRI AEROSPHERE) 160-9-4.8 mcg/actuation HFAA, Inhale 2 puffs into the lungs 2 (two) times a day. (Patient not taking: Reported on 1/26/2023), Disp: 10.7 g, Rfl: 5    Allergies    Review of patient's allergies indicates:   Allergen Reactions    " Zosyn [piperacillin-tazobactam] Swelling     Swelling to lips       SocHx    Social History     Tobacco Use   Smoking Status Every Day    Packs/day: 0.50    Years: 40.00    Pack years: 20.00    Types: Cigarettes   Smokeless Tobacco Never   Tobacco Comments    currently smoking 1/2 PPD       Social History     Substance and Sexual Activity   Alcohol Use No       Drug Use - no  Occupation - retired   Asbestos exposure - no  Pets - na    FMHx    Family History   Problem Relation Age of Onset    Breast cancer Maternal Aunt     Heart disease Father     Ovarian cancer Sister     Lung cancer Brother          Review of Systems  Review of Systems   Constitutional:  Positive for malaise/fatigue. Negative for chills, diaphoresis, fever and weight loss.   HENT:  Negative for congestion, ear discharge, ear pain, hearing loss, nosebleeds, sinus pain, sore throat and tinnitus.    Eyes:  Negative for pain.   Respiratory:  Positive for cough, shortness of breath and wheezing. Negative for hemoptysis, sputum production and stridor.    Cardiovascular:  Positive for chest pain (intermittent sharp pain) and leg swelling (later in day). Negative for palpitations, orthopnea, claudication and PND.   Gastrointestinal:  Negative for abdominal pain, blood in stool, constipation, diarrhea, heartburn, melena, nausea and vomiting.   Genitourinary:  Negative for dysuria, frequency, hematuria and urgency.   Musculoskeletal:  Positive for back pain. Negative for falls and myalgias.   Neurological:  Positive for weakness. Negative for dizziness, tingling, tremors, sensory change, speech change, focal weakness, seizures, loss of consciousness and headaches.   Psychiatric/Behavioral:  Negative for depression, substance abuse and suicidal ideas. The patient is not nervous/anxious.      Physical Exam    Vitals:    04/27/23 0950   BP: 130/80   BP Location: Left arm   Patient Position: Sitting   BP Method: Medium (Manual)   Pulse: 86   SpO2: 96%    Weight: 99.8 kg (220 lb)       Physical Exam  Vitals and nursing note reviewed.   Constitutional:       General: She is not in acute distress.     Appearance: Normal appearance. She is well-developed. She is not ill-appearing, toxic-appearing or diaphoretic.   HENT:      Head: Normocephalic and atraumatic.      Right Ear: External ear normal.      Left Ear: External ear normal.      Nose: Nose normal.   Eyes:      General: No scleral icterus.        Right eye: No discharge.         Left eye: No discharge.      Conjunctiva/sclera: Conjunctivae normal.      Pupils: Pupils are equal, round, and reactive to light.   Neck:      Thyroid: No thyromegaly.      Vascular: No carotid bruit or JVD.      Trachea: No tracheal deviation.   Cardiovascular:      Rate and Rhythm: Regular rhythm. Tachycardia present.      Pulses: Normal pulses.      Heart sounds: Normal heart sounds. No murmur heard.    No friction rub. No gallop.   Pulmonary:      Effort: Pulmonary effort is normal. No respiratory distress.      Breath sounds: No stridor. Wheezing (few expiratory wheezes) present. No rhonchi or rales.      Comments: Decreased BS throughout  Chest:      Chest wall: No tenderness.   Abdominal:      General: Bowel sounds are normal. There is no distension.      Palpations: Abdomen is soft.      Tenderness: There is no abdominal tenderness. There is no guarding.   Musculoskeletal:         General: No tenderness. Normal range of motion.      Cervical back: Normal range of motion and neck supple. No rigidity or tenderness.      Right lower leg: No edema.      Left lower leg: No edema.   Lymphadenopathy:      Cervical: No cervical adenopathy.   Skin:     General: Skin is warm and dry.   Neurological:      General: No focal deficit present.      Mental Status: She is alert and oriented to person, place, and time. Mental status is at baseline.      Cranial Nerves: No cranial nerve deficit.   Psychiatric:         Mood and Affect: Mood  normal.         Behavior: Behavior normal.         Thought Content: Thought content normal.         Judgment: Judgment normal.       Labs    Lab Results   Component Value Date    WBC 8.32 01/18/2023    HGB 13.9 01/18/2023    HCT 43.9 01/18/2023     01/18/2023       Sodium   Date Value Ref Range Status   01/18/2023 137 136 - 145 mmol/L Final   04/11/2019 136 134 - 144 mmol/L      Potassium   Date Value Ref Range Status   01/18/2023 4.1 3.5 - 5.1 mmol/L Final     Chloride   Date Value Ref Range Status   01/18/2023 105 95 - 110 mmol/L Final   04/11/2019 97 (L) 98 - 110 mmol/L      CO2   Date Value Ref Range Status   01/18/2023 23 23 - 29 mmol/L Final     Glucose   Date Value Ref Range Status   01/18/2023 99 70 - 110 mg/dL Final   04/11/2019 87 70 - 99 mg/dL      BUN   Date Value Ref Range Status   01/18/2023 12 8 - 23 mg/dL Final     Creatinine   Date Value Ref Range Status   01/18/2023 0.9 0.5 - 1.4 mg/dL Final   04/11/2019 0.86 0.60 - 1.40 mg/dL      Calcium   Date Value Ref Range Status   01/18/2023 9.2 8.7 - 10.5 mg/dL Final     Total Protein   Date Value Ref Range Status   01/18/2023 7.3 6.0 - 8.4 g/dL Final     Albumin   Date Value Ref Range Status   01/18/2023 4.0 3.5 - 5.2 g/dL Final   04/11/2019 4.4 3.1 - 4.7 g/dL      Total Bilirubin   Date Value Ref Range Status   01/18/2023 0.1 0.1 - 1.0 mg/dL Final     Comment:     For infants and newborns, interpretation of results should be based  on gestational age, weight and in agreement with clinical  observations.    Premature Infant recommended reference ranges:  Up to 24 hours.............<8.0 mg/dL  Up to 48 hours............<12.0 mg/dL  3-5 days..................<15.0 mg/dL  6-29 days.................<15.0 mg/dL       Alkaline Phosphatase   Date Value Ref Range Status   01/18/2023 50 (L) 55 - 135 U/L Final     AST   Date Value Ref Range Status   01/18/2023 23 10 - 40 U/L Final     ALT   Date Value Ref Range Status   01/18/2023 17 10 - 44 U/L Final  "    Anion Gap   Date Value Ref Range Status   01/18/2023 9 8 - 16 mmol/L Final       Xrays        Impression/Plan    Problem List Items Addressed This Visit          Pulmonary    COPD (chronic obstructive pulmonary disease)     Continue present medications but will give a trial of BREZTRI  Will refill medications as needed.  Instructed patient to contact us with any issues concerning their medications (cost, reactions, etc.).  Have discussed with patient about inciting conditions which may exacerbate their disease.  We did discuss possible new therapies or de-escalation of therapy (if appropriate).  Asked patient if they were interested in pursuing pulmonary rehabilitation.  All questions answered  RTC 3 months  Patient instructed that they are to call if symptoms change or new issues develop prior to their next visit.           Hypoxemia     Continue with home O2            Other    Personal history of tobacco use, presenting hazards to health     Currently smoking 1/2  packs per day> 50 pack years  I have counseled pt for 3-5 minutes regarding cigarette cessation.  This has included the need to stop smoking as well as strategies, including but not limited to "cold turkey", CHANTIX (including risks and benefits of the drug), nicotine replacement and WELLBUTRIN.                    Alexys Jackson MD                "

## 2023-06-06 DIAGNOSIS — J44.9 CHRONIC OBSTRUCTIVE PULMONARY DISEASE, UNSPECIFIED COPD TYPE: Primary | ICD-10-CM

## 2023-06-06 RX ORDER — ALBUTEROL SULFATE 90 UG/1
2 AEROSOL, METERED RESPIRATORY (INHALATION) EVERY 4 HOURS PRN
Qty: 8.5 G | Refills: 11 | Status: SHIPPED | OUTPATIENT
Start: 2023-06-06

## 2023-07-19 ENCOUNTER — TELEPHONE (OUTPATIENT)
Dept: HEMATOLOGY/ONCOLOGY | Facility: CLINIC | Age: 68
End: 2023-07-19

## 2023-07-19 NOTE — TELEPHONE ENCOUNTER
I spoke with pt and reminded her to have labs done prior to appt here on 7/26/23 pt verbalized understanding.

## 2023-07-20 ENCOUNTER — LAB VISIT (OUTPATIENT)
Dept: LAB | Facility: HOSPITAL | Age: 68
End: 2023-07-20
Attending: INTERNAL MEDICINE
Payer: MEDICARE

## 2023-07-20 DIAGNOSIS — D50.0 IRON DEFICIENCY ANEMIA DUE TO CHRONIC BLOOD LOSS: ICD-10-CM

## 2023-07-20 DIAGNOSIS — E53.8 B12 DEFICIENCY: ICD-10-CM

## 2023-07-20 LAB
BASOPHILS # BLD AUTO: 0.08 K/UL (ref 0–0.2)
BASOPHILS NFR BLD: 0.7 % (ref 0–1.9)
DIFFERENTIAL METHOD: NORMAL
EOSINOPHIL # BLD AUTO: 0.4 K/UL (ref 0–0.5)
EOSINOPHIL NFR BLD: 3.5 % (ref 0–8)
ERYTHROCYTE [DISTWIDTH] IN BLOOD BY AUTOMATED COUNT: 13 % (ref 11.5–14.5)
FERRITIN SERPL-MCNC: 10 NG/ML (ref 20–300)
HCT VFR BLD AUTO: 43.4 % (ref 37–48.5)
HGB BLD-MCNC: 13.9 G/DL (ref 12–16)
IMM GRANULOCYTES # BLD AUTO: 0.04 K/UL (ref 0–0.04)
IMM GRANULOCYTES NFR BLD AUTO: 0.4 % (ref 0–0.5)
LYMPHOCYTES # BLD AUTO: 3.3 K/UL (ref 1–4.8)
LYMPHOCYTES NFR BLD: 30 % (ref 18–48)
MCH RBC QN AUTO: 29.3 PG (ref 27–31)
MCHC RBC AUTO-ENTMCNC: 32 G/DL (ref 32–36)
MCV RBC AUTO: 91 FL (ref 82–98)
MONOCYTES # BLD AUTO: 1 K/UL (ref 0.3–1)
MONOCYTES NFR BLD: 9.5 % (ref 4–15)
NEUTROPHILS # BLD AUTO: 6.1 K/UL (ref 1.8–7.7)
NEUTROPHILS NFR BLD: 55.9 % (ref 38–73)
NRBC BLD-RTO: 0 /100 WBC
PLATELET # BLD AUTO: 302 K/UL (ref 150–450)
PMV BLD AUTO: 9.4 FL (ref 9.2–12.9)
RBC # BLD AUTO: 4.75 M/UL (ref 4–5.4)
VIT B12 SERPL-MCNC: 315 PG/ML (ref 210–950)
WBC # BLD AUTO: 10.84 K/UL (ref 3.9–12.7)

## 2023-07-20 PROCEDURE — 85025 COMPLETE CBC W/AUTO DIFF WBC: CPT | Performed by: INTERNAL MEDICINE

## 2023-07-20 PROCEDURE — 36415 COLL VENOUS BLD VENIPUNCTURE: CPT | Performed by: INTERNAL MEDICINE

## 2023-07-20 PROCEDURE — 82728 ASSAY OF FERRITIN: CPT | Performed by: INTERNAL MEDICINE

## 2023-07-20 PROCEDURE — 82607 VITAMIN B-12: CPT | Performed by: INTERNAL MEDICINE

## 2023-07-26 ENCOUNTER — OFFICE VISIT (OUTPATIENT)
Dept: HEMATOLOGY/ONCOLOGY | Facility: CLINIC | Age: 68
End: 2023-07-26
Payer: MEDICARE

## 2023-07-26 VITALS
RESPIRATION RATE: 20 BRPM | BODY MASS INDEX: 37.51 KG/M2 | HEART RATE: 91 BPM | TEMPERATURE: 97 F | WEIGHT: 219.69 LBS | HEIGHT: 64 IN | DIASTOLIC BLOOD PRESSURE: 83 MMHG | SYSTOLIC BLOOD PRESSURE: 133 MMHG

## 2023-07-26 DIAGNOSIS — D51.8 DIETARY VITAMIN B12 DEFICIENCY ANEMIA: ICD-10-CM

## 2023-07-26 DIAGNOSIS — I72.8 SPLENIC ARTERY ANEURYSM: ICD-10-CM

## 2023-07-26 DIAGNOSIS — D73.5 SPLENIC INFARCT: Primary | ICD-10-CM

## 2023-07-26 DIAGNOSIS — D50.0 IRON DEFICIENCY ANEMIA DUE TO CHRONIC BLOOD LOSS: ICD-10-CM

## 2023-07-26 PROCEDURE — 99214 OFFICE O/P EST MOD 30 MIN: CPT | Mod: S$GLB,,, | Performed by: INTERNAL MEDICINE

## 2023-07-26 PROCEDURE — 99214 PR OFFICE/OUTPT VISIT, EST, LEVL IV, 30-39 MIN: ICD-10-PCS | Mod: S$GLB,,, | Performed by: INTERNAL MEDICINE

## 2023-07-26 NOTE — PROGRESS NOTES
[facility] hematology Oncology in office subsequent encounter note  7/26/23  Bassam Givens MD, Hazel Tavera        Presented with LUQ pain.  Had splenic artery thrombus with splenic infarct.  On Eliquis 5mg BID.  No LUQ pain sx now.    Coagulation evaluation increased Factor 8 and increased Anti cardiolipid IgM antibody levels.  Persistently increased on repeat determination.    Estimate 30% risk of clot event over next 2-3 years.    Continue Eliquis 5mg BID therapeutic and prophylaxis.    She admits to blurred vision and dizzyness intermittently, sx predated the splenic infarct and have not changed while on Eliquis.    History of COPD.  On 02 24/7.    Re check her CAT of abdomen  regards status of splenic infarct showed resolution of splenic infarct area. 2019.    Continue Eliquis BID.      She is seen nurse practitioner Gabriel for general care and for thyroid management.     She saw her cardiologist, Dr. Oliva, due for chemical stress test.    Her son had COVID, but was asymptomatic.  She did not have symptoms and isolated herself.    She has had 2 of 2 vaccines and the booster shot and the flu shot.  She received the pneumonia vaccination 2 years ago.  She has not taken the shingles vaccine.    She has COPD and is on oxygen 2 liters/minute 24/7.     No acute distress, does not appear chronically ill.  She does not have palpable lymphadenopathy.  Her lungs are clear bilaterally.  She has regular rhythm without murmur.  Abdomen is flat without distention, abdomen is nontender without palpable hepatomegaly or splenomegaly or abdominal mass.  Calves are nontender without edema, no petechiae or purpura.  Neurologically grossly intact    History of splenic infarct with  Increased factor 8 at 250 and increased anticardiolipin antibody at 13.  Now.    Continue Eliquis 5 mg b.i.d..  Return to clinic in 6 months.    H/H 6/20  Ferritin 5  B 12 69    Gave Ferrlicet x's 16  weeks.  Energy 2/10 to 10/10.  B 12  weekly x's 4 then monthly.  Stool for occult blood.  Stool heme negative x's 6.    Hgb 7 to 12 to 14.5 to 13.9.  Ferritin 5 to 108. To 10.  B 12  551, to 315, increase  B 12 monthly. To BID.    Fe deficiency anemia, B 12 deficiency anemia.  Schedule Injectafer x's 1 to increase Ferritin.  Check stool for heme.    Check CT of abdomin [facility].  Check lad 2 1/2 months.  RTC 3 months.

## 2023-07-27 ENCOUNTER — TELEPHONE (OUTPATIENT)
Dept: HEMATOLOGY/ONCOLOGY | Facility: CLINIC | Age: 68
End: 2023-07-27

## 2023-07-27 ENCOUNTER — OFFICE VISIT (OUTPATIENT)
Dept: PULMONOLOGY | Facility: CLINIC | Age: 68
End: 2023-07-27
Payer: MEDICARE

## 2023-07-27 VITALS
HEART RATE: 92 BPM | DIASTOLIC BLOOD PRESSURE: 80 MMHG | WEIGHT: 220.63 LBS | SYSTOLIC BLOOD PRESSURE: 140 MMHG | OXYGEN SATURATION: 97 % | BODY MASS INDEX: 38.46 KG/M2

## 2023-07-27 DIAGNOSIS — R09.02 HYPOXEMIA: ICD-10-CM

## 2023-07-27 DIAGNOSIS — J44.9 CHRONIC OBSTRUCTIVE PULMONARY DISEASE, UNSPECIFIED COPD TYPE: ICD-10-CM

## 2023-07-27 DIAGNOSIS — E53.8 B12 DEFICIENCY: Primary | ICD-10-CM

## 2023-07-27 DIAGNOSIS — Z87.891 PERSONAL HISTORY OF TOBACCO USE, PRESENTING HAZARDS TO HEALTH: ICD-10-CM

## 2023-07-27 PROCEDURE — 99406 PR TOBACCO USE CESSATION INTERMEDIATE 3-10 MINUTES: ICD-10-PCS | Mod: S$GLB,,, | Performed by: INTERNAL MEDICINE

## 2023-07-27 PROCEDURE — 99406 BEHAV CHNG SMOKING 3-10 MIN: CPT | Mod: S$GLB,,, | Performed by: INTERNAL MEDICINE

## 2023-07-27 PROCEDURE — 99214 PR OFFICE/OUTPT VISIT, EST, LEVL IV, 30-39 MIN: ICD-10-PCS | Mod: 25,S$GLB,, | Performed by: INTERNAL MEDICINE

## 2023-07-27 PROCEDURE — 99214 OFFICE O/P EST MOD 30 MIN: CPT | Mod: 25,S$GLB,, | Performed by: INTERNAL MEDICINE

## 2023-07-27 RX ORDER — DIPHENHYDRAMINE HYDROCHLORIDE 50 MG/ML
50 INJECTION INTRAMUSCULAR; INTRAVENOUS ONCE AS NEEDED
Status: CANCELLED | OUTPATIENT
Start: 2023-08-03

## 2023-07-27 RX ORDER — SYRINGE-NEEDLE,INSULIN,0.5 ML 28GX1/2"
SYRINGE, EMPTY DISPOSABLE MISCELLANEOUS
Qty: 6 EACH | Refills: 4 | Status: SHIPPED | OUTPATIENT
Start: 2023-07-27

## 2023-07-27 RX ORDER — SODIUM CHLORIDE 9 MG/ML
INJECTION, SOLUTION INTRAVENOUS CONTINUOUS
Status: CANCELLED | OUTPATIENT
Start: 2023-08-03

## 2023-07-27 RX ORDER — HEPARIN 100 UNIT/ML
5 SYRINGE INTRAVENOUS
Status: CANCELLED | OUTPATIENT
Start: 2023-08-03

## 2023-07-27 RX ORDER — SODIUM CHLORIDE 0.9 % (FLUSH) 0.9 %
10 SYRINGE (ML) INJECTION
Status: CANCELLED | OUTPATIENT
Start: 2023-08-03

## 2023-07-27 RX ORDER — EPINEPHRINE 0.3 MG/.3ML
0.3 INJECTION SUBCUTANEOUS ONCE AS NEEDED
Status: CANCELLED | OUTPATIENT
Start: 2023-08-03

## 2023-07-27 RX ORDER — CYANOCOBALAMIN 1000 UG/ML
INJECTION, SOLUTION INTRAMUSCULAR; SUBCUTANEOUS
Qty: 6 ML | Refills: 4 | Status: SHIPPED | OUTPATIENT
Start: 2023-07-27 | End: 2023-07-27

## 2023-07-27 RX ORDER — CYANOCOBALAMIN 1000 UG/ML
INJECTION, SOLUTION INTRAMUSCULAR; SUBCUTANEOUS
Qty: 6 ML | Refills: 4 | Status: SHIPPED | OUTPATIENT
Start: 2023-07-27

## 2023-07-27 NOTE — ASSESSMENT & PLAN NOTE
 Continue present medications she liked BREZTRI but insurance wouldn't cover   Will refill medications as needed.   Instructed patient to contact us with any issues concerning their medications (cost, reactions, etc.).   Have discussed with patient about inciting conditions which may exacerbate their disease.   We did discuss possible new therapies or de-escalation of therapy (if appropriate).   Asked patient if they were interested in pursuing pulmonary rehabilitation.   All questions answered   RTC 3 months   Patient instructed that they are to call if symptoms change or new issues develop prior to their next visit.

## 2023-07-27 NOTE — ASSESSMENT & PLAN NOTE
· Continue with home O2  · Having issues with her concentrator - batteries are not lasing and it is supposed to get looked at this week  · She is with [facility] and may be thinking about

## 2023-07-27 NOTE — PROGRESS NOTES
Office Visit Note *    Patient Name: Clarita Gómez  MRN: [id number]  : 1955      Reason for visit: COPD    HPI:     3/31/2021 - 66 yo female diagnosed with COPD a few years ago (unsure of severity) currently on ICS/LABA (changed from BREO to SYMBICORT) referred here for evaluation.  Has chronic SOB, BOWERS, intermittent wheezing.  She continues to smoke, currently 1/2 PPD (smoked about 2 PPD for 40+ years).  She states that she has not been able to fully quit.  Has never had a LDSCT chest (has a brother with h/o lung cancer).  Also has a h/o anticardiolipin antibody and elevated factor VIII and has been on chronic anticoagulation (sees Dr Mayer).  ROS as below.    5/10/2021 - Here for follow up, reviewed PFT with pt (FEV1 - 30%, DLCO - 12%), walk test (showed need fpr 2 LPM) and CT scan.  We will plan to change to TRELEGY (sample given and instructed).  She got Covid vaccine (Moderna)    2021 - Here for follow up, Pt is currently stable on present medications with no recent increases in their symptoms or use of rescue medications.  Since our last visit there have been no hospitalizations or ER visits for their respiratory issues and there does not seem to be anything to suggest unrecognized exacerbations.  I have reviewed the medical regimen and re-educated the pt on the role of rescue and controlling medications.  Inhaler technique and understanding seems adequate.  The patient reports no issues with any of there medications for their COPD.  Refills will be taken care of as needed.  All questions answered.  Patient has no known corona virus exposures and has been practicing social distancing.  We have discussed the virus and precautions and all questions have been answered.  We did have a discussion about booster shots.  She had a son who had Covid back in March but she did not get it then.  Still smoking about 1/2 PPD - we discussed this.    11/15/2021 - Here for follow up, Pt is currently stable on  "present medications with no recent increases in their symptoms or use of rescue medications.  Since our last visit there have been no hospitalizations or ER visits for their respiratory issues and there does not seem to be anything to suggest unrecognized exacerbations.  I have reviewed the medical regimen and re-educated the pt on the role of rescue and controlling medications.  Inhaler technique and understanding seems adequate.  The patient reports no issues with any of there medications for their COPD.  Refills will be taken care of as needed.  All questions answered.  Doesn't feel that TRELEGY "holds her" all day and wants to try BREZTRI to see if twice daily dosing helps.  Still smoking 10 cigarettes per day (we discussed this).  Has some dysphagia and is to see her PCP.  Patient has no known corona virus exposures and has been practicing social distancing.  We have discussed the virus and precautions and all questions have been answered.  Has had Covid vaccine (Moderna and plans to get booster)    8/31/2022 - Here for follow up, she felt that she did better on BREZTRI but insurance wouldn't cover and she is back on TRELEGY  but doesn't feel that she is "covered" for the whole day.  Pt is currently stable on present medications with no recent increases in their symptoms or use of rescue medications.  Since our last visit there have been no hospitalizations or ER visits for their respiratory issues and there does not seem to be anything to suggest unrecognized exacerbations.  I have reviewed the medical regimen and re-educated the pt on the role of rescue and controlling medications.  Inhaler technique and understanding seems adequate.  The patient reports no issues with any of there medications for their COPD.  Refills will be taken care of as needed.  All questions answered.  Still smoking about 10 cigarettes per day, did try to stop but didn't tolerate it.   Dysphagia is about the same - choking on stringy " meats (we discussed this) and I have recommended that she see a GI MD.  Patient has no known corona virus exposures and has been practicing social distancing.  We have discussed the virus and precautions and all questions have been answered.  She has had Covid vaccine and 2 boosters    1/26/2023 - Here for follow up, Pt is currently stable on present medications with no recent increases in their symptoms or use of rescue medications.  Since our last visit there have been no hospitalizations or ER visits for their respiratory issues and there does not seem to be anything to suggest unrecognized exacerbations.  I have reviewed the medical regimen and re-educated the pt on the role of rescue and controlling medications.  Inhaler technique and understanding seems adequate.  The patient reports no issues with any of there medications for their COPD.  Refills will be taken care of as needed.  All questions answered.  Still about 10 cigarettes per day and I have encouraged her to continue to try and stop.  She had 2nd shingles vaccine and has significant swelling and pain in her left arm (we discussed this).  She had Covid mid December and had symptoms for about 2 weeks and was treated with paxlovid.    4/27/2023 - Here for follow up, Pt is currently stable on present medications with no recent increases in their symptoms or use of rescue medications.  Since our last visit there have been no hospitalizations or ER visits for their respiratory issues and there does not seem to be anything to suggest unrecognized exacerbations.  I have reviewed the medical regimen and re-educated the pt on the role of rescue and controlling medications.  Inhaler technique and understanding seems adequate.  The patient reports no issues with any of there medications for their COPD.  Refills will be taken care of as needed.  All questions answered.  Still smoking about 1/2 PPD and has been trying to quit but not much luck yet.  No recent  issues with Covid.    7/27/2023 - here for follow up, Pt is currently stable on present medications with no recent increases in their symptoms or use of rescue medications.  Since our last visit there have been no hospitalizations or ER visits for their respiratory issues and there does not seem to be anything to suggest unrecognized exacerbations.  I have reviewed the medical regimen and re-educated the pt on the role of rescue and controlling medications.  Inhaler technique and understanding seems adequate.  The patient reports no issues with any of there medications for their COPD.  Refills will be taken care of as needed.  All questions answered.  But has noted some increased issues with allergies and congestion with the current heat.  Still smoking about 10 cigarettes/d (improved from about 2 PPD) - we discussed this.  Last ferritin is low and Dr Mayer is addressing this.      COPD Flowsheet    GOLD III D   Last PFT - 4/21  FEV1- 30 % DLCO - 12 %     + ICS/LABA/LAMA    + prn JAYLA    mMRC -  0 - SOB with strenuous exercise   -  1 - SOB level ground, slight hill   -  2 - SOB walk slower or stop for breath level ground   - + 3 - SOB at 100 yards or after few minutes   -  4 - SOB in house, dressing    Referral to PULMONARY REHABILITATION -  NO    Vitamin D tested - no    Testosterone checked (male) - na    Tested for alpha-1-antitrypsin - NO      Low Dose Screening CT Chest    Cigarettes - 2 PPD x 40 YEARS  Still smoking -  YES  QUIT no    Date of last LD CT - 4/21    Result - scarring - needs repeat now    I have discussed with pt about using a screening CT of the chest due to history of cigarette smoking.  We have discussed the possible findings and possible actions as a result of these findings.  The pt would like to proceed.    Vaccination Status    Flu vaccination status - 22/23    Pneumonia vaccination status - +    Covid vaccination status - + (Moderna) and booster x 2    Tetanus/diptheria vaccination  status - unsure    Shingles vaccination status - No    Home Oxygen Qualification    Qualifier - O2 sat 87 on room air  (,exertion    Date - 4/2021          O2 sat 94% on 2 LPM via nasal cannuli    DX - COPD    Face to face visit with pt concerning the need for O2 therapy, all questions answered.  I stressed the need for compliance.  Pt to call with any questions.              Past Medical History    Past Medical History:   Diagnosis Date    Anticardiolipin antibody positive     COPD (chronic obstructive pulmonary disease)     Hyperlipidemia     Hypothyroid     Personal history of tobacco use, presenting hazards to health     Splenic infarct        Past Surgical History    Past Surgical History:   Procedure Laterality Date    CHOLECYSTECTOMY      COLECTOMY      partial due to diverticulosis    HYSTERECTOMY         Medications      Current Outpatient Medications:     acetaminophen (TYLENOL) 500 MG tablet, Take 500 mg by mouth every 6 (six) hours as needed for Pain., Disp: , Rfl:     albuterol (PROVENTIL) 2.5 mg /3 mL (0.083 %) nebulizer solution, Take 2.5 mg by nebulization every 6 (six) hours as needed for Wheezing. Rescue, Disp: , Rfl:     albuterol (PROVENTIL/VENTOLIN HFA) 90 mcg/actuation inhaler, Inhale 2 puffs into the lungs every 4 (four) hours as needed for Wheezing. Rescue, Disp: 8.5 g, Rfl: 11    ALBUTEROL INHL, Inhale into the lungs., Disp: , Rfl:     baclofen (LIORESAL) 10 MG tablet, baclofen 10 mg tablet  TAKE 1 TABLET BY MOUTH THREE TIMES DAILY, Disp: , Rfl:     budesonide-glycopyr-formoterol (BREZTRI AEROSPHERE) 160-9-4.8 mcg/actuation HFAA, Inhale 2 puffs into the lungs 2 (two) times a day., Disp: 10.7 g, Rfl: 5    cyanocobalamin 1,000 mcg/mL injection, Inject 1 mL (1,000 mcg total) into the skin every 30 days., Disp: 3 mL, Rfl: 4    diclofenac sodium (VOLTAREN) 1 % Gel, , Disp: , Rfl:     ELIQUIS 5 mg Tab, TAKE ONE TABLET (5 MG) BY MOUTH TWO TIMES A DAY, Disp: 60 tablet, Rfl: 7    ergocalciferol  "(ERGOCALCIFEROL) 50,000 unit Cap, Take 50,000 Units by mouth every 7 days., Disp: , Rfl:     fluticasone-umeclidin-vilanter (TRELEGY ELLIPTA) 100-62.5-25 mcg DsDv, INHALE ONE PUFF INTO THE LUNGS ONCE DAILY, Disp: 60 each, Rfl: 11    levothyroxine (SYNTHROID) 112 MCG tablet, levothyroxine 112 mcg tablet  Take 1 tablet every day by oral route., Disp: , Rfl:     nystatin (MYCOSTATIN) 100,000 unit/mL suspension, nystatin 100,000 unit/mL oral suspension, Disp: , Rfl:     pomegranate xt/pomegranat seed (POMEGRANATE ORAL), Take by mouth., Disp: , Rfl:     pravastatin (PRAVACHOL) 40 MG tablet, pravastatin 40 mg tablet  Take 1 tablet every day by oral route for 90 days., Disp: , Rfl:     prenatal 105-iron-folic ac-dha 30 mg iron- 1.4 mg-300 mg Cmpk, Take by mouth., Disp: , Rfl:     syringe with needle, safety 3 mL 25 gauge x 5/8" Syrg, 1 Syringe by Misc.(Non-Drug; Combo Route) route every 30 days., Disp: 3 each, Rfl: 4    Allergies    Review of patient's allergies indicates:   Allergen Reactions    Zosyn [piperacillin-tazobactam] Swelling     Swelling to lips       SocHx    Social History     Tobacco Use   Smoking Status Every Day    Packs/day: 0.50    Years: 40.00    Pack years: 20.00    Types: Cigarettes   Smokeless Tobacco Never   Tobacco Comments    currently smoking 1/2 PPD       Social History     Substance and Sexual Activity   Alcohol Use No       Drug Use - no  Occupation - retired   Asbestos exposure - no  Pets - na    FMHx    Family History   Problem Relation Age of Onset    Breast cancer Maternal Aunt     Heart disease Father     Ovarian cancer Sister     Lung cancer Brother          Review of Systems  Review of Systems   Constitutional:  Positive for malaise/fatigue. Negative for chills, diaphoresis, fever and weight loss.   HENT:  Negative for congestion, ear discharge, ear pain, hearing loss, nosebleeds, sinus pain, sore throat and tinnitus.    Eyes:  Negative for pain.   Respiratory:  Positive for " cough, shortness of breath and wheezing. Negative for hemoptysis, sputum production and stridor.    Cardiovascular:  Positive for chest pain (intermittent sharp pain) and leg swelling (later in day). Negative for palpitations, orthopnea, claudication and PND.   Gastrointestinal:  Negative for abdominal pain, blood in stool, constipation, diarrhea, heartburn, melena, nausea and vomiting.   Genitourinary:  Negative for dysuria, frequency, hematuria and urgency.   Musculoskeletal:  Positive for back pain. Negative for falls and myalgias.   Neurological:  Positive for weakness. Negative for dizziness, tingling, tremors, sensory change, speech change, focal weakness, seizures, loss of consciousness and headaches.   Psychiatric/Behavioral:  Negative for depression, substance abuse and suicidal ideas. The patient is not nervous/anxious.      Physical Exam    Vitals:    07/27/23 1000   BP: (!) 140/80   BP Location: Left arm   Patient Position: Sitting   BP Method: Medium (Manual)   Pulse: 92   SpO2: 97%   Weight: 100.1 kg (220 lb 9.6 oz)       Physical Exam  Vitals and nursing note reviewed.   Constitutional:       General: She is not in acute distress.     Appearance: Normal appearance. She is well-developed. She is not ill-appearing, toxic-appearing or diaphoretic.   HENT:      Head: Normocephalic and atraumatic.      Right Ear: External ear normal.      Left Ear: External ear normal.      Nose: Nose normal.   Eyes:      General: No scleral icterus.        Right eye: No discharge.         Left eye: No discharge.      Conjunctiva/sclera: Conjunctivae normal.      Pupils: Pupils are equal, round, and reactive to light.   Neck:      Thyroid: No thyromegaly.      Vascular: No carotid bruit or JVD.      Trachea: No tracheal deviation.   Cardiovascular:      Rate and Rhythm: Regular rhythm. Tachycardia present.      Pulses: Normal pulses.      Heart sounds: Normal heart sounds. No murmur heard.    No friction rub. No gallop.    Pulmonary:      Effort: Pulmonary effort is normal. No respiratory distress.      Breath sounds: No stridor. Wheezing (few expiratory wheezes) present. No rhonchi or rales.      Comments: Decreased BS throughout  Chest:      Chest wall: No tenderness.   Abdominal:      General: Bowel sounds are normal. There is no distension.      Palpations: Abdomen is soft.      Tenderness: There is no abdominal tenderness. There is no guarding.   Musculoskeletal:         General: No tenderness. Normal range of motion.      Cervical back: Normal range of motion and neck supple. No rigidity or tenderness.      Right lower leg: No edema.      Left lower leg: No edema.   Lymphadenopathy:      Cervical: No cervical adenopathy.   Skin:     General: Skin is warm and dry.   Neurological:      General: No focal deficit present.      Mental Status: She is alert and oriented to person, place, and time. Mental status is at baseline.      Cranial Nerves: No cranial nerve deficit.   Psychiatric:         Mood and Affect: Mood normal.         Behavior: Behavior normal.         Thought Content: Thought content normal.         Judgment: Judgment normal.       Labs    Lab Results   Component Value Date    WBC 10.84 07/20/2023    HGB 13.9 07/20/2023    HCT 43.4 07/20/2023     07/20/2023       Sodium   Date Value Ref Range Status   01/18/2023 137 136 - 145 mmol/L Final   04/11/2019 136 134 - 144 mmol/L      Potassium   Date Value Ref Range Status   01/18/2023 4.1 3.5 - 5.1 mmol/L Final     Chloride   Date Value Ref Range Status   01/18/2023 105 95 - 110 mmol/L Final   04/11/2019 97 (L) 98 - 110 mmol/L      CO2   Date Value Ref Range Status   01/18/2023 23 23 - 29 mmol/L Final     Glucose   Date Value Ref Range Status   01/18/2023 99 70 - 110 mg/dL Final   04/11/2019 87 70 - 99 mg/dL      BUN   Date Value Ref Range Status   01/18/2023 12 8 - 23 mg/dL Final     Creatinine   Date Value Ref Range Status   01/18/2023 0.9 0.5 - 1.4 mg/dL Final    04/11/2019 0.86 0.60 - 1.40 mg/dL      Calcium   Date Value Ref Range Status   01/18/2023 9.2 8.7 - 10.5 mg/dL Final     Total Protein   Date Value Ref Range Status   01/18/2023 7.3 6.0 - 8.4 g/dL Final     Albumin   Date Value Ref Range Status   01/18/2023 4.0 3.5 - 5.2 g/dL Final   04/11/2019 4.4 3.1 - 4.7 g/dL      Total Bilirubin   Date Value Ref Range Status   01/18/2023 0.1 0.1 - 1.0 mg/dL Final     Comment:     For infants and newborns, interpretation of results should be based  on gestational age, weight and in agreement with clinical  observations.    Premature Infant recommended reference ranges:  Up to 24 hours.............<8.0 mg/dL  Up to 48 hours............<12.0 mg/dL  3-5 days..................<15.0 mg/dL  6-29 days.................<15.0 mg/dL       Alkaline Phosphatase   Date Value Ref Range Status   01/18/2023 50 (L) 55 - 135 U/L Final     AST   Date Value Ref Range Status   01/18/2023 23 10 - 40 U/L Final     ALT   Date Value Ref Range Status   01/18/2023 17 10 - 44 U/L Final     Anion Gap   Date Value Ref Range Status   01/18/2023 9 8 - 16 mmol/L Final       Xrays        Impression/Plan    Problem List Items Addressed This Visit          Pulmonary    COPD (chronic obstructive pulmonary disease)     Continue present medications she liked BREZTRI but insurance wouldn't cover  Will refill medications as needed.  Instructed patient to contact us with any issues concerning their medications (cost, reactions, etc.).  Have discussed with patient about inciting conditions which may exacerbate their disease.  We did discuss possible new therapies or de-escalation of therapy (if appropriate).  Asked patient if they were interested in pursuing pulmonary rehabilitation.  All questions answered  RTC 3 months  Patient instructed that they are to call if symptoms change or new issues develop prior to their next visit.           Hypoxemia     Continue with home O2  Having issues with her concentrator -  "batteries are not lasing and it is supposed to get looked at this week  She is with [facility] and may be thinking about             Other    Personal history of tobacco use, presenting hazards to health     Currently smoking 1/2  packs per day> 50 pack years  I have counseled pt for 3-5 minutes regarding cigarette cessation.  This has included the need to stop smoking as well as strategies, including but not limited to "cold turkey", CHANTIX (including risks and benefits of the drug), nicotine replacement and WELLBUTRIN.                      Alexys Jackson MD                  "

## 2023-07-28 NOTE — TELEPHONE ENCOUNTER
Orders placed in Epic for Injectafer x's 1.  [facility].  Get auth    Get date and time.    RTC 3 months.    Lab in 2 1/2 months.

## 2023-08-07 ENCOUNTER — INFUSION (OUTPATIENT)
Dept: INFUSION THERAPY | Facility: HOSPITAL | Age: 68
End: 2023-08-07
Attending: INTERNAL MEDICINE
Payer: MEDICARE

## 2023-08-07 VITALS
RESPIRATION RATE: 18 BRPM | WEIGHT: 219.88 LBS | BODY MASS INDEX: 38.96 KG/M2 | DIASTOLIC BLOOD PRESSURE: 83 MMHG | HEART RATE: 79 BPM | OXYGEN SATURATION: 98 % | SYSTOLIC BLOOD PRESSURE: 139 MMHG | TEMPERATURE: 98 F | HEIGHT: 63 IN

## 2023-08-07 DIAGNOSIS — E53.8 B12 DEFICIENCY: Primary | ICD-10-CM

## 2023-08-07 DIAGNOSIS — D50.0 IRON DEFICIENCY ANEMIA DUE TO CHRONIC BLOOD LOSS: ICD-10-CM

## 2023-08-07 PROCEDURE — 25000003 PHARM REV CODE 250: Performed by: INTERNAL MEDICINE

## 2023-08-07 PROCEDURE — 63600175 PHARM REV CODE 636 W HCPCS: Mod: JZ,JG | Performed by: INTERNAL MEDICINE

## 2023-08-07 PROCEDURE — A4216 STERILE WATER/SALINE, 10 ML: HCPCS | Performed by: INTERNAL MEDICINE

## 2023-08-07 PROCEDURE — 96365 THER/PROPH/DIAG IV INF INIT: CPT

## 2023-08-07 RX ORDER — SODIUM CHLORIDE 9 MG/ML
INJECTION, SOLUTION INTRAVENOUS CONTINUOUS
OUTPATIENT
Start: 2023-08-14

## 2023-08-07 RX ORDER — HEPARIN 100 UNIT/ML
5 SYRINGE INTRAVENOUS
OUTPATIENT
Start: 2023-08-14

## 2023-08-07 RX ORDER — SODIUM CHLORIDE 9 MG/ML
INJECTION, SOLUTION INTRAVENOUS CONTINUOUS
Status: DISCONTINUED | OUTPATIENT
Start: 2023-08-07 | End: 2023-08-07 | Stop reason: HOSPADM

## 2023-08-07 RX ORDER — SODIUM CHLORIDE 0.9 % (FLUSH) 0.9 %
10 SYRINGE (ML) INJECTION
Status: DISCONTINUED | OUTPATIENT
Start: 2023-08-07 | End: 2023-08-07 | Stop reason: HOSPADM

## 2023-08-07 RX ORDER — DIPHENHYDRAMINE HYDROCHLORIDE 50 MG/ML
50 INJECTION INTRAMUSCULAR; INTRAVENOUS ONCE AS NEEDED
OUTPATIENT
Start: 2023-08-14

## 2023-08-07 RX ORDER — SODIUM CHLORIDE 0.9 % (FLUSH) 0.9 %
10 SYRINGE (ML) INJECTION
OUTPATIENT
Start: 2023-08-14

## 2023-08-07 RX ORDER — EPINEPHRINE 0.3 MG/.3ML
0.3 INJECTION SUBCUTANEOUS ONCE AS NEEDED
OUTPATIENT
Start: 2023-08-14

## 2023-08-07 RX ADMIN — FERRIC CARBOXYMALTOSE INJECTION 750 MG: 50 INJECTION, SOLUTION INTRAVENOUS at 10:08

## 2023-08-07 RX ADMIN — SODIUM CHLORIDE, PRESERVATIVE FREE 10 ML: 5 INJECTION INTRAVENOUS at 10:08

## 2023-08-07 RX ADMIN — SODIUM CHLORIDE: 0.9 INJECTION, SOLUTION INTRAVENOUS at 10:08

## 2023-08-07 NOTE — PLAN OF CARE
Problem: Fatigue  Goal: Improved Activity Tolerance  8/7/2023 0955 by Kate Escalera, RN  Outcome: Ongoing, Progressing  8/7/2023 0954 by Kate Escalera, RN  Outcome: Ongoing, Progressing  Intervention: Promote Improved Energy  Flowsheets (Taken 8/7/2023 0955)  Fatigue Management: fatigue-related activity identified  Sleep/Rest Enhancement: noise level reduced  Activity Management: Up in chair - L3

## 2023-08-15 ENCOUNTER — LAB VISIT (OUTPATIENT)
Dept: LAB | Facility: HOSPITAL | Age: 68
End: 2023-08-15
Attending: INTERNAL MEDICINE
Payer: MEDICARE

## 2023-08-15 DIAGNOSIS — D50.0 IRON DEFICIENCY ANEMIA DUE TO CHRONIC BLOOD LOSS: ICD-10-CM

## 2023-08-15 LAB
OB PNL STL: NEGATIVE
OB PNL STL: POSITIVE
OB PNL STL: POSITIVE

## 2023-08-15 PROCEDURE — 82272 OCCULT BLD FECES 1-3 TESTS: CPT | Mod: 91 | Performed by: INTERNAL MEDICINE

## 2023-08-23 DIAGNOSIS — F17.210 NICOTINE DEPENDENCE, CIGARETTES, UNCOMPLICATED: Primary | ICD-10-CM

## 2023-10-09 ENCOUNTER — TELEPHONE (OUTPATIENT)
Dept: HEMATOLOGY/ONCOLOGY | Facility: CLINIC | Age: 68
End: 2023-10-09

## 2023-10-09 DIAGNOSIS — D73.5 SPLENIC INFARCT: Primary | ICD-10-CM

## 2023-10-09 DIAGNOSIS — I72.8 SPLENIC ARTERY ANEURYSM: ICD-10-CM

## 2023-10-17 DIAGNOSIS — R76.0 ANTI-CARDIOLIPIN ANTIBODY POSITIVE: ICD-10-CM

## 2023-10-17 RX ORDER — APIXABAN 5 MG/1
5 TABLET, FILM COATED ORAL 2 TIMES DAILY
Qty: 60 TABLET | Refills: 7 | Status: SHIPPED | OUTPATIENT
Start: 2023-10-17

## 2023-10-19 ENCOUNTER — HOSPITAL ENCOUNTER (OUTPATIENT)
Dept: RADIOLOGY | Facility: HOSPITAL | Age: 68
Discharge: HOME | End: 2023-10-19
Attending: FAMILY MEDICINE
Payer: MEDICARE

## 2023-10-19 ENCOUNTER — HOSPITAL ENCOUNTER (OUTPATIENT)
Dept: RADIOLOGY | Facility: HOSPITAL | Age: 68
Discharge: HOME | End: 2023-10-19
Attending: INTERNAL MEDICINE
Payer: MEDICARE

## 2023-10-19 DIAGNOSIS — D73.5 SPLENIC INFARCT: ICD-10-CM

## 2023-10-19 DIAGNOSIS — I72.8 SPLENIC ARTERY ANEURYSM: ICD-10-CM

## 2023-10-19 DIAGNOSIS — F17.210 NICOTINE DEPENDENCE, CIGARETTES, UNCOMPLICATED: ICD-10-CM

## 2023-10-19 LAB
CREAT SERPL-MCNC: 0.8 MG/DL (ref 0.5–1.4)
SAMPLE: NORMAL

## 2023-10-19 PROCEDURE — 74175 CTA ABDOMEN W/CONTRAST: CPT | Mod: TC,PO

## 2023-10-19 PROCEDURE — 71271 CT THORAX LUNG CANCER SCR C-: CPT | Mod: TC,PO

## 2023-10-19 PROCEDURE — 25500020 PHARM REV CODE 255: Mod: PO | Performed by: INTERNAL MEDICINE

## 2023-10-19 PROCEDURE — 82565 ASSAY OF CREATININE: CPT | Mod: PO

## 2023-10-19 RX ADMIN — IOHEXOL 100 ML: 350 INJECTION, SOLUTION INTRAVENOUS at 09:10

## 2023-10-25 ENCOUNTER — OFFICE VISIT (OUTPATIENT)
Dept: PULMONOLOGY | Facility: CLINIC | Age: 68
End: 2023-10-25
Payer: MEDICARE

## 2023-10-25 VITALS
SYSTOLIC BLOOD PRESSURE: 128 MMHG | BODY MASS INDEX: 38.92 KG/M2 | HEART RATE: 79 BPM | DIASTOLIC BLOOD PRESSURE: 76 MMHG | WEIGHT: 219.69 LBS | OXYGEN SATURATION: 97 %

## 2023-10-25 DIAGNOSIS — R93.89 ABNORMAL CT OF THE CHEST: ICD-10-CM

## 2023-10-25 DIAGNOSIS — R91.1 LUNG NODULE: Primary | ICD-10-CM

## 2023-10-25 DIAGNOSIS — J44.9 CHRONIC OBSTRUCTIVE PULMONARY DISEASE, UNSPECIFIED COPD TYPE: ICD-10-CM

## 2023-10-25 PROCEDURE — 99214 OFFICE O/P EST MOD 30 MIN: CPT | Mod: S$GLB,,, | Performed by: INTERNAL MEDICINE

## 2023-10-25 PROCEDURE — 99214 PR OFFICE/OUTPT VISIT, EST, LEVL IV, 30-39 MIN: ICD-10-PCS | Mod: S$GLB,,, | Performed by: INTERNAL MEDICINE

## 2023-10-25 NOTE — ASSESSMENT & PLAN NOTE
 Continue present medications she liked BREZTRI but insurance wouldn't cover   Will refill medications as needed.   Instructed patient to contact us with any issues concerning their medications (cost, reactions, etc.).   Have discussed with patient about inciting conditions which may exacerbate their disease.   We did discuss possible new therapies or de-escalation of therapy (if appropriate).   Asked patient if they were interested in pursuing pulmonary rehabilitation.   All questions answered   Patient instructed that they are to call if symptoms change or new issues develop prior to their next visit.

## 2023-10-25 NOTE — PROGRESS NOTES
Office Visit Note *    Patient Name: Clarita Gómez  MRN: [id number]  : 1955      Reason for visit: COPD    HPI:     3/31/2021 - 66 yo female diagnosed with COPD a few years ago (unsure of severity) currently on ICS/LABA (changed from BREO to SYMBICORT) referred here for evaluation.  Has chronic SOB, BOWERS, intermittent wheezing.  She continues to smoke, currently 1/2 PPD (smoked about 2 PPD for 40+ years).  She states that she has not been able to fully quit.  Has never had a LDSCT chest (has a brother with h/o lung cancer).  Also has a h/o anticardiolipin antibody and elevated factor VIII and has been on chronic anticoagulation (sees Dr Mayer).  ROS as below.    5/10/2021 - Here for follow up, reviewed PFT with pt (FEV1 - 30%, DLCO - 12%), walk test (showed need fpr 2 LPM) and CT scan.  We will plan to change to TRELEGY (sample given and instructed).  She got Covid vaccine (Moderna)    2021 - Here for follow up, Pt is currently stable on present medications with no recent increases in their symptoms or use of rescue medications.  Since our last visit there have been no hospitalizations or ER visits for their respiratory issues and there does not seem to be anything to suggest unrecognized exacerbations.  I have reviewed the medical regimen and re-educated the pt on the role of rescue and controlling medications.  Inhaler technique and understanding seems adequate.  The patient reports no issues with any of there medications for their COPD.  Refills will be taken care of as needed.  All questions answered.  Patient has no known corona virus exposures and has been practicing social distancing.  We have discussed the virus and precautions and all questions have been answered.  We did have a discussion about booster shots.  She had a son who had Covid back in March but she did not get it then.  Still smoking about 1/2 PPD - we discussed this.    11/15/2021 - Here for follow up, Pt is currently stable on  "present medications with no recent increases in their symptoms or use of rescue medications.  Since our last visit there have been no hospitalizations or ER visits for their respiratory issues and there does not seem to be anything to suggest unrecognized exacerbations.  I have reviewed the medical regimen and re-educated the pt on the role of rescue and controlling medications.  Inhaler technique and understanding seems adequate.  The patient reports no issues with any of there medications for their COPD.  Refills will be taken care of as needed.  All questions answered.  Doesn't feel that TRELEGY "holds her" all day and wants to try BREZTRI to see if twice daily dosing helps.  Still smoking 10 cigarettes per day (we discussed this).  Has some dysphagia and is to see her PCP.  Patient has no known corona virus exposures and has been practicing social distancing.  We have discussed the virus and precautions and all questions have been answered.  Has had Covid vaccine (Moderna and plans to get booster)    8/31/2022 - Here for follow up, she felt that she did better on BREZTRI but insurance wouldn't cover and she is back on TRELEGY  but doesn't feel that she is "covered" for the whole day.  Pt is currently stable on present medications with no recent increases in their symptoms or use of rescue medications.  Since our last visit there have been no hospitalizations or ER visits for their respiratory issues and there does not seem to be anything to suggest unrecognized exacerbations.  I have reviewed the medical regimen and re-educated the pt on the role of rescue and controlling medications.  Inhaler technique and understanding seems adequate.  The patient reports no issues with any of there medications for their COPD.  Refills will be taken care of as needed.  All questions answered.  Still smoking about 10 cigarettes per day, did try to stop but didn't tolerate it.   Dysphagia is about the same - choking on stringy " meats (we discussed this) and I have recommended that she see a GI MD.  Patient has no known corona virus exposures and has been practicing social distancing.  We have discussed the virus and precautions and all questions have been answered.  She has had Covid vaccine and 2 boosters    1/26/2023 - Here for follow up, Pt is currently stable on present medications with no recent increases in their symptoms or use of rescue medications.  Since our last visit there have been no hospitalizations or ER visits for their respiratory issues and there does not seem to be anything to suggest unrecognized exacerbations.  I have reviewed the medical regimen and re-educated the pt on the role of rescue and controlling medications.  Inhaler technique and understanding seems adequate.  The patient reports no issues with any of there medications for their COPD.  Refills will be taken care of as needed.  All questions answered.  Still about 10 cigarettes per day and I have encouraged her to continue to try and stop.  She had 2nd shingles vaccine and has significant swelling and pain in her left arm (we discussed this).  She had Covid mid December and had symptoms for about 2 weeks and was treated with paxlovid.    4/27/2023 - Here for follow up, Pt is currently stable on present medications with no recent increases in their symptoms or use of rescue medications.  Since our last visit there have been no hospitalizations or ER visits for their respiratory issues and there does not seem to be anything to suggest unrecognized exacerbations.  I have reviewed the medical regimen and re-educated the pt on the role of rescue and controlling medications.  Inhaler technique and understanding seems adequate.  The patient reports no issues with any of there medications for their COPD.  Refills will be taken care of as needed.  All questions answered.  Still smoking about 1/2 PPD and has been trying to quit but not much luck yet.  No recent  issues with Covid.    7/27/2023 - here for follow up, Pt is currently stable on present medications with no recent increases in their symptoms or use of rescue medications.  Since our last visit there have been no hospitalizations or ER visits for their respiratory issues and there does not seem to be anything to suggest unrecognized exacerbations.  I have reviewed the medical regimen and re-educated the pt on the role of rescue and controlling medications.  Inhaler technique and understanding seems adequate.  The patient reports no issues with any of there medications for their COPD.  Refills will be taken care of as needed.  All questions answered.  But has noted some increased issues with allergies and congestion with the current heat.  Still smoking about 10 cigarettes/d (improved from about 2 PPD) - we discussed this.  Last ferritin is low and Dr Mayer is addressing this.    10/25/2023 - Overall sable but here to review recent CT scan.  We reviewed the images and will plan a repeat CT in 3 months.  All questions answered.  No new respiratory complaints.      COPD Flowsheet    GOLD III D   Last PFT - 4/21  FEV1- 30 % DLCO - 12 %     + ICS/LABA/LAMA    + prn JAYLA    mMRC -  0 - SOB with strenuous exercise   -  1 - SOB level ground, slight hill   -  2 - SOB walk slower or stop for breath level ground   - + 3 - SOB at 100 yards or after few minutes   -  4 - SOB in house, dressing    Referral to PULMONARY REHABILITATION -  NO    Vitamin D tested - no    Testosterone checked (male) - na    Tested for alpha-1-antitrypsin - NO      Low Dose Screening CT Chest    Cigarettes - 2 PPD x 40 YEARS  Still smoking -  YES  QUIT no    Date of last LD CT - 4/21    Result - scarring - needs repeat now    I have discussed with pt about using a screening CT of the chest due to history of cigarette smoking.  We have discussed the possible findings and possible actions as a result of these findings.  The pt would like to  proceed.    Vaccination Status    Flu vaccination status - 22/23    Pneumonia vaccination status - +    Covid vaccination status - + (Moderna) and booster x 2    Tetanus/diptheria vaccination status - unsure    Shingles vaccination status - No    Home Oxygen Qualification    Qualifier - O2 sat 87 on room air  (,exertion    Date - 4/2021          O2 sat 94% on 2 LPM via nasal cannuli    DX - COPD    Face to face visit with pt concerning the need for O2 therapy, all questions answered.  I stressed the need for compliance.  Pt to call with any questions.              Past Medical History    Past Medical History:   Diagnosis Date    Anticardiolipin antibody positive     COPD (chronic obstructive pulmonary disease)     Hyperlipidemia     Hypothyroid     Personal history of tobacco use, presenting hazards to health     Splenic infarct        Past Surgical History    Past Surgical History:   Procedure Laterality Date    CHOLECYSTECTOMY      COLECTOMY      partial due to diverticulosis    HYSTERECTOMY         Medications      Current Outpatient Medications:     acetaminophen (TYLENOL) 500 MG tablet, Take 500 mg by mouth every 6 (six) hours as needed for Pain., Disp: , Rfl:     albuterol (PROVENTIL/VENTOLIN HFA) 90 mcg/actuation inhaler, Inhale 2 puffs into the lungs every 4 (four) hours as needed for Wheezing. Rescue, Disp: 8.5 g, Rfl: 11    cyanocobalamin 1,000 mcg/mL injection, Inject 1 ml B 12 subq q 2 weeks., Disp: 6 mL, Rfl: 4    diclofenac sodium (VOLTAREN) 1 % Gel, , Disp: , Rfl:     ELIQUIS 5 mg Tab, TAKE ONE TABLET BY MOUTH TWO TIMES A DAY, Disp: 60 tablet, Rfl: 7    fluticasone-umeclidin-vilanter (TRELEGY ELLIPTA) 100-62.5-25 mcg DsDv, INHALE ONE PUFF INTO THE LUNGS ONCE DAILY, Disp: 60 each, Rfl: 11    levothyroxine (SYNTHROID) 112 MCG tablet, levothyroxine 112 mcg tablet  Take 1 tablet every day by oral route., Disp: , Rfl:     nystatin (MYCOSTATIN) 100,000 unit/mL suspension, nystatin 100,000 unit/mL oral  "suspension, Disp: , Rfl:     pomegranate xt/pomegranat seed (POMEGRANATE ORAL), Take by mouth., Disp: , Rfl:     pravastatin (PRAVACHOL) 40 MG tablet, pravastatin 40 mg tablet  Take 1 tablet every day by oral route for 90 days., Disp: , Rfl:     prenatal 105-iron-folic ac-dha 30 mg iron- 1.4 mg-300 mg Cmpk, Take by mouth., Disp: , Rfl:     albuterol (PROVENTIL) 2.5 mg /3 mL (0.083 %) nebulizer solution, Take 2.5 mg by nebulization every 6 (six) hours as needed for Wheezing. Rescue, Disp: , Rfl:     ALBUTEROL INHL, Inhale into the lungs., Disp: , Rfl:     baclofen (LIORESAL) 10 MG tablet, baclofen 10 mg tablet  TAKE 1 TABLET BY MOUTH THREE TIMES DAILY, Disp: , Rfl:     budesonide-glycopyr-formoterol (BREZTRI AEROSPHERE) 160-9-4.8 mcg/actuation HFAA, Inhale 2 puffs into the lungs 2 (two) times a day. (Patient not taking: Reported on 10/25/2023), Disp: 10.7 g, Rfl: 5    ergocalciferol (ERGOCALCIFEROL) 50,000 unit Cap, Take 50,000 Units by mouth every 7 days., Disp: , Rfl:     syringe with needle (TUBERCULIN SYRINGE) 1 mL 27 x 1/2" Syrg, Use for B 12 subq monthly injections., Disp: 6 each, Rfl: 4    syringe with needle, safety 3 mL 25 gauge x 5/8" Syrg, 1 Syringe by Misc.(Non-Drug; Combo Route) route every 30 days., Disp: 3 each, Rfl: 4    Allergies    Review of patient's allergies indicates:   Allergen Reactions    Zosyn [piperacillin-tazobactam] Swelling     Swelling to lips       SocHx    Social History     Tobacco Use   Smoking Status Every Day    Current packs/day: 0.50    Average packs/day: 0.5 packs/day for 40.0 years (20.0 ttl pk-yrs)    Types: Cigarettes   Smokeless Tobacco Never   Tobacco Comments    currently smoking 1/2 PPD    Smokes 10 cigarettes daily       Social History     Substance and Sexual Activity   Alcohol Use No       Drug Use - no  Occupation - retired   Asbestos exposure - no  Pets - na    FMHx    Family History   Problem Relation Age of Onset    Breast cancer Maternal Aunt     Heart " disease Father     Ovarian cancer Sister     Lung cancer Brother          Review of Systems  Review of Systems   Constitutional:  Positive for malaise/fatigue. Negative for chills, diaphoresis, fever and weight loss.   HENT:  Negative for congestion, ear discharge, ear pain, hearing loss, nosebleeds, sinus pain, sore throat and tinnitus.    Eyes:  Negative for pain.   Respiratory:  Positive for cough, shortness of breath and wheezing. Negative for hemoptysis, sputum production and stridor.    Cardiovascular:  Positive for chest pain (intermittent sharp pain) and leg swelling (later in day). Negative for palpitations, orthopnea, claudication and PND.   Gastrointestinal:  Negative for abdominal pain, blood in stool, constipation, diarrhea, heartburn, melena, nausea and vomiting.   Genitourinary:  Negative for dysuria, frequency, hematuria and urgency.   Musculoskeletal:  Positive for back pain. Negative for falls and myalgias.   Neurological:  Positive for weakness. Negative for dizziness, tingling, tremors, sensory change, speech change, focal weakness, seizures, loss of consciousness and headaches.   Psychiatric/Behavioral:  Negative for depression, substance abuse and suicidal ideas. The patient is not nervous/anxious.        Physical Exam    Vitals:    10/25/23 0902   BP: 128/76   BP Location: Left arm   Patient Position: Sitting   BP Method: Medium (Manual)   Pulse: 79   SpO2: 97%   Weight: 99.7 kg (219 lb 11.2 oz)       Physical Exam  Vitals and nursing note reviewed.   Constitutional:       General: She is not in acute distress.     Appearance: Normal appearance. She is well-developed. She is not ill-appearing, toxic-appearing or diaphoretic.   HENT:      Head: Normocephalic and atraumatic.      Right Ear: External ear normal.      Left Ear: External ear normal.      Nose: Nose normal.   Eyes:      General: No scleral icterus.        Right eye: No discharge.         Left eye: No discharge.       Conjunctiva/sclera: Conjunctivae normal.      Pupils: Pupils are equal, round, and reactive to light.   Neck:      Thyroid: No thyromegaly.      Vascular: No carotid bruit or JVD.      Trachea: No tracheal deviation.   Cardiovascular:      Rate and Rhythm: Regular rhythm. Tachycardia present.      Pulses: Normal pulses.      Heart sounds: Normal heart sounds. No murmur heard.     No friction rub. No gallop.   Pulmonary:      Effort: Pulmonary effort is normal. No respiratory distress.      Breath sounds: No stridor. Wheezing (few expiratory wheezes) present. No rhonchi or rales.      Comments: Decreased BS throughout  Chest:      Chest wall: No tenderness.   Abdominal:      General: Bowel sounds are normal. There is no distension.      Palpations: Abdomen is soft.      Tenderness: There is no abdominal tenderness. There is no guarding.   Musculoskeletal:         General: No tenderness. Normal range of motion.      Cervical back: Normal range of motion and neck supple. No rigidity or tenderness.      Right lower leg: No edema.      Left lower leg: No edema.   Lymphadenopathy:      Cervical: No cervical adenopathy.   Skin:     General: Skin is warm and dry.   Neurological:      General: No focal deficit present.      Mental Status: She is alert and oriented to person, place, and time. Mental status is at baseline.      Cranial Nerves: No cranial nerve deficit.   Psychiatric:         Mood and Affect: Mood normal.         Behavior: Behavior normal.         Thought Content: Thought content normal.         Judgment: Judgment normal.         Labs    Lab Results   Component Value Date    WBC 10.84 07/20/2023    HGB 13.9 07/20/2023    HCT 43.4 07/20/2023     07/20/2023       Sodium   Date Value Ref Range Status   01/18/2023 137 136 - 145 mmol/L Final   04/11/2019 136 134 - 144 mmol/L      Potassium   Date Value Ref Range Status   01/18/2023 4.1 3.5 - 5.1 mmol/L Final     Chloride   Date Value Ref Range Status    01/18/2023 105 95 - 110 mmol/L Final   04/11/2019 97 (L) 98 - 110 mmol/L      CO2   Date Value Ref Range Status   01/18/2023 23 23 - 29 mmol/L Final     Glucose   Date Value Ref Range Status   01/18/2023 99 70 - 110 mg/dL Final   04/11/2019 87 70 - 99 mg/dL      BUN   Date Value Ref Range Status   01/18/2023 12 8 - 23 mg/dL Final     Creatinine   Date Value Ref Range Status   01/18/2023 0.9 0.5 - 1.4 mg/dL Final   04/11/2019 0.86 0.60 - 1.40 mg/dL      Calcium   Date Value Ref Range Status   01/18/2023 9.2 8.7 - 10.5 mg/dL Final     Total Protein   Date Value Ref Range Status   01/18/2023 7.3 6.0 - 8.4 g/dL Final     Albumin   Date Value Ref Range Status   01/18/2023 4.0 3.5 - 5.2 g/dL Final   04/11/2019 4.4 3.1 - 4.7 g/dL      Total Bilirubin   Date Value Ref Range Status   01/18/2023 0.1 0.1 - 1.0 mg/dL Final     Comment:     For infants and newborns, interpretation of results should be based  on gestational age, weight and in agreement with clinical  observations.    Premature Infant recommended reference ranges:  Up to 24 hours.............<8.0 mg/dL  Up to 48 hours............<12.0 mg/dL  3-5 days..................<15.0 mg/dL  6-29 days.................<15.0 mg/dL       Alkaline Phosphatase   Date Value Ref Range Status   01/18/2023 50 (L) 55 - 135 U/L Final     AST   Date Value Ref Range Status   01/18/2023 23 10 - 40 U/L Final     ALT   Date Value Ref Range Status   01/18/2023 17 10 - 44 U/L Final     Anion Gap   Date Value Ref Range Status   01/18/2023 9 8 - 16 mmol/L Final       Xrays    CT chest (10/19/23)  Emphysematous changes with new noncalcified nodules in the right upper lobe the largest measuring 7 x 6 mm.  Atelectasis in the lingula and right middle lobe  Stable faint tiny subpleural peripheral groundglass nodular opacities in the upper lobes  LUNG-RADS CATEGORY 4A: SUSPICIOUS FINDINGS  RECOMMENDATION: 3 month follow-up LDCT (PET/CT may be considered when solid component greater than 7 mm is  present    Impression/Plan    Problem List Items Addressed This Visit          Pulmonary    COPD (chronic obstructive pulmonary disease)     Continue present medications she liked BREZTRI but insurance wouldn't cover  Will refill medications as needed.  Instructed patient to contact us with any issues concerning their medications (cost, reactions, etc.).  Have discussed with patient about inciting conditions which may exacerbate their disease.  We did discuss possible new therapies or de-escalation of therapy (if appropriate).  Asked patient if they were interested in pursuing pulmonary rehabilitation.  All questions answered  Patient instructed that they are to call if symptoms change or new issues develop prior to their next visit.              Other    Abnormal CT of the chest     Needs follow up CT in 3 months  RTC late January          Other Visit Diagnoses       Lung nodule    -  Primary    Relevant Orders    CT Chest Without Contrast                      Alexys Jackson MD

## 2023-10-26 PROCEDURE — G0008 FLU VACCINE - QUADRIVALENT - ADJUVANTED: ICD-10-PCS | Mod: S$GLB,,, | Performed by: INTERNAL MEDICINE

## 2023-10-26 PROCEDURE — 90694 FLU VACCINE - QUADRIVALENT - ADJUVANTED: ICD-10-PCS | Mod: S$GLB,,, | Performed by: INTERNAL MEDICINE

## 2023-10-26 PROCEDURE — 90694 VACC AIIV4 NO PRSRV 0.5ML IM: CPT | Mod: S$GLB,,, | Performed by: INTERNAL MEDICINE

## 2023-10-26 PROCEDURE — G0008 ADMIN INFLUENZA VIRUS VAC: HCPCS | Mod: S$GLB,,, | Performed by: INTERNAL MEDICINE

## 2023-11-01 ENCOUNTER — TELEPHONE (OUTPATIENT)
Dept: HEMATOLOGY/ONCOLOGY | Facility: CLINIC | Age: 68
End: 2023-11-01

## 2023-11-01 DIAGNOSIS — E53.8 B12 DEFICIENCY: Primary | ICD-10-CM

## 2023-11-01 DIAGNOSIS — D50.0 IRON DEFICIENCY ANEMIA DUE TO CHRONIC BLOOD LOSS: ICD-10-CM

## 2023-11-02 ENCOUNTER — TELEPHONE (OUTPATIENT)
Dept: HEMATOLOGY/ONCOLOGY | Facility: CLINIC | Age: 68
End: 2023-11-02

## 2023-11-02 ENCOUNTER — LAB VISIT (OUTPATIENT)
Dept: LAB | Facility: HOSPITAL | Age: 68
End: 2023-11-02
Attending: INTERNAL MEDICINE
Payer: MEDICARE

## 2023-11-02 DIAGNOSIS — E53.8 B12 DEFICIENCY: Primary | ICD-10-CM

## 2023-11-02 DIAGNOSIS — D50.0 IRON DEFICIENCY ANEMIA DUE TO CHRONIC BLOOD LOSS: ICD-10-CM

## 2023-11-02 DIAGNOSIS — E53.8 B12 DEFICIENCY: ICD-10-CM

## 2023-11-02 LAB
ALBUMIN SERPL BCP-MCNC: 4.2 G/DL (ref 3.5–5.2)
ALP SERPL-CCNC: 47 U/L (ref 55–135)
ALT SERPL W/O P-5'-P-CCNC: 16 U/L (ref 10–44)
ANION GAP SERPL CALC-SCNC: 5 MMOL/L (ref 8–16)
AST SERPL-CCNC: 19 U/L (ref 10–40)
BASOPHILS # BLD AUTO: 0.06 K/UL (ref 0–0.2)
BASOPHILS NFR BLD: 0.7 % (ref 0–1.9)
BILIRUB SERPL-MCNC: 0.3 MG/DL (ref 0.1–1)
BUN SERPL-MCNC: 12 MG/DL (ref 8–23)
CALCIUM SERPL-MCNC: 9.8 MG/DL (ref 8.7–10.5)
CHLORIDE SERPL-SCNC: 106 MMOL/L (ref 95–110)
CO2 SERPL-SCNC: 28 MMOL/L (ref 23–29)
CREAT SERPL-MCNC: 0.8 MG/DL (ref 0.5–1.4)
DIFFERENTIAL METHOD: NORMAL
EOSINOPHIL # BLD AUTO: 0.3 K/UL (ref 0–0.5)
EOSINOPHIL NFR BLD: 3 % (ref 0–8)
ERYTHROCYTE [DISTWIDTH] IN BLOOD BY AUTOMATED COUNT: 13.4 % (ref 11.5–14.5)
EST. GFR  (NO RACE VARIABLE): >60 ML/MIN/1.73 M^2
FERRITIN SERPL-MCNC: 36.2 NG/ML (ref 20–300)
GLUCOSE SERPL-MCNC: 88 MG/DL (ref 70–110)
HCT VFR BLD AUTO: 44.2 % (ref 37–48.5)
HGB BLD-MCNC: 14.4 G/DL (ref 12–16)
IMM GRANULOCYTES # BLD AUTO: 0.02 K/UL (ref 0–0.04)
IMM GRANULOCYTES NFR BLD AUTO: 0.2 % (ref 0–0.5)
LYMPHOCYTES # BLD AUTO: 2.4 K/UL (ref 1–4.8)
LYMPHOCYTES NFR BLD: 26.6 % (ref 18–48)
MCH RBC QN AUTO: 30.3 PG (ref 27–31)
MCHC RBC AUTO-ENTMCNC: 32.6 G/DL (ref 32–36)
MCV RBC AUTO: 93 FL (ref 82–98)
MONOCYTES # BLD AUTO: 0.9 K/UL (ref 0.3–1)
MONOCYTES NFR BLD: 9.6 % (ref 4–15)
NEUTROPHILS # BLD AUTO: 5.5 K/UL (ref 1.8–7.7)
NEUTROPHILS NFR BLD: 59.9 % (ref 38–73)
NRBC BLD-RTO: 0 /100 WBC
PLATELET # BLD AUTO: 279 K/UL (ref 150–450)
PMV BLD AUTO: 9.5 FL (ref 9.2–12.9)
POTASSIUM SERPL-SCNC: 4.2 MMOL/L (ref 3.5–5.1)
PROT SERPL-MCNC: 7.5 G/DL (ref 6–8.4)
RBC # BLD AUTO: 4.76 M/UL (ref 4–5.4)
SODIUM SERPL-SCNC: 139 MMOL/L (ref 136–145)
VIT B12 SERPL-MCNC: 353 PG/ML (ref 210–950)
WBC # BLD AUTO: 9.15 K/UL (ref 3.9–12.7)

## 2023-11-02 PROCEDURE — 82728 ASSAY OF FERRITIN: CPT | Performed by: INTERNAL MEDICINE

## 2023-11-02 PROCEDURE — 36415 COLL VENOUS BLD VENIPUNCTURE: CPT | Performed by: INTERNAL MEDICINE

## 2023-11-02 PROCEDURE — 80053 COMPREHEN METABOLIC PANEL: CPT | Performed by: INTERNAL MEDICINE

## 2023-11-02 PROCEDURE — 82607 VITAMIN B-12: CPT | Performed by: INTERNAL MEDICINE

## 2023-11-02 PROCEDURE — 85025 COMPLETE CBC W/AUTO DIFF WBC: CPT | Performed by: INTERNAL MEDICINE

## 2023-11-06 ENCOUNTER — OFFICE VISIT (OUTPATIENT)
Dept: HEMATOLOGY/ONCOLOGY | Facility: CLINIC | Age: 68
End: 2023-11-06
Payer: MEDICARE

## 2023-11-06 VITALS
HEART RATE: 90 BPM | WEIGHT: 217.69 LBS | TEMPERATURE: 98 F | SYSTOLIC BLOOD PRESSURE: 169 MMHG | DIASTOLIC BLOOD PRESSURE: 80 MMHG | BODY MASS INDEX: 38.56 KG/M2 | RESPIRATION RATE: 16 BRPM

## 2023-11-06 DIAGNOSIS — D50.0 IRON DEFICIENCY ANEMIA DUE TO CHRONIC BLOOD LOSS: Primary | ICD-10-CM

## 2023-11-06 DIAGNOSIS — E53.8 B12 DEFICIENCY: ICD-10-CM

## 2023-11-06 PROCEDURE — 99214 OFFICE O/P EST MOD 30 MIN: CPT | Mod: S$GLB,,, | Performed by: INTERNAL MEDICINE

## 2023-11-06 PROCEDURE — 99214 PR OFFICE/OUTPT VISIT, EST, LEVL IV, 30-39 MIN: ICD-10-PCS | Mod: S$GLB,,, | Performed by: INTERNAL MEDICINE

## 2023-11-07 NOTE — PROGRESS NOTES
[facility] hematology Oncology in office subsequent encounter note  11/6/23  Bassam Givens MD, Hazel Tavera        Presented with LUQ pain.  Had splenic artery thrombus with splenic infarct.  On Eliquis 5mg BID.  No LUQ pain sx now.    Coagulation evaluation increased Factor 8 and increased Anti cardiolipid IgM antibody levels.  Persistently increased on repeat determination.    Estimate 30% risk of clot event over next 2-3 years.    Continue Eliquis 5mg BID therapeutic and prophylaxis.    She admits to blurred vision and dizzyness intermittently, sx predated the splenic infarct and have not changed while on Eliquis.    History of COPD.  On 02 24/7.    Re check her CAT of abdomen  regards status of splenic infarct showed resolution of splenic infarct area. 2019.    Continue Eliquis BID.      She is seen nurse practitioner Gabriel for general care and for thyroid management.     She saw her cardiologist, Dr. Oliva, due for chemical stress test.    Her son had COVID, but was asymptomatic.  She did not have symptoms and isolated herself.    She has had 2 of 2 vaccines and the booster shot and the flu shot.  She received the pneumonia vaccination 2 years ago.  She has not taken the shingles vaccine.    She has COPD and is on oxygen 2 liters/minute 24/7.     No acute distress, does not appear chronically ill.  She does not have palpable lymphadenopathy.  Her lungs are clear bilaterally.  She has regular rhythm without murmur.  Abdomen is flat without distention, abdomen is nontender without palpable hepatomegaly or splenomegaly or abdominal mass.  Calves are nontender without edema, no petechiae or purpura.  Neurologically grossly intact    History of splenic infarct with  Increased factor 8 at 250 and increased anticardiolipin antibody at 13.  Now.    Continue Eliquis 5 mg b.i.d..  Return to clinic in 6 months.    H/H 6/20  Ferritin 5  B 12 69    Gave Ferrlicet x's 16  weeks.  Energy 2/10 to 10/10.  B 12  weekly x's 4 then monthly.  Stool for occult blood.  Stool heme negative x's 6.    Hgb 7 to 12 to 14.5 to 13.9.  Ferritin 5 to 108. To 10.  B 12  551, to 315, increase  B 12 monthly. To BID.    Fe deficiency anemia, B 12 deficiency anemia.  Schedule Injectafer x's 1 to increase Ferritin.  Check stool for heme.    CT of chest screening, 6-7 mm nodules seen, follow up in 3 months.  CT of abdomin spleen NL, splenic aneurism unchanged at 8 mm, stable, since 2019.    Hgb 14.4, B 12 315-353.  Stool heme + 2/3 specimens.  Refer to Dr. Murphy for GI eval.    Will need the date of the procedure, to get her off Eliquis and cover with lovenox bridge x's 2 days.    Check lab 3 months.  See me 2/2024.

## 2023-11-21 DIAGNOSIS — R91.8 OTHER NONSPECIFIC ABNORMAL FINDING OF LUNG FIELD: Primary | ICD-10-CM

## 2023-12-12 ENCOUNTER — TELEPHONE (OUTPATIENT)
Dept: HEMATOLOGY/ONCOLOGY | Facility: CLINIC | Age: 68
End: 2023-12-12

## 2023-12-12 NOTE — TELEPHONE ENCOUNTER
Left message for Dr. Linda COLON to call back to give us a date for patient's colonoscopy. Left vmail for them to call us back with date so we can bridge patient.  [phone].

## 2023-12-14 ENCOUNTER — TELEPHONE (OUTPATIENT)
Dept: HEMATOLOGY/ONCOLOGY | Facility: CLINIC | Age: 68
End: 2023-12-14

## 2023-12-14 NOTE — TELEPHONE ENCOUNTER
Marlin from Dr. Adames's office called to say that they need approval from Dr. Mayer that he will bridge her with Lovenox for her upcoming colonoscopy. Advised he would but that he won't give dates or call in Lovenox until we have date of procedure. Verbalized understanding. Said she would reach out to schedulers and let us know.  Called patient and informed her to call us with the date of colonoscopy and then we can go over bridging instructions. Pt verbalized understanding.

## 2023-12-15 ENCOUNTER — TELEPHONE (OUTPATIENT)
Dept: HEMATOLOGY/ONCOLOGY | Facility: CLINIC | Age: 68
End: 2023-12-15

## 2023-12-15 NOTE — TELEPHONE ENCOUNTER
Pt called to inform us that her colonoscopy with Dr. Adames is scheduled for 12/28/23. Advised would talk with Dr. Mayer and call back with [facility] bridging instructions. Pt verbalized understanding.

## 2023-12-22 ENCOUNTER — HOSPITAL ENCOUNTER (OUTPATIENT)
Dept: PREADMISSION TESTING | Facility: HOSPITAL | Age: 68
Discharge: HOME | End: 2023-12-22
Attending: INTERNAL MEDICINE
Payer: MEDICARE

## 2023-12-22 VITALS
TEMPERATURE: 98 F | SYSTOLIC BLOOD PRESSURE: 120 MMHG | WEIGHT: 220 LBS | HEART RATE: 79 BPM | HEIGHT: 64 IN | RESPIRATION RATE: 20 BRPM | DIASTOLIC BLOOD PRESSURE: 81 MMHG | BODY MASS INDEX: 37.56 KG/M2 | OXYGEN SATURATION: 94 %

## 2023-12-22 DIAGNOSIS — Z01.818 PRE-OP TESTING: Primary | ICD-10-CM

## 2023-12-22 PROCEDURE — 93010 ELECTROCARDIOGRAM REPORT: CPT | Mod: ,,, | Performed by: GENERAL PRACTICE

## 2023-12-22 PROCEDURE — 93010 EKG 12-LEAD: ICD-10-PCS | Mod: ,,, | Performed by: GENERAL PRACTICE

## 2023-12-22 PROCEDURE — 93005 ELECTROCARDIOGRAM TRACING: CPT | Performed by: GENERAL PRACTICE

## 2023-12-22 NOTE — DISCHARGE INSTRUCTIONS
To confirm, Your procedure is scheduled for:  Thursday, December 28, 2023    Endoscopy will call the afternoon prior with the final arrival time on Wednesday, December 27, 2023    Please report to [facility] [place] via [facility] entrance. Check in at registration desk.    Do not eat or drink anything after midnight the night before procedure.      TAKE ONLY THESE MEDICATIONS WITH A SMALL SIP OF WATER THE MORNING OF YOUR PROCEDURE:  LEVOTHYROXINE / INHALERS      DO NOT TAKE THESE MEDICATIONS PRIOR to your procedure or per your surgeon's request: ASPIRIN, ALEVE, ADVIL, IBUPROFEN, FISH OIL VITAMIN E, HERBALS  (May take Tylenol)    ONLY if you are prescribed any types of blood thinners such as:  Aspirin, Coumadin, Plavix, Pradaxa, Xarelto, Aggrenox, Effient, Eliquis, Savasya, Brilinta, or any other, ask your surgeon whether you should stop taking them and how long before surgery you should stop.  You may also need to verify with the prescribing physician if it is ok to stop your medication.      INSTRUCTIONS IMPORTANT!!    Do not smoke, vape or drink alcoholic beverages 24 hours prior to your procedure.     Please leave all jewelry, piercing's and valuables at home.    ONLY if you wear home oxygen please bring your portable oxygen tank the day of your procedure.     ONLY for patients requiring bowel prep, written instructions will be given by your doctor's office.    Make arrangements in advance for transportation home by a responsible adult.    You must make arrangements for transportation, TAXI'S, UBER'S OR LYFTS ARE NOT ALLOWED.        If you have any questions about these instructions, call Pre-Op Admit  Nursing at [phone] or the Pre-Op Endoscopy [phone]

## 2023-12-28 ENCOUNTER — HOSPITAL ENCOUNTER (OUTPATIENT)
Facility: HOSPITAL | Age: 68
Discharge: HOME | End: 2023-12-28
Attending: INTERNAL MEDICINE | Admitting: INTERNAL MEDICINE
Payer: MEDICARE

## 2023-12-28 ENCOUNTER — ANESTHESIA EVENT (OUTPATIENT)
Dept: SURGERY | Facility: HOSPITAL | Age: 68
End: 2023-12-28
Payer: MEDICARE

## 2023-12-28 ENCOUNTER — ANESTHESIA (OUTPATIENT)
Dept: SURGERY | Facility: HOSPITAL | Age: 68
End: 2023-12-28
Payer: MEDICARE

## 2023-12-28 VITALS
RESPIRATION RATE: 12 BRPM | OXYGEN SATURATION: 100 % | DIASTOLIC BLOOD PRESSURE: 76 MMHG | SYSTOLIC BLOOD PRESSURE: 100 MMHG | OXYGEN SATURATION: 100 % | TEMPERATURE: 98 F | RESPIRATION RATE: 18 BRPM | HEART RATE: 72 BPM | SYSTOLIC BLOOD PRESSURE: 156 MMHG | DIASTOLIC BLOOD PRESSURE: 60 MMHG | HEART RATE: 71 BPM

## 2023-12-28 DIAGNOSIS — D64.9 ANEMIA: ICD-10-CM

## 2023-12-28 PROCEDURE — D9220A PRA ANESTHESIA: ICD-10-PCS | Mod: ,,, | Performed by: ANESTHESIOLOGY

## 2023-12-28 PROCEDURE — 27201114 HC TRAP (ANY): Performed by: INTERNAL MEDICINE

## 2023-12-28 PROCEDURE — 27202049 HC PROBE, APC ERBE: Performed by: INTERNAL MEDICINE

## 2023-12-28 PROCEDURE — 37000009 HC ANESTHESIA EA ADD 15 MINS: Performed by: INTERNAL MEDICINE

## 2023-12-28 PROCEDURE — 25000003 PHARM REV CODE 250: Performed by: INTERNAL MEDICINE

## 2023-12-28 PROCEDURE — D9220A PRA ANESTHESIA: Mod: ,,, | Performed by: ANESTHESIOLOGY

## 2023-12-28 PROCEDURE — 63600175 PHARM REV CODE 636 W HCPCS: Performed by: NURSE ANESTHETIST, CERTIFIED REGISTERED

## 2023-12-28 PROCEDURE — 25000003 PHARM REV CODE 250: Performed by: NURSE ANESTHETIST, CERTIFIED REGISTERED

## 2023-12-28 PROCEDURE — 37000008 HC ANESTHESIA 1ST 15 MINUTES: Performed by: INTERNAL MEDICINE

## 2023-12-28 PROCEDURE — 45385 COLONOSCOPY W/LESION REMOVAL: CPT | Performed by: INTERNAL MEDICINE

## 2023-12-28 PROCEDURE — 45388 COLONOSCOPY W/ABLATION: CPT | Performed by: INTERNAL MEDICINE

## 2023-12-28 PROCEDURE — 88305 TISSUE EXAM BY PATHOLOGIST: CPT | Mod: TC,59 | Performed by: PATHOLOGY

## 2023-12-28 PROCEDURE — 27201089 HC SNARE, DISP (ANY): Performed by: INTERNAL MEDICINE

## 2023-12-28 PROCEDURE — 43450 DILATE ESOPHAGUS 1/MULT PASS: CPT | Performed by: INTERNAL MEDICINE

## 2023-12-28 PROCEDURE — 44378 SMALL BOWEL ENDOSCOPY: CPT | Performed by: INTERNAL MEDICINE

## 2023-12-28 RX ORDER — DEXTROMETHORPHAN/PSEUDOEPHED 2.5-7.5/.8
DROPS ORAL
Status: DISCONTINUED | OUTPATIENT
Start: 2023-12-28 | End: 2023-12-28 | Stop reason: HOSPADM

## 2023-12-28 RX ORDER — PROPOFOL 10 MG/ML
VIAL (ML) INTRAVENOUS
Status: DISCONTINUED | OUTPATIENT
Start: 2023-12-28 | End: 2023-12-28

## 2023-12-28 RX ADMIN — SODIUM CHLORIDE: 900 INJECTION INTRAVENOUS at 08:12

## 2023-12-28 RX ADMIN — PROPOFOL 40 MG: 10 INJECTION, EMULSION INTRAVENOUS at 08:12

## 2023-12-28 RX ADMIN — PROPOFOL 30 MG: 10 INJECTION, EMULSION INTRAVENOUS at 09:12

## 2023-12-28 RX ADMIN — PROPOFOL 30 MG: 10 INJECTION, EMULSION INTRAVENOUS at 08:12

## 2023-12-28 RX ADMIN — PROPOFOL 100 MG: 10 INJECTION, EMULSION INTRAVENOUS at 08:12

## 2023-12-28 NOTE — ANESTHESIA POSTPROCEDURE EVALUATION
Anesthesia Post Evaluation    Patient: Clarita Gómez    Procedure(s) Performed: Procedure(s) (LRB):  COLONOSCOPY (N/A)  PUSH ENTEROSCOPY (N/A)    Final Anesthesia Type: general      Patient location during evaluation: GI PACU  Patient participation: Yes- Able to Participate  Level of consciousness: awake and alert  Post-procedure vital signs: reviewed and stable  Pain management: adequate  Airway patency: patent    PONV status at discharge: No PONV  Anesthetic complications: no      Cardiovascular status: stable  Respiratory status: unassisted  Hydration status: euvolemic  Follow-up not needed.              Vitals Value Taken Time   /76 12/28/23 0957   Temp 36.6 °C (97.8 °F) 12/28/23 0957   Pulse 72 12/28/23 0957   Resp 18 12/28/23 0957   SpO2 100 % 12/28/23 0957         No case tracking events are documented in the log.      Pain/Letitia Score: No data recorded

## 2023-12-28 NOTE — H&P
GASTROENTEROLOGY PRE-PROCEDURE H&P NOTE  Patient Name: Clarita Gómez  Patient MRN: [id number]  Patient : 1955    Service date: 2023    PCP: Roxanne, Primary Doctor    No chief complaint on file.      HPI: Patient is a 68 y.o. female with PMHx as below here for evaluation of   Clarita Gómez is a 67 year old female patient who is seen today for an initial visit.  Pt h/o copd on 4 liters o2 h/o diverticulitis with resection in , son h/o crc in 30s, maternal grandmother crc presenting for evaluation of chronic ele requiring iron infusions. no overt bleeding.  admits to gerd.  no heavy nsaids.  feels well overall.       Chart Reeview      PMH- Colon resection, anticardiolipin ab, ELE, splenic infarct      LABS- CBC - hgb 14.4, mcv 93 on 2023.  Ferritin 36.  -FOBT postive x 2    IMAGING  10/23 CTA8 mm splenic artery aneurysm.   .     Past Medical History:  Past Medical History:   Diagnosis Date    Anticardiolipin antibody positive     COPD (chronic obstructive pulmonary disease)     Hyperlipidemia     Hypothyroid     Personal history of tobacco use, presenting hazards to health     Splenic infarct         Past Surgical History:  Past Surgical History:   Procedure Laterality Date    CHOLECYSTECTOMY      COLECTOMY      partial due to diverticulosis    HYSTERECTOMY      LAPAROSCOPIC REPAIR OF VENTRAL HERNIA          Home Medications:  Medications Prior to Admission   Medication Sig Dispense Refill Last Dose    acetaminophen (TYLENOL) 500 MG tablet Take 500 mg by mouth every 6 (six) hours as needed for Pain.   2023    albuterol (PROVENTIL) 2.5 mg /3 mL (0.083 %) nebulizer solution Take 2.5 mg by nebulization every 6 (six) hours as needed for Wheezing. Rescue   2023    albuterol (PROVENTIL/VENTOLIN HFA) 90 mcg/actuation inhaler Inhale 2 puffs into the lungs every 4 (four) hours as needed for Wheezing. Rescue 8.5 g 11 2023    ALBUTEROL INHL Inhale into the lungs.   2023    cyanocobalamin 1,000  "mcg/mL injection Inject 1 ml B 12 subq q 2 weeks. 6 mL 4 12/27/2023    diclofenac sodium (VOLTAREN) 1 % Gel    12/27/2023    ELIQUIS 5 mg Tab TAKE ONE TABLET BY MOUTH TWO TIMES A DAY 60 tablet 7 12/27/2023 at 0900    fluticasone-umeclidin-vilanter (TRELEGY ELLIPTA) 100-62.5-25 mcg DsDv INHALE ONE PUFF INTO THE LUNGS ONCE DAILY 60 each 11 12/27/2023    levothyroxine (SYNTHROID) 112 MCG tablet levothyroxine 112 mcg tablet   Take 1 tablet every day by oral route.   12/27/2023    pravastatin (PRAVACHOL) 40 MG tablet pravastatin 40 mg tablet   Take 1 tablet every day by oral route for 90 days.   12/27/2023    prenatal 105-iron-folic ac-dha 30 mg iron- 1.4 mg-300 mg Cmpk Take by mouth.   12/27/2023    syringe with needle (TUBERCULIN SYRINGE) 1 mL 27 x 1/2" Syrg Use for B 12 subq monthly injections. 6 each 4 12/27/2023    syringe with needle, safety 3 mL 25 gauge x 5/8" Syrg 1 Syringe by Misc.(Non-Drug; Combo Route) route every 30 days. 3 each 4 12/27/2023               Review of patient's allergies indicates:   Allergen Reactions    Zosyn [piperacillin-tazobactam] Swelling     Swelling to lips       Social History:   Social History     Occupational History    Not on file   Tobacco Use    Smoking status: Every Day     Current packs/day: 0.50     Average packs/day: 0.5 packs/day for 40.0 years (20.0 ttl pk-yrs)     Types: Cigarettes    Smokeless tobacco: Never    Tobacco comments:     currently smoking 1/2 PPD     Smokes 10 cigarettes daily   Substance and Sexual Activity    Alcohol use: No    Drug use: No    Sexual activity: Not on file       Family History:   Family History   Problem Relation Age of Onset    Breast cancer Maternal Aunt     Heart disease Father     Ovarian cancer Sister     Lung cancer Brother        Review of Systems:  A 10 point review of systems was performed and was normal, except as mentioned in the HPI, including constitutional, HEENT, heme, lymph, cardiovascular, respiratory, gastrointestinal, " "genitourinary, neurologic, endocrine, psychiatric and musculoskeletal.      OBJECTIVE:    Physical Exam:  24 Hour Vital Sign Ranges: Temp:  [96 °F (35.6 °C)] 96 °F (35.6 °C)  Pulse:  [71] 71  Resp:  [20] 20  SpO2:  [100 %] 100 %  BP: (128)/(61) 128/61  Most recent vitals: /61 (BP Location: Left arm)   Pulse 71   Temp 96 °F (35.6 °C) (Temporal)   Resp 20   SpO2 100% Comment: on pom mask  Breastfeeding No    GEN: well-developed, well-nourished, awake and alert, non-toxic appearing adult  HEENT: PERRL, sclera anicteric, oral mucosa pink and moist without lesion  NECK: trachea midline; Good ROM  CV: regular rate and rhythm, no murmurs or gallops  RESP: clear to auscultation bilaterally, no wheezes, rhonci or rales  ABD: soft, non-tender, non-distended, normal bowel sounds  EXT: no swelling or edema, 2+ pulses distally  SKIN: no rashes or jaundice  PSYCH: normal affect    Labs:   No results for input(s): "WBC", "MCV", "PLT" in the last 72 hours.    Invalid input(s): "HGBAU"  No results for input(s): "NA", "K", "CL", "CO2", "BUN", "GLU" in the last 72 hours.    Invalid input(s): "CREA"  No results for input(s): "ALB" in the last 72 hours.    Invalid input(s): "ALKP", "SGOT", "SGPT", "TBIL", "DBIL", "TPRO"  No results for input(s): "PT", "INR", "PTT" in the last 72 hours.      IMPRESSION / RECOMMENDATIONS:  Pt with clotting disorder on eliquis, h/o splenic infarct, fam hx of crc with ELE  -push/colon smh  -anticoag recs   -start daily trulance 3 days prior to prep then use miralax mag citrate prep.    Push/colon  with interventions as warranted.   RIsks, benefits, alternatives discussed in detail regarding upcoming procedures and sedation. Some of the more common endoscopic complications include but not limited to immediate or delayed perforation, bleeding, infections, pain, inadvertent injury to surrounding tissue / organs and possible need for surgical evaluation. Patient expressed understanding, all questions " answered and will proceed with procedure as planned.     Magdi Adames  12/28/2023  7:15 AM

## 2023-12-28 NOTE — PROVATION PATIENT INSTRUCTIONS
Discharge Summary/Instructions after an Endoscopic Procedure  Patient Name: Clarita Gómez  Patient MRN: [id number]  Patient YOB: 1955 Thursday, December 28, 2023  Magdi Adames MD  RESTRICTIONS:  During your procedure today, you received medications for sedation.  These   medications may affect your judgment, balance and coordination.  Therefore,   for 24 hours, you have the following restrictions:   - DO NOT drive a car, operate machinery, make legal/financial decisions,   sign important papers or drink alcohol.    ACTIVITY:  Today: no heavy lifting, straining or running due to procedural   sedation/anesthesia.  The following day: return to full activity including work.  DIET:  Eat and drink normally unless instructed otherwise.     TREATMENT FOR COMMON SIDE EFFECTS:  - Mild abdominal pain, nausea, belching, bloating or excessive gas:  rest,   eat lightly and use a heating pad.  - Sore Throat: treat with throat lozenges and/or gargle with warm salt   water.  - Because air was used during the procedure, expelling large amounts of air   from your rectum or belching is normal.  - If a bowel prep was taken, you may not have a bowel movement for 1-3 days.    This is normal.  SYMPTOMS TO WATCH FOR AND REPORT TO YOUR PHYSICIAN:  1. Abdominal pain or bloating, other than gas cramps.  2. Chest pain.  3. Back pain.  4. Signs of infection such as: chills or fever occurring within 24 hours   after the procedure.  5. Rectal bleeding, which would show as bright red, maroon, or black stools.   (A tablespoon of blood from the rectum is not serious, especially if   hemorrhoids are present.)  6. Vomiting.  7. Weakness or dizziness.  GO DIRECTLY TO THE NEAREST EMERGENCY ROOM IF YOU HAVE ANY OF THE FOLLOWING:      Difficulty breathing              Chills and/or fever over 101 F   Persistent vomiting and/or vomiting blood   Severe abdominal pain   Severe chest pain   Black, tarry stools   Bleeding- more than one  tablespoon   Any other symptom or condition that you feel may need urgent attention  Your doctor recommends these additional instructions:  If any biopsies were taken, your doctors clinic will contact you in 1 to 2   weeks with any results.  - Patient has a contact number available for emergencies.  The signs and   symptoms of potential delayed complications were discussed with the   patient.  Return to normal activities tomorrow.  Written discharge   instructions were provided to the patient.   - Resume previous diet.   - Continue present medications.   - Await pathology results.   - Repeat colonoscopy in 3 years for surveillance.   - Return to GI clinic PRN.   - Resume eliquis in 36 hours.  - Discharge patient to home (with escort).  For questions, problems or results please call your physician - Magdi Adames MD at Work:  [phone].  [facility], EMERGENCY ROOM PHONE NUMBER: [phone]  IF A COMPLICATION OR EMERGENCY SITUATION ARISES AND YOU ARE UNABLE TO REACH   YOUR PHYSICIAN - GO DIRECTLY TO THE EMERGENCY ROOM.  MD Magdi Drummond MD  12/28/2023 9:24:15 AM  This report has been verified and signed electronically.  Dear patient,  As a result of recent federal legislation (The Federal Cures Act), you may   receive lab or pathology results from your procedure in your [facility]   account before your physician is able to contact you. Your physician or   their representative will relay the results to you with their   recommendations at their soonest availability.  Thank you,  PROVATION

## 2023-12-28 NOTE — TRANSFER OF CARE
Anesthesia Transfer of Care Note    Patient: Clarita Gómez    Procedure(s) Performed: Procedure(s) (LRB):  COLONOSCOPY (N/A)  PUSH ENTEROSCOPY (N/A)    Patient location: GI    Anesthesia Type: general    Transport from OR: Transported from OR on 2-3 L/min O2 by NC with adequate spontaneous ventilation    Post pain: adequate analgesia    Post assessment: no apparent anesthetic complications    Post vital signs: stable    Level of consciousness: awake    Nausea/Vomiting: no nausea/vomiting    Complications: none    Transfer of care protocol was followed      Last vitals: Visit Vitals  /61 (BP Location: Left arm)   Pulse 71   Temp 35.6 °C (96 °F) (Temporal)   Resp 20   SpO2 100%   Breastfeeding No

## 2023-12-28 NOTE — ANESTHESIA PREPROCEDURE EVALUATION
12/28/2023  Clarita Gómez is a 68 y.o., female.      Pre-op Assessment    I have reviewed the Patient Summary Reports.     I have reviewed the Nursing Notes. I have reviewed the NPO Status.   I have reviewed the Medications.     Review of Systems  Anesthesia Hx:             Denies Family Hx of Anesthesia complications.    Denies Personal Hx of Anesthesia complications.                    Social:  Smoker       Hematology/Oncology:  Hematology Normal   Oncology Normal                Hematology Comments: Patient takes Eliquis for anticardiolipin antibody and history of blood clot and splenic infarct, last dose 12/27/2023                    EENT/Dental:  EENT/Dental Normal  Full dentures          Cardiovascular:                hyperlipidemia BOWERS                            Pulmonary:   COPD (controlled with daily inhalers and home oxygen at 3 L per minute), severe                     Renal/:  Renal/ Normal                 Hepatic/GI:     GERD (patient takes Tums as needed.  No reflux today.)   Stool heme-positive.  History of colectomy (due to diverticulitis) with multiple complications afterwards.          Musculoskeletal:  Musculoskeletal Normal                Neurological:  Neurology Normal                                      Endocrine:   Hypothyroidism          Psych:  Psychiatric Normal                    Physical Exam  General: Well nourished, Cooperative, Alert and Oriented    Airway:  Mallampati: II / I  Mouth Opening: Normal  TM Distance: > 6 cm  Tongue: Normal  Neck ROM: Normal ROM    Dental:  Intact, Edentulous    Chest/Lungs:  Clear to auscultation, Normal Respiratory Rate    Heart:  Rate: Normal  Rhythm: Regular Rhythm  Sounds: Normal        Anesthesia Plan  Type of Anesthesia, risks & benefits discussed:    Anesthesia Type: Gen Natural Airway  Intra-op Monitoring Plan: Standard ASA  Monitors  Induction:  IV  Informed Consent: Informed consent signed with the Patient and all parties understand the risks and agree with anesthesia plan.  All questions answered.   ASA Score: 3    Ready For Surgery From Anesthesia Perspective.     .

## 2023-12-28 NOTE — PROVATION PATIENT INSTRUCTIONS
Discharge Summary/Instructions after an Endoscopic Procedure  Patient Name: Clarita Gómez  Patient MRN: [id number]  Patient YOB: 1955 Thursday, December 28, 2023  Magdi Adames MD  RESTRICTIONS:  During your procedure today, you received medications for sedation.  These   medications may affect your judgment, balance and coordination.  Therefore,   for 24 hours, you have the following restrictions:   - DO NOT drive a car, operate machinery, make legal/financial decisions,   sign important papers or drink alcohol.    ACTIVITY:  Today: no heavy lifting, straining or running due to procedural   sedation/anesthesia.  The following day: return to full activity including work.  DIET:  Eat and drink normally unless instructed otherwise.     TREATMENT FOR COMMON SIDE EFFECTS:  - Mild abdominal pain, nausea, belching, bloating or excessive gas:  rest,   eat lightly and use a heating pad.  - Sore Throat: treat with throat lozenges and/or gargle with warm salt   water.  - Because air was used during the procedure, expelling large amounts of air   from your rectum or belching is normal.  - If a bowel prep was taken, you may not have a bowel movement for 1-3 days.    This is normal.  SYMPTOMS TO WATCH FOR AND REPORT TO YOUR PHYSICIAN:  1. Abdominal pain or bloating, other than gas cramps.  2. Chest pain.  3. Back pain.  4. Signs of infection such as: chills or fever occurring within 24 hours   after the procedure.  5. Rectal bleeding, which would show as bright red, maroon, or black stools.   (A tablespoon of blood from the rectum is not serious, especially if   hemorrhoids are present.)  6. Vomiting.  7. Weakness or dizziness.  GO DIRECTLY TO THE NEAREST EMERGENCY ROOM IF YOU HAVE ANY OF THE FOLLOWING:      Difficulty breathing              Chills and/or fever over 101 F   Persistent vomiting and/or vomiting blood   Severe abdominal pain   Severe chest pain   Black, tarry stools   Bleeding- more than one  tablespoon   Any other symptom or condition that you feel may need urgent attention  Your doctor recommends these additional instructions:  If any biopsies were taken, your doctors clinic will contact you in 1 to 2   weeks with any results.  - Patient has a contact number available for emergencies.  The signs and   symptoms of potential delayed complications were discussed with the   patient.  Return to normal activities tomorrow.  Written discharge   instructions were provided to the patient.   - Discharge patient to home.  For questions, problems or results please call your physician - Magdi Adames MD at Work:  [phone].  [facility], EMERGENCY ROOM PHONE NUMBER: [phone]  IF A COMPLICATION OR EMERGENCY SITUATION ARISES AND YOU ARE UNABLE TO REACH   YOUR PHYSICIAN - GO DIRECTLY TO THE EMERGENCY ROOM.  MD Magdi Drummond MD  12/28/2023 9:27:31 AM  This report has been verified and signed electronically.  Dear patient,  As a result of recent federal legislation (The Federal Cures Act), you may   receive lab or pathology results from your procedure in your [facility]   account before your physician is able to contact you. Your physician or   their representative will relay the results to you with their   recommendations at their soonest availability.  Thank you,  PROVATION

## 2024-01-19 ENCOUNTER — HOSPITAL ENCOUNTER (OUTPATIENT)
Dept: RADIOLOGY | Facility: HOSPITAL | Age: 69
Discharge: HOME | End: 2024-01-19
Attending: INTERNAL MEDICINE
Payer: MEDICARE

## 2024-01-19 DIAGNOSIS — R91.1 LUNG NODULE: ICD-10-CM

## 2024-01-19 PROCEDURE — 71250 CT THORAX DX C-: CPT | Mod: TC,PO

## 2024-01-22 DIAGNOSIS — R93.89 ABNORMAL CT OF THE CHEST: Primary | ICD-10-CM

## 2024-01-23 RX ORDER — ONDANSETRON 4 MG/1
4 TABLET, ORALLY DISINTEGRATING ORAL
COMMUNITY
Start: 2023-12-12

## 2024-01-23 RX ORDER — ENOXAPARIN SODIUM 100 MG/ML
INJECTION SUBCUTANEOUS
COMMUNITY
Start: 2023-12-15

## 2024-01-24 ENCOUNTER — OFFICE VISIT (OUTPATIENT)
Dept: PULMONOLOGY | Facility: CLINIC | Age: 69
End: 2024-01-24
Payer: MEDICARE

## 2024-01-24 VITALS
HEART RATE: 81 BPM | BODY MASS INDEX: 38.9 KG/M2 | SYSTOLIC BLOOD PRESSURE: 128 MMHG | DIASTOLIC BLOOD PRESSURE: 74 MMHG | WEIGHT: 223.13 LBS | OXYGEN SATURATION: 95 %

## 2024-01-24 DIAGNOSIS — R93.89 ABNORMAL CT OF THE CHEST: ICD-10-CM

## 2024-01-24 DIAGNOSIS — J44.9 CHRONIC OBSTRUCTIVE PULMONARY DISEASE, UNSPECIFIED COPD TYPE: Primary | ICD-10-CM

## 2024-01-24 PROCEDURE — 99214 OFFICE O/P EST MOD 30 MIN: CPT | Mod: S$GLB,,, | Performed by: INTERNAL MEDICINE

## 2024-01-24 NOTE — ASSESSMENT & PLAN NOTE
Continue present medications she liked BREZTRI but insurance wouldn't cover  Will refill medications as needed.  Instructed patient to contact us with any issues concerning their medications (cost, reactions, etc.).  Have discussed with patient about inciting conditions which may exacerbate their disease.  We did discuss possible new therapies or de-escalation of therapy (if appropriate).  Asked patient if they were interested in pursuing pulmonary rehabilitation.  All questions answered  Patient instructed that they are to call if symptoms change or new issues develop prior to their next visit.

## 2024-01-24 NOTE — PROGRESS NOTES
Office Visit Note *    Patient Name: Clarita Gómez  MRN: [id number]  : 1955      Reason for visit: COPD    HPI:     3/31/2021 - 64 yo female diagnosed with COPD a few years ago (unsure of severity) currently on ICS/LABA (changed from BREO to SYMBICORT) referred here for evaluation.  Has chronic SOB, BOWERS, intermittent wheezing.  She continues to smoke, currently 1/2 PPD (smoked about 2 PPD for 40+ years).  She states that she has not been able to fully quit.  Has never had a LDSCT chest (has a brother with h/o lung cancer).  Also has a h/o anticardiolipin antibody and elevated factor VIII and has been on chronic anticoagulation (sees Dr Mayer).  ROS as below.    5/10/2021 - Here for follow up, reviewed PFT with pt (FEV1 - 30%, DLCO - 12%), walk test (showed need fpr 2 LPM) and CT scan.  We will plan to change to TRELEGY (sample given and instructed).  She got Covid vaccine (Moderna)    2021 - Here for follow up, Pt is currently stable on present medications with no recent increases in their symptoms or use of rescue medications.  Since our last visit there have been no hospitalizations or ER visits for their respiratory issues and there does not seem to be anything to suggest unrecognized exacerbations.  I have reviewed the medical regimen and re-educated the pt on the role of rescue and controlling medications.  Inhaler technique and understanding seems adequate.  The patient reports no issues with any of there medications for their COPD.  Refills will be taken care of as needed.  All questions answered.  Patient has no known corona virus exposures and has been practicing social distancing.  We have discussed the virus and precautions and all questions have been answered.  We did have a discussion about booster shots.  She had a son who had Covid back in March but she did not get it then.  Still smoking about 1/2 PPD - we discussed this.    11/15/2021 - Here for follow up, Pt is currently stable on  "present medications with no recent increases in their symptoms or use of rescue medications.  Since our last visit there have been no hospitalizations or ER visits for their respiratory issues and there does not seem to be anything to suggest unrecognized exacerbations.  I have reviewed the medical regimen and re-educated the pt on the role of rescue and controlling medications.  Inhaler technique and understanding seems adequate.  The patient reports no issues with any of there medications for their COPD.  Refills will be taken care of as needed.  All questions answered.  Doesn't feel that TRELEGY "holds her" all day and wants to try BREZTRI to see if twice daily dosing helps.  Still smoking 10 cigarettes per day (we discussed this).  Has some dysphagia and is to see her PCP.  Patient has no known corona virus exposures and has been practicing social distancing.  We have discussed the virus and precautions and all questions have been answered.  Has had Covid vaccine (Moderna and plans to get booster)    8/31/2022 - Here for follow up, she felt that she did better on BREZTRI but insurance wouldn't cover and she is back on TRELEGY  but doesn't feel that she is "covered" for the whole day.  Pt is currently stable on present medications with no recent increases in their symptoms or use of rescue medications.  Since our last visit there have been no hospitalizations or ER visits for their respiratory issues and there does not seem to be anything to suggest unrecognized exacerbations.  I have reviewed the medical regimen and re-educated the pt on the role of rescue and controlling medications.  Inhaler technique and understanding seems adequate.  The patient reports no issues with any of there medications for their COPD.  Refills will be taken care of as needed.  All questions answered.  Still smoking about 10 cigarettes per day, did try to stop but didn't tolerate it.   Dysphagia is about the same - choking on stringy " meats (we discussed this) and I have recommended that she see a GI MD.  Patient has no known corona virus exposures and has been practicing social distancing.  We have discussed the virus and precautions and all questions have been answered.  She has had Covid vaccine and 2 boosters    1/26/2023 - Here for follow up, Pt is currently stable on present medications with no recent increases in their symptoms or use of rescue medications.  Since our last visit there have been no hospitalizations or ER visits for their respiratory issues and there does not seem to be anything to suggest unrecognized exacerbations.  I have reviewed the medical regimen and re-educated the pt on the role of rescue and controlling medications.  Inhaler technique and understanding seems adequate.  The patient reports no issues with any of there medications for their COPD.  Refills will be taken care of as needed.  All questions answered.  Still about 10 cigarettes per day and I have encouraged her to continue to try and stop.  She had 2nd shingles vaccine and has significant swelling and pain in her left arm (we discussed this).  She had Covid mid December and had symptoms for about 2 weeks and was treated with paxlovid.    4/27/2023 - Here for follow up, Pt is currently stable on present medications with no recent increases in their symptoms or use of rescue medications.  Since our last visit there have been no hospitalizations or ER visits for their respiratory issues and there does not seem to be anything to suggest unrecognized exacerbations.  I have reviewed the medical regimen and re-educated the pt on the role of rescue and controlling medications.  Inhaler technique and understanding seems adequate.  The patient reports no issues with any of there medications for their COPD.  Refills will be taken care of as needed.  All questions answered.  Still smoking about 1/2 PPD and has been trying to quit but not much luck yet.  No recent  issues with Covid.    7/27/2023 - here for follow up, Pt is currently stable on present medications with no recent increases in their symptoms or use of rescue medications.  Since our last visit there have been no hospitalizations or ER visits for their respiratory issues and there does not seem to be anything to suggest unrecognized exacerbations.  I have reviewed the medical regimen and re-educated the pt on the role of rescue and controlling medications.  Inhaler technique and understanding seems adequate.  The patient reports no issues with any of there medications for their COPD.  Refills will be taken care of as needed.  All questions answered.  But has noted some increased issues with allergies and congestion with the current heat.  Still smoking about 10 cigarettes/d (improved from about 2 PPD) - we discussed this.  Last ferritin is low and Dr Mayer is addressing this.    10/25/2023 - Overall sable but here to review recent CT scan.  We reviewed the images and will plan a repeat CT in 3 months.  All questions answered.  No new respiratory complaints.    1/24/2024 - Here for follow up, Pt is currently stable on present medications with no recent increases in their symptoms or use of rescue medications.  Since our last visit there have been no hospitalizations or ER visits for their respiratory issues and there does not seem to be anything to suggest unrecognized exacerbations.  I have reviewed the medical regimen and re-educated the pt on the role of rescue and controlling medications.  Inhaler technique and understanding seems adequate.  The patient reports no issues with any of there medications for their COPD.  Refills will be taken care of as needed.  All questions answered.  Had follow up CT chest which is stable and will       COPD Flowsheet    GOLD III D   Last PFT - 4/21  FEV1- 30 % DLCO - 12 %     + ICS/LABA/LAMA    + prn JAYLA    mMRC -  0 - SOB with strenuous exercise   -  1 - SOB level ground,  slight hill   -  2 - SOB walk slower or stop for breath level ground   - + 3 - SOB at 100 yards or after few minutes   -  4 - SOB in house, dressing    Referral to PULMONARY REHABILITATION -  NO    Vitamin D tested - no    Testosterone checked (male) - na    Tested for alpha-1-antitrypsin - NO      Low Dose Screening CT Chest    Cigarettes - 2 PPD x 40 YEARS  Still smoking -  YES  QUIT no    Date of last LD CT - 4/21    Result - scarring - needs repeat now    I have discussed with pt about using a screening CT of the chest due to history of cigarette smoking.  We have discussed the possible findings and possible actions as a result of these findings.  The pt would like to proceed.    Vaccination Status    Flu vaccination status - 22/23    Pneumonia vaccination status - +    Covid vaccination status - + (Moderna) and booster x 2    Tetanus/diptheria vaccination status - unsure    Shingles vaccination status - No    Home Oxygen Qualification    Qualifier - O2 sat 87 on room air  (,exertion    Date - 4/2021          O2 sat 94% on 2 LPM via nasal cannuli    DX - COPD    Face to face visit with pt concerning the need for O2 therapy, all questions answered.  I stressed the need for compliance.  Pt to call with any questions.              Past Medical History    Past Medical History:   Diagnosis Date    Anticardiolipin antibody positive     COPD (chronic obstructive pulmonary disease)     Hyperlipidemia     Hypothyroid     Personal history of tobacco use, presenting hazards to health     Splenic infarct        Past Surgical History    Past Surgical History:   Procedure Laterality Date    CHOLECYSTECTOMY      COLECTOMY      partial due to diverticulosis    COLONOSCOPY N/A 12/28/2023    Procedure: COLONOSCOPY;  Surgeon: Magdi Adames MD;  Location: [facility];  Service: Endoscopy;  Laterality: N/A;    HYSTERECTOMY      LAPAROSCOPIC REPAIR OF VENTRAL HERNIA      SMALL BOWEL ENTEROSCOPY N/A 12/28/2023    Procedure: PUSH  "ENTEROSCOPY;  Surgeon: Magdi Adames MD;  Location: [facility];  Service: Endoscopy;  Laterality: N/A;       Medications      Current Outpatient Medications:     acetaminophen (TYLENOL) 500 MG tablet, Take 500 mg by mouth every 6 (six) hours as needed for Pain., Disp: , Rfl:     albuterol (PROVENTIL) 2.5 mg /3 mL (0.083 %) nebulizer solution, Take 2.5 mg by nebulization every 6 (six) hours as needed for Wheezing. Rescue, Disp: , Rfl:     albuterol (PROVENTIL/VENTOLIN HFA) 90 mcg/actuation inhaler, Inhale 2 puffs into the lungs every 4 (four) hours as needed for Wheezing. Rescue, Disp: 8.5 g, Rfl: 11    ALBUTEROL INHL, Inhale into the lungs., Disp: , Rfl:     cyanocobalamin 1,000 mcg/mL injection, Inject 1 ml B 12 subq q 2 weeks., Disp: 6 mL, Rfl: 4    diclofenac sodium (VOLTAREN) 1 % Gel, , Disp: , Rfl:     ELIQUIS 5 mg Tab, TAKE ONE TABLET BY MOUTH TWO TIMES A DAY, Disp: 60 tablet, Rfl: 7    fluticasone-umeclidin-vilanter (TRELEGY ELLIPTA) 100-62.5-25 mcg DsDv, INHALE ONE PUFF INTO THE LUNGS ONCE DAILY, Disp: 60 each, Rfl: 11    levothyroxine (SYNTHROID) 112 MCG tablet, levothyroxine 112 mcg tablet  Take 1 tablet every day by oral route., Disp: , Rfl:     ondansetron (ZOFRAN-ODT) 4 MG TbDL, Take 4 mg by mouth., Disp: , Rfl:     pravastatin (PRAVACHOL) 40 MG tablet, pravastatin 40 mg tablet  Take 1 tablet every day by oral route for 90 days., Disp: , Rfl:     prenatal 105-iron-folic ac-dha 30 mg iron- 1.4 mg-300 mg Cmpk, Take by mouth., Disp: , Rfl:     enoxaparin (LOVENOX) 40 mg/0.4 mL Syrg, Inject into the skin., Disp: , Rfl:     syringe with needle (TUBERCULIN SYRINGE) 1 mL 27 x 1/2" Syrg, Use for B 12 subq monthly injections., Disp: 6 each, Rfl: 4    syringe with needle, safety 3 mL 25 gauge x 5/8" Syrg, 1 Syringe by Misc.(Non-Drug; Combo Route) route every 30 days., Disp: 3 each, Rfl: 4    Allergies    Review of patient's allergies indicates:   Allergen Reactions    Zosyn [piperacillin-tazobactam] Swelling    "  Swelling to lips       SocHx    Social History     Tobacco Use   Smoking Status Every Day    Current packs/day: 0.50    Average packs/day: 0.5 packs/day for 40.0 years (20.0 ttl pk-yrs)    Types: Cigarettes   Smokeless Tobacco Never   Tobacco Comments    currently smoking 1/2 PPD    Smokes 10 cigarettes daily       Social History     Substance and Sexual Activity   Alcohol Use No       Drug Use - no  Occupation - retired   Asbestos exposure - no  Pets - na    FMHx    Family History   Problem Relation Age of Onset    Breast cancer Maternal Aunt     Heart disease Father     Ovarian cancer Sister     Lung cancer Brother          Review of Systems  Review of Systems   Constitutional:  Positive for malaise/fatigue. Negative for chills, diaphoresis, fever and weight loss.   HENT:  Negative for congestion, ear discharge, ear pain, hearing loss, nosebleeds, sinus pain, sore throat and tinnitus.    Eyes:  Negative for pain.   Respiratory:  Positive for cough, shortness of breath and wheezing. Negative for hemoptysis, sputum production and stridor.    Cardiovascular:  Positive for chest pain (intermittent sharp pain) and leg swelling (later in day). Negative for palpitations, orthopnea, claudication and PND.   Gastrointestinal:  Negative for abdominal pain, blood in stool, constipation, diarrhea, heartburn, melena, nausea and vomiting.   Genitourinary:  Negative for dysuria, frequency, hematuria and urgency.   Musculoskeletal:  Positive for back pain. Negative for falls and myalgias.   Neurological:  Positive for weakness. Negative for dizziness, tingling, tremors, sensory change, speech change, focal weakness, seizures, loss of consciousness and headaches.   Psychiatric/Behavioral:  Negative for depression, substance abuse and suicidal ideas. The patient is not nervous/anxious.        Physical Exam    Vitals:    01/24/24 1101   BP: 128/74   BP Location: Left arm   Patient Position: Sitting   BP Method: Medium  (Manual)   Pulse: 81   SpO2: 95%   Weight: 101.2 kg (223 lb 1.6 oz)       Physical Exam  Vitals and nursing note reviewed.   Constitutional:       General: She is not in acute distress.     Appearance: Normal appearance. She is well-developed. She is not ill-appearing, toxic-appearing or diaphoretic.   HENT:      Head: Normocephalic and atraumatic.      Right Ear: External ear normal.      Left Ear: External ear normal.      Nose: Nose normal.   Eyes:      General: No scleral icterus.        Right eye: No discharge.         Left eye: No discharge.      Conjunctiva/sclera: Conjunctivae normal.      Pupils: Pupils are equal, round, and reactive to light.   Neck:      Thyroid: No thyromegaly.      Vascular: No carotid bruit or JVD.      Trachea: No tracheal deviation.   Cardiovascular:      Rate and Rhythm: Regular rhythm. Tachycardia present.      Pulses: Normal pulses.      Heart sounds: Normal heart sounds. No murmur heard.     No friction rub. No gallop.   Pulmonary:      Effort: Pulmonary effort is normal. No respiratory distress.      Breath sounds: No stridor. Wheezing (few expiratory wheezes) present. No rhonchi or rales.      Comments: Decreased BS throughout  Chest:      Chest wall: No tenderness.   Abdominal:      General: Bowel sounds are normal. There is no distension.      Palpations: Abdomen is soft.      Tenderness: There is no abdominal tenderness. There is no guarding.   Musculoskeletal:         General: No tenderness. Normal range of motion.      Cervical back: Normal range of motion and neck supple. No rigidity or tenderness.      Right lower leg: No edema.      Left lower leg: No edema.   Lymphadenopathy:      Cervical: No cervical adenopathy.   Skin:     General: Skin is warm and dry.   Neurological:      General: No focal deficit present.      Mental Status: She is alert and oriented to person, place, and time. Mental status is at baseline.      Cranial Nerves: No cranial nerve deficit.    Psychiatric:         Mood and Affect: Mood normal.         Behavior: Behavior normal.         Thought Content: Thought content normal.         Judgment: Judgment normal.         Labs    Lab Results   Component Value Date    WBC 9.15 11/02/2023    HGB 14.4 11/02/2023    HCT 44.2 11/02/2023     11/02/2023       Sodium   Date Value Ref Range Status   11/02/2023 139 136 - 145 mmol/L Final   04/11/2019 136 134 - 144 mmol/L      Potassium   Date Value Ref Range Status   11/02/2023 4.2 3.5 - 5.1 mmol/L Final     Chloride   Date Value Ref Range Status   11/02/2023 106 95 - 110 mmol/L Final   04/11/2019 97 (L) 98 - 110 mmol/L      CO2   Date Value Ref Range Status   11/02/2023 28 23 - 29 mmol/L Final     Glucose   Date Value Ref Range Status   11/02/2023 88 70 - 110 mg/dL Final   04/11/2019 87 70 - 99 mg/dL      BUN   Date Value Ref Range Status   11/02/2023 12 8 - 23 mg/dL Final     Creatinine   Date Value Ref Range Status   11/02/2023 0.8 0.5 - 1.4 mg/dL Final   04/11/2019 0.86 0.60 - 1.40 mg/dL      Calcium   Date Value Ref Range Status   11/02/2023 9.8 8.7 - 10.5 mg/dL Final     Total Protein   Date Value Ref Range Status   11/02/2023 7.5 6.0 - 8.4 g/dL Final     Albumin   Date Value Ref Range Status   11/02/2023 4.2 3.5 - 5.2 g/dL Final   04/11/2019 4.4 3.1 - 4.7 g/dL      Total Bilirubin   Date Value Ref Range Status   11/02/2023 0.3 0.1 - 1.0 mg/dL Final     Comment:     For infants and newborns, interpretation of results should be based  on gestational age, weight and in agreement with clinical  observations.    Premature Infant recommended reference ranges:  Up to 24 hours.............<8.0 mg/dL  Up to 48 hours............<12.0 mg/dL  3-5 days..................<15.0 mg/dL  6-29 days.................<15.0 mg/dL       Alkaline Phosphatase   Date Value Ref Range Status   11/02/2023 47 (L) 55 - 135 U/L Final     AST   Date Value Ref Range Status   11/02/2023 19 10 - 40 U/L Final     ALT   Date Value Ref Range  Status   11/02/2023 16 10 - 44 U/L Final     Anion Gap   Date Value Ref Range Status   11/02/2023 5 (L) 8 - 16 mmol/L Final       Xrays    CT chest (10/19/23)  Emphysematous changes with new noncalcified nodules in the right upper lobe the largest measuring 7 x 6 mm.  Atelectasis in the lingula and right middle lobe  Stable faint tiny subpleural peripheral groundglass nodular opacities in the upper lobes  LUNG-RADS CATEGORY 4A: SUSPICIOUS FINDINGS  RECOMMENDATION: 3 month follow-up LDCT (PET/CT may be considered when solid component greater than 7 mm is present    CT chest (1/19/24)  Scattered pulmonary nodules in the lungs, without adverse interval change from the previous exam. Consider continued interval follow-up to document stability.     Impression/Plan    Problem List Items Addressed This Visit          Pulmonary    COPD (chronic obstructive pulmonary disease) - Primary     Continue present medications she liked BREZTRI but insurance wouldn't cover  Will refill medications as needed.  Instructed patient to contact us with any issues concerning their medications (cost, reactions, etc.).  Have discussed with patient about inciting conditions which may exacerbate their disease.  We did discuss possible new therapies or de-escalation of therapy (if appropriate).  Asked patient if they were interested in pursuing pulmonary rehabilitation.  All questions answered  Patient instructed that they are to call if symptoms change or new issues develop prior to their next visit.              Other    Abnormal CT of the chest     Repeat CT 7/2024                        Alexys Jackson MD

## 2024-01-31 RX ORDER — FLUTICASONE FUROATE, UMECLIDINIUM BROMIDE AND VILANTEROL TRIFENATATE 100; 62.5; 25 UG/1; UG/1; UG/1
1 POWDER RESPIRATORY (INHALATION)
Qty: 60 EACH | Refills: 11 | Status: SHIPPED | OUTPATIENT
Start: 2024-01-31

## 2024-02-05 DIAGNOSIS — J44.9 CHRONIC OBSTRUCTIVE PULMONARY DISEASE, UNSPECIFIED COPD TYPE: Primary | ICD-10-CM

## 2024-02-05 RX ORDER — ALBUTEROL SULFATE 90 UG/1
2 AEROSOL, METERED RESPIRATORY (INHALATION) EVERY 6 HOURS PRN
Qty: 18 G | Refills: 3 | Status: SHIPPED | OUTPATIENT
Start: 2024-02-05

## 2024-02-06 ENCOUNTER — OFFICE VISIT (OUTPATIENT)
Dept: HEMATOLOGY/ONCOLOGY | Facility: CLINIC | Age: 69
End: 2024-02-06
Payer: MEDICARE

## 2024-02-06 VITALS
DIASTOLIC BLOOD PRESSURE: 77 MMHG | HEIGHT: 64 IN | RESPIRATION RATE: 20 BRPM | WEIGHT: 219 LBS | BODY MASS INDEX: 37.39 KG/M2 | TEMPERATURE: 97 F | SYSTOLIC BLOOD PRESSURE: 160 MMHG | HEART RATE: 94 BPM

## 2024-02-06 DIAGNOSIS — D50.0 IRON DEFICIENCY ANEMIA DUE TO CHRONIC BLOOD LOSS: Primary | ICD-10-CM

## 2024-02-06 DIAGNOSIS — J44.9 CHRONIC OBSTRUCTIVE PULMONARY DISEASE, UNSPECIFIED COPD TYPE: ICD-10-CM

## 2024-02-06 DIAGNOSIS — E53.8 B12 DEFICIENCY: ICD-10-CM

## 2024-02-06 DIAGNOSIS — D73.5 SPLENIC INFARCT: ICD-10-CM

## 2024-02-06 PROCEDURE — 99214 OFFICE O/P EST MOD 30 MIN: CPT | Mod: S$GLB,,, | Performed by: INTERNAL MEDICINE

## 2024-02-06 NOTE — PROGRESS NOTES
[facility] hematology Oncology in office subsequent encounter note  2/6/24  Bassam Givens MD, Hazel Tavera        Presented with LUQ pain.  Had splenic artery thrombus with splenic infarct.  On Eliquis 5mg BID.  No LUQ pain sx now.    Coagulation evaluation increased Factor 8 and increased Anti cardiolipid IgM antibody levels.  Persistently increased on repeat determination.    Estimate 30% risk of clot event over next 2-3 years.    Continue Eliquis 5mg BID therapeutic and prophylaxis.    She admits to blurred vision and dizzyness intermittently, sx predated the splenic infarct and have not changed while on Eliquis.    History of COPD.  On 02 24/7.    Re check her CAT of abdomen  regards status of splenic infarct showed resolution of splenic infarct area. 2019.  Splenic aneurism stable since 2019.    Fe deficiency anemia, Heme positive stools.  After Fe replenishment, energy increased 10/10.   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  Check again in 3 years.    CT of chest stable pulmonary nodules.  Check again in 6 months.    Continue Eliquis BID.      She is seen nurse practitioner Gabriel for general care and for thyroid management.     She saw her cardiologist, Dr. Oliva, due for chemical stress test.    Her son had COVID, but was asymptomatic.  She did not have symptoms and isolated herself.    She has had 2 of 2 vaccines and the booster shot and the flu shot.  She received the pneumonia vaccination 2 years ago.  She has not taken the shingles vaccine.    She has COPD and is on oxygen 2 liters/minute 24/7.     No acute distress, does not appear chronically ill.  She does not have palpable lymphadenopathy.  Her lungs are clear bilaterally.  She has regular rhythm without murmur.  Abdomen is flat without distention, abdomen is nontender without palpable hepatomegaly or splenomegaly or abdominal mass.  Calves are nontender without  edema, no petechiae or purpura.  Neurologically grossly intact    History of splenic infarct with  Increased factor 8 at 250 and increased anticardiolipin antibody at 13.  Now.    Continue Eliquis 5 mg b.i.d..  Return to clinic in 6 months.    H/H 6/20  Ferritin 5  B 12 69    Gave Ferrlicet x's 16  weeks.  Energy 2/10 to 10/10.  B 12 weekly x's 4 then monthly.  Stool for occult blood.  Stool heme negative x's 6.    Hgb 7 to 12 to 14.5 to 13.9.  Ferritin 5 to 108. To 10.  B 12  551, to 315, increase  B 12 monthly. To BID.    Fe deficiency anemia, B 12 deficiency anemia.  Scheduled Injectafer x's 1 to increase Ferritin.  Completed.    CT of chest screening, 6-7 mm nodules seen, follow up in 6 months.  CT of abdomin spleen NL, splenic aneurism unchanged at 8 mm, stable, since 2019.    Hgb 14.4, B 12 315-353.  Stool heme + 2/3 specimens.  Dr. Adames, AVMs, polyps, diverticular dx.    Check lab 4 months.  See me 6/2024.

## 2024-02-07 ENCOUNTER — LAB VISIT (OUTPATIENT)
Dept: LAB | Facility: HOSPITAL | Age: 69
End: 2024-02-07
Attending: INTERNAL MEDICINE
Payer: MEDICARE

## 2024-02-07 DIAGNOSIS — I72.8 SPLENIC ARTERY ANEURYSM: ICD-10-CM

## 2024-02-07 DIAGNOSIS — D73.5 SPLENIC INFARCT: ICD-10-CM

## 2024-02-07 DIAGNOSIS — E53.8 B12 DEFICIENCY: ICD-10-CM

## 2024-02-07 DIAGNOSIS — D50.0 IRON DEFICIENCY ANEMIA DUE TO CHRONIC BLOOD LOSS: ICD-10-CM

## 2024-02-07 LAB
ALBUMIN SERPL BCP-MCNC: 4 G/DL (ref 3.5–5.2)
ALP SERPL-CCNC: 45 U/L (ref 55–135)
ALT SERPL W/O P-5'-P-CCNC: 14 U/L (ref 10–44)
ANION GAP SERPL CALC-SCNC: 5 MMOL/L (ref 8–16)
AST SERPL-CCNC: 17 U/L (ref 10–40)
BASOPHILS # BLD AUTO: 0.07 K/UL (ref 0–0.2)
BASOPHILS NFR BLD: 0.8 % (ref 0–1.9)
BILIRUB SERPL-MCNC: 0.3 MG/DL (ref 0.1–1)
BUN SERPL-MCNC: 11 MG/DL (ref 8–23)
CALCIUM SERPL-MCNC: 9.4 MG/DL (ref 8.7–10.5)
CHLORIDE SERPL-SCNC: 106 MMOL/L (ref 95–110)
CO2 SERPL-SCNC: 28 MMOL/L (ref 23–29)
CREAT SERPL-MCNC: 0.9 MG/DL (ref 0.5–1.4)
CREAT SERPL-MCNC: 0.9 MG/DL (ref 0.5–1.4)
DIFFERENTIAL METHOD BLD: NORMAL
EOSINOPHIL # BLD AUTO: 0.3 K/UL (ref 0–0.5)
EOSINOPHIL NFR BLD: 3.3 % (ref 0–8)
ERYTHROCYTE [DISTWIDTH] IN BLOOD BY AUTOMATED COUNT: 13.1 % (ref 11.5–14.5)
EST. GFR  (NO RACE VARIABLE): >60 ML/MIN/1.73 M^2
EST. GFR  (NO RACE VARIABLE): >60 ML/MIN/1.73 M^2
FERRITIN SERPL-MCNC: 19.4 NG/ML (ref 20–300)
GLUCOSE SERPL-MCNC: 95 MG/DL (ref 70–110)
HCT VFR BLD AUTO: 44 % (ref 37–48.5)
HGB BLD-MCNC: 14.2 G/DL (ref 12–16)
IMM GRANULOCYTES # BLD AUTO: 0.02 K/UL (ref 0–0.04)
IMM GRANULOCYTES NFR BLD AUTO: 0.2 % (ref 0–0.5)
LYMPHOCYTES # BLD AUTO: 2.6 K/UL (ref 1–4.8)
LYMPHOCYTES NFR BLD: 30.3 % (ref 18–48)
MCH RBC QN AUTO: 29.9 PG (ref 27–31)
MCHC RBC AUTO-ENTMCNC: 32.3 G/DL (ref 32–36)
MCV RBC AUTO: 93 FL (ref 82–98)
MONOCYTES # BLD AUTO: 0.8 K/UL (ref 0.3–1)
MONOCYTES NFR BLD: 9.5 % (ref 4–15)
NEUTROPHILS # BLD AUTO: 4.7 K/UL (ref 1.8–7.7)
NEUTROPHILS NFR BLD: 55.9 % (ref 38–73)
NRBC BLD-RTO: 0 /100 WBC
PLATELET # BLD AUTO: 288 K/UL (ref 150–450)
PMV BLD AUTO: 9.4 FL (ref 9.2–12.9)
POTASSIUM SERPL-SCNC: 4.2 MMOL/L (ref 3.5–5.1)
PROT SERPL-MCNC: 7.2 G/DL (ref 6–8.4)
RBC # BLD AUTO: 4.75 M/UL (ref 4–5.4)
SODIUM SERPL-SCNC: 139 MMOL/L (ref 136–145)
VIT B12 SERPL-MCNC: 450 PG/ML (ref 210–950)
WBC # BLD AUTO: 8.44 K/UL (ref 3.9–12.7)

## 2024-02-07 PROCEDURE — 82607 VITAMIN B-12: CPT | Performed by: INTERNAL MEDICINE

## 2024-02-07 PROCEDURE — 80053 COMPREHEN METABOLIC PANEL: CPT | Performed by: INTERNAL MEDICINE

## 2024-02-07 PROCEDURE — 36415 COLL VENOUS BLD VENIPUNCTURE: CPT | Performed by: INTERNAL MEDICINE

## 2024-02-07 PROCEDURE — 85025 COMPLETE CBC W/AUTO DIFF WBC: CPT | Performed by: INTERNAL MEDICINE

## 2024-02-07 PROCEDURE — 82728 ASSAY OF FERRITIN: CPT | Performed by: INTERNAL MEDICINE

## 2024-04-29 DIAGNOSIS — J44.9 CHRONIC OBSTRUCTIVE PULMONARY DISEASE, UNSPECIFIED COPD TYPE: Primary | ICD-10-CM

## 2024-04-29 RX ORDER — ALBUTEROL SULFATE 90 UG/1
2 AEROSOL, METERED RESPIRATORY (INHALATION) EVERY 6 HOURS PRN
Qty: 18 G | Refills: 11 | Status: SHIPPED | OUTPATIENT
Start: 2024-04-29

## 2024-04-29 NOTE — TELEPHONE ENCOUNTER
----- Message from Elly Tyson sent at 4/29/2024  9:15 AM CDT -----  Regarding: Refill Request  Contact: [phone], Clarita  The patient called requesting a refill on her Albuterol/Vetonlin inhaler to be called in to the [facility] pharmacy. Please assist with this request, the patient was unaware out fax machine is down and has been trying to get this filled for a week. Thanks in advance for your assistance.

## 2024-05-24 ENCOUNTER — TELEPHONE (OUTPATIENT)
Facility: CLINIC | Age: 69
End: 2024-05-24
Payer: MEDICARE

## 2024-05-24 DIAGNOSIS — D50.0 IRON DEFICIENCY ANEMIA DUE TO CHRONIC BLOOD LOSS: Primary | ICD-10-CM

## 2024-05-24 DIAGNOSIS — E53.8 B12 DEFICIENCY: ICD-10-CM

## 2024-06-03 ENCOUNTER — TELEPHONE (OUTPATIENT)
Facility: CLINIC | Age: 69
End: 2024-06-03
Payer: MEDICARE

## 2024-06-03 NOTE — TELEPHONE ENCOUNTER
I spoke with pt and reminded her to have labs done prior to appt here on 6/10/24 pt verbalized understanding.

## 2024-06-05 ENCOUNTER — LAB VISIT (OUTPATIENT)
Dept: LAB | Facility: HOSPITAL | Age: 69
End: 2024-06-05
Attending: INTERNAL MEDICINE
Payer: MEDICARE

## 2024-06-05 DIAGNOSIS — D50.0 IRON DEFICIENCY ANEMIA DUE TO CHRONIC BLOOD LOSS: ICD-10-CM

## 2024-06-05 DIAGNOSIS — E53.8 B12 DEFICIENCY: ICD-10-CM

## 2024-06-05 LAB
BASOPHILS # BLD AUTO: 0.07 K/UL (ref 0–0.2)
BASOPHILS NFR BLD: 0.8 % (ref 0–1.9)
DIFFERENTIAL METHOD BLD: NORMAL
EOSINOPHIL # BLD AUTO: 0.2 K/UL (ref 0–0.5)
EOSINOPHIL NFR BLD: 2.6 % (ref 0–8)
ERYTHROCYTE [DISTWIDTH] IN BLOOD BY AUTOMATED COUNT: 13.2 % (ref 11.5–14.5)
HCT VFR BLD AUTO: 45.1 % (ref 37–48.5)
HGB BLD-MCNC: 14.5 G/DL (ref 12–16)
IMM GRANULOCYTES # BLD AUTO: 0.04 K/UL (ref 0–0.04)
IMM GRANULOCYTES NFR BLD AUTO: 0.5 % (ref 0–0.5)
LYMPHOCYTES # BLD AUTO: 2.3 K/UL (ref 1–4.8)
LYMPHOCYTES NFR BLD: 26.3 % (ref 18–48)
MCH RBC QN AUTO: 29.1 PG (ref 27–31)
MCHC RBC AUTO-ENTMCNC: 32.2 G/DL (ref 32–36)
MCV RBC AUTO: 90 FL (ref 82–98)
MONOCYTES # BLD AUTO: 0.9 K/UL (ref 0.3–1)
MONOCYTES NFR BLD: 10.4 % (ref 4–15)
NEUTROPHILS # BLD AUTO: 5.2 K/UL (ref 1.8–7.7)
NEUTROPHILS NFR BLD: 59.4 % (ref 38–73)
NRBC BLD-RTO: 0 /100 WBC
PLATELET # BLD AUTO: 286 K/UL (ref 150–450)
PMV BLD AUTO: 9.4 FL (ref 9.2–12.9)
RBC # BLD AUTO: 4.99 M/UL (ref 4–5.4)
WBC # BLD AUTO: 8.68 K/UL (ref 3.9–12.7)

## 2024-06-05 PROCEDURE — 85025 COMPLETE CBC W/AUTO DIFF WBC: CPT | Performed by: INTERNAL MEDICINE

## 2024-06-05 PROCEDURE — 80053 COMPREHEN METABOLIC PANEL: CPT | Performed by: INTERNAL MEDICINE

## 2024-06-05 PROCEDURE — 82607 VITAMIN B-12: CPT | Performed by: INTERNAL MEDICINE

## 2024-06-05 PROCEDURE — 36415 COLL VENOUS BLD VENIPUNCTURE: CPT | Performed by: INTERNAL MEDICINE

## 2024-06-05 PROCEDURE — 82728 ASSAY OF FERRITIN: CPT | Performed by: INTERNAL MEDICINE

## 2024-06-06 LAB
ALBUMIN SERPL BCP-MCNC: 4.4 G/DL (ref 3.5–5.2)
ALP SERPL-CCNC: 53 U/L (ref 55–135)
ALT SERPL W/O P-5'-P-CCNC: 20 U/L (ref 10–44)
ANION GAP SERPL CALC-SCNC: 10 MMOL/L (ref 8–16)
AST SERPL-CCNC: 24 U/L (ref 10–40)
BILIRUB SERPL-MCNC: 0.3 MG/DL (ref 0.1–1)
BUN SERPL-MCNC: 9 MG/DL (ref 8–23)
CALCIUM SERPL-MCNC: 9.9 MG/DL (ref 8.7–10.5)
CHLORIDE SERPL-SCNC: 105 MMOL/L (ref 95–110)
CO2 SERPL-SCNC: 25 MMOL/L (ref 23–29)
CREAT SERPL-MCNC: 0.9 MG/DL (ref 0.5–1.4)
EST. GFR  (NO RACE VARIABLE): >60 ML/MIN/1.73 M^2
FERRITIN SERPL-MCNC: 14.3 NG/ML (ref 20–300)
GLUCOSE SERPL-MCNC: 85 MG/DL (ref 70–110)
POTASSIUM SERPL-SCNC: 4.3 MMOL/L (ref 3.5–5.1)
PROT SERPL-MCNC: 7.6 G/DL (ref 6–8.4)
SODIUM SERPL-SCNC: 140 MMOL/L (ref 136–145)
VIT B12 SERPL-MCNC: 286 PG/ML (ref 210–950)

## 2024-06-10 ENCOUNTER — TELEPHONE (OUTPATIENT)
Facility: CLINIC | Age: 69
End: 2024-06-10

## 2024-06-10 ENCOUNTER — OFFICE VISIT (OUTPATIENT)
Facility: CLINIC | Age: 69
End: 2024-06-10
Payer: MEDICARE

## 2024-06-10 VITALS
TEMPERATURE: 98 F | HEART RATE: 93 BPM | BODY MASS INDEX: 38.74 KG/M2 | DIASTOLIC BLOOD PRESSURE: 75 MMHG | SYSTOLIC BLOOD PRESSURE: 159 MMHG | RESPIRATION RATE: 16 BRPM | WEIGHT: 222.19 LBS

## 2024-06-10 DIAGNOSIS — E53.8 B12 DEFICIENCY: ICD-10-CM

## 2024-06-10 DIAGNOSIS — D50.0 IRON DEFICIENCY ANEMIA DUE TO CHRONIC BLOOD LOSS: Primary | ICD-10-CM

## 2024-06-10 DIAGNOSIS — D51.8 DIETARY VITAMIN B12 DEFICIENCY ANEMIA: ICD-10-CM

## 2024-06-10 PROCEDURE — 99215 OFFICE O/P EST HI 40 MIN: CPT | Mod: S$PBB,,, | Performed by: INTERNAL MEDICINE

## 2024-06-10 PROCEDURE — 99214 OFFICE O/P EST MOD 30 MIN: CPT | Mod: PBBFAC,PN | Performed by: INTERNAL MEDICINE

## 2024-06-10 PROCEDURE — G2211 COMPLEX E/M VISIT ADD ON: HCPCS | Mod: S$PBB,,, | Performed by: INTERNAL MEDICINE

## 2024-06-10 PROCEDURE — 99999 PR PBB SHADOW E&M-EST. PATIENT-LVL IV: CPT | Mod: PBBFAC,,, | Performed by: INTERNAL MEDICINE

## 2024-06-10 RX ORDER — SYRINGE-NEEDLE,INSULIN,0.5 ML 28GX1/2"
SYRINGE, EMPTY DISPOSABLE MISCELLANEOUS
Qty: 6 EACH | Refills: 4 | Status: SHIPPED | OUTPATIENT
Start: 2024-06-10 | End: 2024-06-10 | Stop reason: SDUPTHER

## 2024-06-10 RX ORDER — EPINEPHRINE 0.3 MG/.3ML
0.3 INJECTION SUBCUTANEOUS ONCE AS NEEDED
Status: CANCELLED | OUTPATIENT
Start: 2024-06-19

## 2024-06-10 RX ORDER — CYANOCOBALAMIN 1000 UG/ML
INJECTION, SOLUTION INTRAMUSCULAR; SUBCUTANEOUS
Qty: 6 ML | Refills: 4 | Status: SHIPPED | OUTPATIENT
Start: 2024-06-10

## 2024-06-10 RX ORDER — CYANOCOBALAMIN 1000 UG/ML
INJECTION, SOLUTION INTRAMUSCULAR; SUBCUTANEOUS
Qty: 6 ML | Refills: 4 | Status: SHIPPED | OUTPATIENT
Start: 2024-06-10 | End: 2024-06-10 | Stop reason: SDUPTHER

## 2024-06-10 RX ORDER — SODIUM CHLORIDE 9 MG/ML
INJECTION, SOLUTION INTRAVENOUS CONTINUOUS
Status: CANCELLED | OUTPATIENT
Start: 2024-06-19

## 2024-06-10 RX ORDER — HEPARIN 100 UNIT/ML
5 SYRINGE INTRAVENOUS
Status: CANCELLED | OUTPATIENT
Start: 2024-06-19

## 2024-06-10 RX ORDER — SODIUM CHLORIDE 0.9 % (FLUSH) 0.9 %
10 SYRINGE (ML) INJECTION
Status: CANCELLED | OUTPATIENT
Start: 2024-06-19

## 2024-06-10 RX ORDER — DIPHENHYDRAMINE HYDROCHLORIDE 50 MG/ML
50 INJECTION INTRAMUSCULAR; INTRAVENOUS ONCE AS NEEDED
Status: CANCELLED | OUTPATIENT
Start: 2024-06-19

## 2024-06-10 RX ORDER — SYRINGE-NEEDLE,INSULIN,0.5 ML 28GX1/2"
SYRINGE, EMPTY DISPOSABLE MISCELLANEOUS
Qty: 6 EACH | Refills: 4 | Status: SHIPPED | OUTPATIENT
Start: 2024-06-10

## 2024-06-11 NOTE — TELEPHONE ENCOUNTER
Orders for Injectafer x's 2 at [facility].  Get auth  Get date and time  RTC 4 months.

## 2024-06-11 NOTE — PROGRESS NOTES
[facility] ONCOLOGY [address] hematology Oncology in office subsequent encounter note  6/10/24  Bassam Givens MD, Hazel Tavera        Presented with LUQ pain.  Had splenic artery thrombus with splenic infarct.  On Eliquis 5mg BID.  No LUQ pain sx now.    Coagulation evaluation increased Factor 8 and increased Anti cardiolipid IgM antibody levels.  Persistently increased on repeat determination.    Estimate 30% risk of clot event over next 2-3 years.    Continue Eliquis 5mg BID therapeutic and prophylaxis.    She admits to blurred vision and dizzyness intermittently, sx predated the splenic infarct and have not changed while on Eliquis.    History of COPD.  On 02 24/7.    Re check her CAT of abdomen  regards status of splenic infarct showed resolution of splenic infarct area. 2019.  Splenic aneurism stable since 2019.    Fe deficiency anemia, Heme positive stools.  After Fe replenishment, energy increased 10/10.   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  Check again in 3 years.    CT of chest stable pulmonary nodules.  Check again in 6 months.    Continue Eliquis BID.      She is seen nurse practitioner Gabriel for general care and for thyroid management.     She saw her cardiologist, Dr. Oliva, due for chemical stress test.    Her son had COVID, but was asymptomatic.  She did not have symptoms and isolated herself.    She has had 2 of 2 vaccines and the booster shot and the flu shot.  She received the pneumonia vaccination 2 years ago.  She has not taken the shingles vaccine.    She has COPD and is on oxygen 2 liters/minute 24/7.     No acute distress, does not appear chronically ill.  She does not have palpable lymphadenopathy.  Her lungs are clear bilaterally.  She has regular rhythm without murmur.  Abdomen is flat without distention, abdomen is nontender without palpable hepatomegaly or splenomegaly or abdominal mass.  Calves are nontender  without edema, no petechiae or purpura.  Neurologically grossly intact    History of splenic infarct with  Increased factor 8 at 250 and increased anticardiolipin antibody at 13.  Now.    Continue Eliquis 5 mg b.i.d..  Return to clinic in 6 months.    H/H 6/20  Ferritin 5  B 12 69    Gave Ferrlicet x's 16  weeks.  Energy 2/10 to 10/10.  B 12 weekly x's 4 then monthly.  Stool for occult blood.  Stool heme negative x's 6.    Hgb 7 to 12 to 14.5   Ferritin now 14  B 12  551, to 315, increase  B 12 monthly. To BID.    Fe deficiency anemia, B 12 deficiency anemia.  Scheduled Injectafer x's 1 to increase Ferritin.  Completed.    CT of chest screening, 6-7 mm nodules seen, follow up in 6 months.  CT of abdomin spleen NL, splenic aneurism unchanged at 8 mm, stable, since 2019.    Hgb 14.4, B 12 315-353.  Stool heme + 2/3 specimens.  Dr. Adames, AVMs, polyps, diverticular dx.    Order Fe infusions [facility]    Check lab 4 months.  See me 10/2024.

## 2024-06-17 ENCOUNTER — INFUSION (OUTPATIENT)
Dept: INFUSION THERAPY | Facility: HOSPITAL | Age: 69
End: 2024-06-17
Attending: INTERNAL MEDICINE
Payer: MEDICARE

## 2024-06-17 VITALS
BODY MASS INDEX: 38.78 KG/M2 | SYSTOLIC BLOOD PRESSURE: 129 MMHG | HEART RATE: 66 BPM | WEIGHT: 222.38 LBS | TEMPERATURE: 98 F | OXYGEN SATURATION: 97 % | DIASTOLIC BLOOD PRESSURE: 66 MMHG

## 2024-06-17 DIAGNOSIS — D50.0 IRON DEFICIENCY ANEMIA DUE TO CHRONIC BLOOD LOSS: ICD-10-CM

## 2024-06-17 DIAGNOSIS — E53.8 B12 DEFICIENCY: Primary | ICD-10-CM

## 2024-06-17 PROCEDURE — 25000003 PHARM REV CODE 250: Performed by: INTERNAL MEDICINE

## 2024-06-17 PROCEDURE — 96365 THER/PROPH/DIAG IV INF INIT: CPT

## 2024-06-17 PROCEDURE — 63600175 PHARM REV CODE 636 W HCPCS: Mod: JZ | Performed by: INTERNAL MEDICINE

## 2024-06-17 RX ORDER — EPINEPHRINE 0.3 MG/.3ML
0.3 INJECTION SUBCUTANEOUS ONCE AS NEEDED
OUTPATIENT
Start: 2024-06-24

## 2024-06-17 RX ORDER — SODIUM CHLORIDE 0.9 % (FLUSH) 0.9 %
10 SYRINGE (ML) INJECTION
OUTPATIENT
Start: 2024-06-24

## 2024-06-17 RX ORDER — HEPARIN 100 UNIT/ML
5 SYRINGE INTRAVENOUS
OUTPATIENT
Start: 2024-06-24

## 2024-06-17 RX ORDER — HEPARIN 100 UNIT/ML
5 SYRINGE INTRAVENOUS
Status: DISCONTINUED | OUTPATIENT
Start: 2024-06-17 | End: 2024-06-17 | Stop reason: HOSPADM

## 2024-06-17 RX ORDER — SODIUM CHLORIDE 9 MG/ML
INJECTION, SOLUTION INTRAVENOUS CONTINUOUS
OUTPATIENT
Start: 2024-06-24

## 2024-06-17 RX ORDER — SODIUM CHLORIDE 0.9 % (FLUSH) 0.9 %
10 SYRINGE (ML) INJECTION
Status: DISCONTINUED | OUTPATIENT
Start: 2024-06-17 | End: 2024-06-17 | Stop reason: HOSPADM

## 2024-06-17 RX ORDER — DIPHENHYDRAMINE HYDROCHLORIDE 50 MG/ML
50 INJECTION INTRAMUSCULAR; INTRAVENOUS ONCE AS NEEDED
OUTPATIENT
Start: 2024-06-24

## 2024-06-17 RX ORDER — SODIUM CHLORIDE 9 MG/ML
INJECTION, SOLUTION INTRAVENOUS CONTINUOUS
Status: DISCONTINUED | OUTPATIENT
Start: 2024-06-17 | End: 2024-06-17 | Stop reason: HOSPADM

## 2024-06-17 RX ADMIN — FERRIC CARBOXYMALTOSE INJECTION 750 MG: 50 INJECTION, SOLUTION INTRAVENOUS at 09:06

## 2024-06-17 NOTE — PLAN OF CARE
Problem: Adult Inpatient Plan of Care  Goal: Plan of Care Review  6/17/2024 0954 by Miri Beach RN  Outcome: Met  6/17/2024 0954 by Miri Beach RN  Outcome: Progressing  Goal: Patient-Specific Goal (Individualized)  6/17/2024 0954 by Miri Beach RN  Outcome: Met  6/17/2024 0954 by Miri Beach RN  Outcome: Progressing  Goal: Absence of Hospital-Acquired Illness or Injury  6/17/2024 0954 by Miri Beach RN  Outcome: Met  6/17/2024 0954 by Miri Beach RN  Outcome: Progressing  Goal: Optimal Comfort and Wellbeing  6/17/2024 0954 by Miri Beach RN  Outcome: Met  6/17/2024 0954 by Miri Beach RN  Outcome: Progressing  Goal: Readiness for Transition of Care  6/17/2024 0954 by Miri Beach RN  Outcome: Met  6/17/2024 0954 by Miri Beach RN  Outcome: Progressing

## 2024-06-18 ENCOUNTER — TELEPHONE (OUTPATIENT)
Dept: INFUSION THERAPY | Facility: HOSPITAL | Age: 69
End: 2024-06-18

## 2024-06-18 NOTE — TELEPHONE ENCOUNTER
6/18/24 [facility] POST INFUSION phone call:  Very pleased with care given.  All nurses were kind and supportive and kept me comfortable.  They answered my questions and addressed my concerns.  Good experience - felt very comfortable.

## 2024-06-24 ENCOUNTER — INFUSION (OUTPATIENT)
Dept: INFUSION THERAPY | Facility: HOSPITAL | Age: 69
End: 2024-06-24
Attending: INTERNAL MEDICINE
Payer: MEDICARE

## 2024-06-24 VITALS
BODY MASS INDEX: 39.21 KG/M2 | TEMPERATURE: 97 F | OXYGEN SATURATION: 100 % | HEART RATE: 80 BPM | HEIGHT: 63 IN | RESPIRATION RATE: 16 BRPM | DIASTOLIC BLOOD PRESSURE: 62 MMHG | WEIGHT: 221.31 LBS | SYSTOLIC BLOOD PRESSURE: 129 MMHG

## 2024-06-24 DIAGNOSIS — E53.8 B12 DEFICIENCY: Primary | ICD-10-CM

## 2024-06-24 DIAGNOSIS — D50.0 IRON DEFICIENCY ANEMIA DUE TO CHRONIC BLOOD LOSS: ICD-10-CM

## 2024-06-24 PROCEDURE — 63600175 PHARM REV CODE 636 W HCPCS: Mod: JZ | Performed by: INTERNAL MEDICINE

## 2024-06-24 PROCEDURE — 25000003 PHARM REV CODE 250: Performed by: INTERNAL MEDICINE

## 2024-06-24 PROCEDURE — 96365 THER/PROPH/DIAG IV INF INIT: CPT

## 2024-06-24 RX ORDER — SODIUM CHLORIDE 9 MG/ML
INJECTION, SOLUTION INTRAVENOUS CONTINUOUS
Status: DISCONTINUED | OUTPATIENT
Start: 2024-06-24 | End: 2024-06-24

## 2024-06-24 RX ORDER — DIPHENHYDRAMINE HYDROCHLORIDE 50 MG/ML
50 INJECTION INTRAMUSCULAR; INTRAVENOUS ONCE AS NEEDED
OUTPATIENT
Start: 2024-06-24

## 2024-06-24 RX ORDER — SODIUM CHLORIDE 0.9 % (FLUSH) 0.9 %
10 SYRINGE (ML) INJECTION
Status: CANCELLED | OUTPATIENT
Start: 2024-06-24

## 2024-06-24 RX ORDER — SODIUM CHLORIDE 9 MG/ML
INJECTION, SOLUTION INTRAVENOUS CONTINUOUS
Status: CANCELLED | OUTPATIENT
Start: 2024-06-24

## 2024-06-24 RX ORDER — HEPARIN 100 UNIT/ML
5 SYRINGE INTRAVENOUS
OUTPATIENT
Start: 2024-06-24

## 2024-06-24 RX ORDER — SODIUM CHLORIDE 0.9 % (FLUSH) 0.9 %
10 SYRINGE (ML) INJECTION
Status: DISCONTINUED | OUTPATIENT
Start: 2024-06-24 | End: 2024-06-24

## 2024-06-24 RX ORDER — EPINEPHRINE 0.3 MG/.3ML
0.3 INJECTION SUBCUTANEOUS ONCE AS NEEDED
OUTPATIENT
Start: 2024-06-24

## 2024-06-24 RX ADMIN — SODIUM CHLORIDE: 9 INJECTION, SOLUTION INTRAVENOUS at 10:06

## 2024-06-24 RX ADMIN — FERRIC CARBOXYMALTOSE INJECTION 750 MG: 50 INJECTION, SOLUTION INTRAVENOUS at 10:06

## 2024-06-24 NOTE — PLAN OF CARE
Problem: Adult Inpatient Plan of Care  Goal: Optimal Comfort and Wellbeing  Outcome: Met  Intervention: Provide Person-Centered Care  Flowsheets (Taken 6/24/2024 1871)  Trust Relationship/Rapport:   questions encouraged   choices provided   reassurance provided   emotional support provided   thoughts/feelings acknowledged   empathic listening provided   questions answered   care explained

## 2024-06-25 DIAGNOSIS — R76.0 ANTI-CARDIOLIPIN ANTIBODY POSITIVE: ICD-10-CM

## 2024-06-25 RX ORDER — APIXABAN 5 MG/1
5 TABLET, FILM COATED ORAL 2 TIMES DAILY
Qty: 60 TABLET | Refills: 7 | Status: SHIPPED | OUTPATIENT
Start: 2024-06-25

## 2024-07-22 ENCOUNTER — HOSPITAL ENCOUNTER (OUTPATIENT)
Dept: RADIOLOGY | Facility: HOSPITAL | Age: 69
Discharge: HOME | End: 2024-07-22
Attending: INTERNAL MEDICINE
Payer: MEDICARE

## 2024-07-22 DIAGNOSIS — R93.89 ABNORMAL CT OF THE CHEST: ICD-10-CM

## 2024-07-22 PROCEDURE — 71250 CT THORAX DX C-: CPT | Mod: 26,,, | Performed by: RADIOLOGY

## 2024-07-22 PROCEDURE — 71250 CT THORAX DX C-: CPT | Mod: TC,PO

## 2024-07-23 DIAGNOSIS — R91.8 LUNG MASS: Primary | ICD-10-CM

## 2024-07-26 ENCOUNTER — HOSPITAL ENCOUNTER (OUTPATIENT)
Dept: RADIOLOGY | Facility: HOSPITAL | Age: 69
Discharge: HOME | End: 2024-07-26
Attending: INTERNAL MEDICINE
Payer: MEDICARE

## 2024-07-26 VITALS — HEIGHT: 63 IN | BODY MASS INDEX: 39.34 KG/M2 | WEIGHT: 222 LBS

## 2024-07-26 DIAGNOSIS — R91.8 LUNG MASS: ICD-10-CM

## 2024-07-26 LAB — GLUCOSE SERPL-MCNC: 107 MG/DL (ref 70–110)

## 2024-07-26 PROCEDURE — 78815 PET IMAGE W/CT SKULL-THIGH: CPT | Mod: TC,PO

## 2024-07-26 PROCEDURE — A9552 F18 FDG: HCPCS | Mod: PO | Performed by: INTERNAL MEDICINE

## 2024-07-26 PROCEDURE — 78815 PET IMAGE W/CT SKULL-THIGH: CPT | Mod: 26,PI,, | Performed by: RADIOLOGY

## 2024-07-26 PROCEDURE — 82962 GLUCOSE BLOOD TEST: CPT | Mod: PO

## 2024-07-26 RX ORDER — FLUDEOXYGLUCOSE F18 500 MCI/ML
12.9 INJECTION INTRAVENOUS
Status: COMPLETED | OUTPATIENT
Start: 2024-07-26 | End: 2024-07-26

## 2024-07-26 RX ADMIN — FLUDEOXYGLUCOSE F-18 12.9 MILLICURIE: 500 INJECTION INTRAVENOUS at 07:07

## 2024-07-30 ENCOUNTER — OFFICE VISIT (OUTPATIENT)
Dept: PULMONOLOGY | Facility: CLINIC | Age: 69
End: 2024-07-30
Payer: MEDICARE

## 2024-07-30 VITALS
HEART RATE: 75 BPM | SYSTOLIC BLOOD PRESSURE: 130 MMHG | BODY MASS INDEX: 39.18 KG/M2 | DIASTOLIC BLOOD PRESSURE: 72 MMHG | WEIGHT: 221.19 LBS | OXYGEN SATURATION: 96 %

## 2024-07-30 DIAGNOSIS — R09.02 HYPOXEMIA: ICD-10-CM

## 2024-07-30 DIAGNOSIS — Z87.891 PERSONAL HISTORY OF TOBACCO USE, PRESENTING HAZARDS TO HEALTH: ICD-10-CM

## 2024-07-30 DIAGNOSIS — J44.9 CHRONIC OBSTRUCTIVE PULMONARY DISEASE, UNSPECIFIED COPD TYPE: Primary | ICD-10-CM

## 2024-07-30 DIAGNOSIS — R93.89 ABNORMAL CT OF THE CHEST: ICD-10-CM

## 2024-07-30 NOTE — PROGRESS NOTES
Office Visit Note *    Patient Name: Clarita Gómez  MRN: [id number]  : 1955      Reason for visit: COPD    HPI:     3/31/2021 - 64 yo female diagnosed with COPD a few years ago (unsure of severity) currently on ICS/LABA (changed from BREO to SYMBICORT) referred here for evaluation.  Has chronic SOB, BOWERS, intermittent wheezing.  She continues to smoke, currently 1/2 PPD (smoked about 2 PPD for 40+ years).  She states that she has not been able to fully quit.  Has never had a LDSCT chest (has a brother with h/o lung cancer).  Also has a h/o anticardiolipin antibody and elevated factor VIII and has been on chronic anticoagulation (sees Dr Mayer).  ROS as below.    5/10/2021 - Here for follow up, reviewed PFT with pt (FEV1 - 30%, DLCO - 12%), walk test (showed need fpr 2 LPM) and CT scan.  We will plan to change to TRELEGY (sample given and instructed).  She got Covid vaccine (Moderna)    2021 - Here for follow up, Pt is currently stable on present medications with no recent increases in their symptoms or use of rescue medications.  Since our last visit there have been no hospitalizations or ER visits for their respiratory issues and there does not seem to be anything to suggest unrecognized exacerbations.  I have reviewed the medical regimen and re-educated the pt on the role of rescue and controlling medications.  Inhaler technique and understanding seems adequate.  The patient reports no issues with any of there medications for their COPD.  Refills will be taken care of as needed.  All questions answered.  Patient has no known corona virus exposures and has been practicing social distancing.  We have discussed the virus and precautions and all questions have been answered.  We did have a discussion about booster shots.  She had a son who had Covid back in March but she did not get it then.  Still smoking about 1/2 PPD - we discussed this.    11/15/2021 - Here for follow up, Pt is currently stable on  "present medications with no recent increases in their symptoms or use of rescue medications.  Since our last visit there have been no hospitalizations or ER visits for their respiratory issues and there does not seem to be anything to suggest unrecognized exacerbations.  I have reviewed the medical regimen and re-educated the pt on the role of rescue and controlling medications.  Inhaler technique and understanding seems adequate.  The patient reports no issues with any of there medications for their COPD.  Refills will be taken care of as needed.  All questions answered.  Doesn't feel that TRELEGY "holds her" all day and wants to try BREZTRI to see if twice daily dosing helps.  Still smoking 10 cigarettes per day (we discussed this).  Has some dysphagia and is to see her PCP.  Patient has no known corona virus exposures and has been practicing social distancing.  We have discussed the virus and precautions and all questions have been answered.  Has had Covid vaccine (Moderna and plans to get booster)    8/31/2022 - Here for follow up, she felt that she did better on BREZTRI but insurance wouldn't cover and she is back on TRELEGY  but doesn't feel that she is "covered" for the whole day.  Pt is currently stable on present medications with no recent increases in their symptoms or use of rescue medications.  Since our last visit there have been no hospitalizations or ER visits for their respiratory issues and there does not seem to be anything to suggest unrecognized exacerbations.  I have reviewed the medical regimen and re-educated the pt on the role of rescue and controlling medications.  Inhaler technique and understanding seems adequate.  The patient reports no issues with any of there medications for their COPD.  Refills will be taken care of as needed.  All questions answered.  Still smoking about 10 cigarettes per day, did try to stop but didn't tolerate it.   Dysphagia is about the same - choking on stringy " meats (we discussed this) and I have recommended that she see a GI MD.  Patient has no known corona virus exposures and has been practicing social distancing.  We have discussed the virus and precautions and all questions have been answered.  She has had Covid vaccine and 2 boosters    1/26/2023 - Here for follow up, Pt is currently stable on present medications with no recent increases in their symptoms or use of rescue medications.  Since our last visit there have been no hospitalizations or ER visits for their respiratory issues and there does not seem to be anything to suggest unrecognized exacerbations.  I have reviewed the medical regimen and re-educated the pt on the role of rescue and controlling medications.  Inhaler technique and understanding seems adequate.  The patient reports no issues with any of there medications for their COPD.  Refills will be taken care of as needed.  All questions answered.  Still about 10 cigarettes per day and I have encouraged her to continue to try and stop.  She had 2nd shingles vaccine and has significant swelling and pain in her left arm (we discussed this).  She had Covid mid December and had symptoms for about 2 weeks and was treated with paxlovid.    4/27/2023 - Here for follow up, Pt is currently stable on present medications with no recent increases in their symptoms or use of rescue medications.  Since our last visit there have been no hospitalizations or ER visits for their respiratory issues and there does not seem to be anything to suggest unrecognized exacerbations.  I have reviewed the medical regimen and re-educated the pt on the role of rescue and controlling medications.  Inhaler technique and understanding seems adequate.  The patient reports no issues with any of there medications for their COPD.  Refills will be taken care of as needed.  All questions answered.  Still smoking about 1/2 PPD and has been trying to quit but not much luck yet.  No recent  issues with Covid.    7/27/2023 - here for follow up, Pt is currently stable on present medications with no recent increases in their symptoms or use of rescue medications.  Since our last visit there have been no hospitalizations or ER visits for their respiratory issues and there does not seem to be anything to suggest unrecognized exacerbations.  I have reviewed the medical regimen and re-educated the pt on the role of rescue and controlling medications.  Inhaler technique and understanding seems adequate.  The patient reports no issues with any of there medications for their COPD.  Refills will be taken care of as needed.  All questions answered.  But has noted some increased issues with allergies and congestion with the current heat.  Still smoking about 10 cigarettes/d (improved from about 2 PPD) - we discussed this.  Last ferritin is low and Dr Mayer is addressing this.    10/25/2023 - Overall sable but here to review recent CT scan.  We reviewed the images and will plan a repeat CT in 3 months.  All questions answered.  No new respiratory complaints.    1/24/2024 - Here for follow up, Pt is currently stable on present medications with no recent increases in their symptoms or use of rescue medications.  Since our last visit there have been no hospitalizations or ER visits for their respiratory issues and there does not seem to be anything to suggest unrecognized exacerbations.  I have reviewed the medical regimen and re-educated the pt on the role of rescue and controlling medications.  Inhaler technique and understanding seems adequate.  The patient reports no issues with any of there medications for their COPD.  Refills will be taken care of as needed.  All questions answered.  Had follow up CT chest which is stable and will     7/30/2024 - Here for follow up, Pt is currently stable on present medications with no recent increases in their symptoms or use of rescue medications.  Since our last visit there  have been no hospitalizations or ER visits for their respiratory issues and there does not seem to be anything to suggest unrecognized exacerbations.  I have reviewed the medical regimen and re-educated the pt on the role of rescue and controlling medications.  Inhaler technique and understanding seems adequate.  The patient reports no issues with any of there medications for their COPD.  Refills will be taken care of as needed.  All questions answered.  Had CT chest with new RUL nodule, PET scan showed inimal activity and we reviewed the scans and the area looks more inflammatory to me.  We will plan repeat CT in 6 months.  She is still smoking and has increased to 1 PPD - we discussed this and hhow this could be related to the Ct findings.  No recent hospitalization or ER visits.  No other concerns or problems.        COPD Flowsheet    GOLD III D   Last PFT - 4/21  FEV1- 30 % DLCO - 12 %     + ICS/LABA/LAMA    + prn JAYLA    mMRC -  0 - SOB with strenuous exercise   -  1 - SOB level ground, slight hill   -  2 - SOB walk slower or stop for breath level ground   - + 3 - SOB at 100 yards or after few minutes   -  4 - SOB in house, dressing    Referral to PULMONARY REHABILITATION -  NO    Vitamin D tested - no    Testosterone checked (male) - na    Tested for alpha-1-antitrypsin - NO      Low Dose Screening CT Chest    Cigarettes - 2 PPD x 40 YEARS  Still smoking -  YES  QUIT no    Date of last LD CT - 4/21    Result - scarring - needs repeat now    I have discussed with pt about using a screening CT of the chest due to history of cigarette smoking.  We have discussed the possible findings and possible actions as a result of these findings.  The pt would like to proceed.    Vaccination Status    Flu vaccination status - 22/23    Pneumonia vaccination status - +    Covid vaccination status - + (Moderna) and booster x 2    Tetanus/diptheria vaccination status - unsure    Shingles vaccination status - No    Home Oxygen  Qualification    Qualifier - O2 sat 87 on room air  (,exertion    Date - 4/2021          O2 sat 94% on 2 LPM via nasal cannuli    DX - COPD    Face to face visit with pt concerning the need for O2 therapy, all questions answered.  I stressed the need for compliance.  Pt to call with any questions.              Past Medical History    Past Medical History:   Diagnosis Date    Anticardiolipin antibody positive     COPD (chronic obstructive pulmonary disease)     Hyperlipidemia     Hypothyroid     Personal history of tobacco use, presenting hazards to health     Splenic infarct        Past Surgical History    Past Surgical History:   Procedure Laterality Date    CHOLECYSTECTOMY      COLECTOMY      partial due to diverticulosis    COLONOSCOPY N/A 12/28/2023    Procedure: COLONOSCOPY;  Surgeon: Magdi Adames MD;  Location: [facility];  Service: Endoscopy;  Laterality: N/A;    HYSTERECTOMY      LAPAROSCOPIC REPAIR OF VENTRAL HERNIA      SMALL BOWEL ENTEROSCOPY N/A 12/28/2023    Procedure: PUSH ENTEROSCOPY;  Surgeon: Magdi Adames MD;  Location: [facility];  Service: Endoscopy;  Laterality: N/A;       Medications      Current Outpatient Medications:     acetaminophen (TYLENOL) 500 MG tablet, Take 500 mg by mouth every 6 (six) hours as needed for Pain., Disp: , Rfl:     albuterol (PROVENTIL) 2.5 mg /3 mL (0.083 %) nebulizer solution, Take 2.5 mg by nebulization every 6 (six) hours as needed for Wheezing. Rescue, Disp: , Rfl:     albuterol (PROVENTIL/VENTOLIN HFA) 90 mcg/actuation inhaler, Inhale 2 puffs into the lungs every 4 (four) hours as needed for Wheezing. Rescue, Disp: 8.5 g, Rfl: 11    albuterol (VENTOLIN HFA) 90 mcg/actuation inhaler, Inhale 2 puffs into the lungs every 6 (six) hours as needed for Wheezing. Rescue, Disp: 18 g, Rfl: 3    albuterol (VENTOLIN HFA) 90 mcg/actuation inhaler, Inhale 2 puffs into the lungs every 6 (six) hours as needed for Wheezing. Rescue, Disp: 18 g, Rfl: 11    ALBUTEROL INHL,  "Inhale into the lungs., Disp: , Rfl:     cyanocobalamin 1,000 mcg/mL injection, Inject 1 ml B 12 subq q 2 weeks., Disp: 6 mL, Rfl: 4    diclofenac sodium (VOLTAREN) 1 % Gel, , Disp: , Rfl:     ELIQUIS 5 mg Tab, TAKE ONE TABLET BY MOUTH TWO TIMES A DAY, Disp: 60 tablet, Rfl: 7    enoxaparin (LOVENOX) 40 mg/0.4 mL Syrg, Inject into the skin., Disp: , Rfl:     levothyroxine (SYNTHROID) 112 MCG tablet, levothyroxine 112 mcg tablet  Take 1 tablet every day by oral route., Disp: , Rfl:     ondansetron (ZOFRAN-ODT) 4 MG TbDL, Take 4 mg by mouth., Disp: , Rfl:     pravastatin (PRAVACHOL) 40 MG tablet, pravastatin 40 mg tablet  Take 1 tablet every day by oral route for 90 days., Disp: , Rfl:     prenatal 105-iron-folic ac-dha 30 mg iron- 1.4 mg-300 mg Cmpk, Take by mouth., Disp: , Rfl:     syringe with needle (TUBERCULIN SYRINGE) 1 mL 27 x 1/2" Syrg, Use for B 12 subq monthly injections., Disp: 6 each, Rfl: 4    syringe with needle, safety 3 mL 25 gauge x 5/8" Syrg, 1 Syringe by Misc.(Non-Drug; Combo Route) route every 30 days., Disp: 3 each, Rfl: 4    TRELEGY ELLIPTA 100-62.5-25 mcg DsDv, INHALE 1 PUFF INTO THE LUNGS ONCE DAILY, Disp: 60 each, Rfl: 11    Allergies    Review of patient's allergies indicates:   Allergen Reactions    Zosyn [piperacillin-tazobactam] Swelling     Swelling to lips       SocHx    Social History     Tobacco Use   Smoking Status Every Day    Current packs/day: 0.50    Average packs/day: 0.5 packs/day for 40.0 years (20.0 ttl pk-yrs)    Types: Cigarettes   Smokeless Tobacco Never   Tobacco Comments    currently smoking 1/2 PPD    Smokes 10 cigarettes daily       Social History     Substance and Sexual Activity   Alcohol Use No       Drug Use - no  Occupation - retired   Asbestos exposure - no  Pets - na    FMHx    Family History   Problem Relation Name Age of Onset    Breast cancer Maternal Aunt      Heart disease Father      Ovarian cancer Sister      Lung cancer Brother           Review of " Systems  Review of Systems   Constitutional:  Positive for malaise/fatigue. Negative for chills, diaphoresis, fever and weight loss.   HENT:  Negative for congestion, ear discharge, ear pain, hearing loss, nosebleeds, sinus pain, sore throat and tinnitus.    Eyes:  Negative for pain.   Respiratory:  Positive for cough, shortness of breath and wheezing. Negative for hemoptysis, sputum production and stridor.    Cardiovascular:  Positive for chest pain (intermittent sharp pain) and leg swelling (later in day). Negative for palpitations, orthopnea, claudication and PND.   Gastrointestinal:  Negative for abdominal pain, blood in stool, constipation, diarrhea, heartburn, melena, nausea and vomiting.   Genitourinary:  Negative for dysuria, frequency, hematuria and urgency.   Musculoskeletal:  Positive for back pain. Negative for falls and myalgias.   Neurological:  Positive for weakness. Negative for dizziness, tingling, tremors, sensory change, speech change, focal weakness, seizures, loss of consciousness and headaches.   Psychiatric/Behavioral:  Negative for depression, substance abuse and suicidal ideas. The patient is not nervous/anxious.        Physical Exam    Vitals:    07/30/24 1058   BP: 130/72   BP Location: Left arm   Patient Position: Sitting   BP Method: Medium (Manual)   Pulse: 75   SpO2: 96%   Weight: 100.3 kg (221 lb 3.2 oz)       Physical Exam  Vitals and nursing note reviewed.   Constitutional:       General: She is not in acute distress.     Appearance: Normal appearance. She is well-developed. She is not ill-appearing, toxic-appearing or diaphoretic.   HENT:      Head: Normocephalic and atraumatic.      Right Ear: External ear normal.      Left Ear: External ear normal.      Nose: Nose normal.   Eyes:      General: No scleral icterus.        Right eye: No discharge.         Left eye: No discharge.      Conjunctiva/sclera: Conjunctivae normal.      Pupils: Pupils are equal, round, and reactive to  light.   Neck:      Thyroid: No thyromegaly.      Vascular: No carotid bruit or JVD.      Trachea: No tracheal deviation.   Cardiovascular:      Rate and Rhythm: Regular rhythm. Tachycardia present.      Pulses: Normal pulses.      Heart sounds: Normal heart sounds. No murmur heard.     No friction rub. No gallop.   Pulmonary:      Effort: Pulmonary effort is normal. No respiratory distress.      Breath sounds: No stridor. Wheezing (few expiratory wheezes) present. No rhonchi or rales.      Comments: Decreased BS throughout  Chest:      Chest wall: No tenderness.   Abdominal:      General: Bowel sounds are normal. There is no distension.      Palpations: Abdomen is soft.      Tenderness: There is no abdominal tenderness. There is no guarding.   Musculoskeletal:         General: No tenderness. Normal range of motion.      Cervical back: Normal range of motion and neck supple. No rigidity or tenderness.      Right lower leg: No edema.      Left lower leg: No edema.   Lymphadenopathy:      Cervical: No cervical adenopathy.   Skin:     General: Skin is warm and dry.   Neurological:      General: No focal deficit present.      Mental Status: She is alert and oriented to person, place, and time. Mental status is at baseline.      Cranial Nerves: No cranial nerve deficit.   Psychiatric:         Mood and Affect: Mood normal.         Behavior: Behavior normal.         Thought Content: Thought content normal.         Judgment: Judgment normal.         Labs    Lab Results   Component Value Date    WBC 8.68 06/05/2024    HGB 14.5 06/05/2024    HCT 45.1 06/05/2024     06/05/2024       Sodium   Date Value Ref Range Status   06/05/2024 140 136 - 145 mmol/L Final   04/11/2019 136 134 - 144 mmol/L      Potassium   Date Value Ref Range Status   06/05/2024 4.3 3.5 - 5.1 mmol/L Final     Chloride   Date Value Ref Range Status   06/05/2024 105 95 - 110 mmol/L Final   04/11/2019 97 (L) 98 - 110 mmol/L      CO2   Date Value Ref  Range Status   06/05/2024 25 23 - 29 mmol/L Final     Glucose   Date Value Ref Range Status   06/05/2024 85 70 - 110 mg/dL Final   04/11/2019 87 70 - 99 mg/dL      BUN   Date Value Ref Range Status   06/05/2024 9 8 - 23 mg/dL Final     Creatinine   Date Value Ref Range Status   06/05/2024 0.9 0.5 - 1.4 mg/dL Final   04/11/2019 0.86 0.60 - 1.40 mg/dL      Calcium   Date Value Ref Range Status   06/05/2024 9.9 8.7 - 10.5 mg/dL Final     Total Protein   Date Value Ref Range Status   06/05/2024 7.6 6.0 - 8.4 g/dL Final     Albumin   Date Value Ref Range Status   06/05/2024 4.4 3.5 - 5.2 g/dL Final   04/11/2019 4.4 3.1 - 4.7 g/dL      Total Bilirubin   Date Value Ref Range Status   06/05/2024 0.3 0.1 - 1.0 mg/dL Final     Comment:     For infants and newborns, interpretation of results should be based  on gestational age, weight and in agreement with clinical  observations.    Premature Infant recommended reference ranges:  Up to 24 hours.............<8.0 mg/dL  Up to 48 hours............<12.0 mg/dL  3-5 days..................<15.0 mg/dL  6-29 days.................<15.0 mg/dL       Alkaline Phosphatase   Date Value Ref Range Status   06/05/2024 53 (L) 55 - 135 U/L Final     AST   Date Value Ref Range Status   06/05/2024 24 10 - 40 U/L Final     ALT   Date Value Ref Range Status   06/05/2024 20 10 - 44 U/L Final     Anion Gap   Date Value Ref Range Status   06/05/2024 10 8 - 16 mmol/L Final       Xrays    CT chest (10/19/23)  Emphysematous changes with new noncalcified nodules in the right upper lobe the largest measuring 7 x 6 mm.  Atelectasis in the lingula and right middle lobe  Stable faint tiny subpleural peripheral groundglass nodular opacities in the upper lobes  LUNG-RADS CATEGORY 4A: SUSPICIOUS FINDINGS  RECOMMENDATION: 3 month follow-up LDCT (PET/CT may be considered when solid component greater than 7 mm is present    CT chest (1/19/24)  Scattered pulmonary nodules in the lungs, without adverse interval change  "from the previous exam. Consider continued interval follow-up to document stability.     Impression/Plan    Problem List Items Addressed This Visit          Pulmonary    COPD (chronic obstructive pulmonary disease) - Primary     Continue present medications she liked BREZTRI but insurance wouldn't cover  Will refill medications as needed.  Instructed patient to contact us with any issues concerning their medications (cost, reactions, etc.).  Have discussed with patient about inciting conditions which may exacerbate their disease.  We did discuss possible new therapies or de-escalation of therapy (if appropriate).  Asked patient if they were interested in pursuing pulmonary rehabilitation.  All questions answered  Patient instructed that they are to call if symptoms change or new issues develop prior to their next visit.  RTC 6 month           Hypoxemia     Continue with home O2            Other    Personal history of tobacco use, presenting hazards to health     Currently smoking 1  packs per day> 50 pack years  I have counseled pt for 3-5 minutes regarding cigarette cessation.  This has included the need to stop smoking as well as strategies, including but not limited to "cold turkey", CHANTIX (including risks and benefits of the drug), nicotine replacement and WELLBUTRIN.           Abnormal CT of the chest     As above  Recheck Ct chest in 6 months         Relevant Orders    CT Chest Without Contrast                     Alexys Jackson MD                    "

## 2024-07-30 NOTE — ASSESSMENT & PLAN NOTE
"Currently smoking 1  packs per day> 50 pack years  I have counseled pt for 3-5 minutes regarding cigarette cessation.  This has included the need to stop smoking as well as strategies, including but not limited to "cold turkey", CHANTIX (including risks and benefits of the drug), nicotine replacement and WELLBUTRIN.    "

## 2024-09-03 DIAGNOSIS — J44.9 CHRONIC OBSTRUCTIVE PULMONARY DISEASE, UNSPECIFIED COPD TYPE: Primary | ICD-10-CM

## 2024-09-03 RX ORDER — ALBUTEROL SULFATE 90 UG/1
2 INHALANT RESPIRATORY (INHALATION) EVERY 6 HOURS PRN
Qty: 18 G | Refills: 11 | Status: SHIPPED | OUTPATIENT
Start: 2024-09-03

## 2024-09-03 NOTE — TELEPHONE ENCOUNTER
----- Message from Elly Tyson sent at 9/3/2024  9:21 AM CDT -----  Regarding: Refill  The patient stated she needs refill on her albuterol medication. Please assist and thanks for your assistance.

## 2024-09-04 DIAGNOSIS — J44.9 CHRONIC OBSTRUCTIVE PULMONARY DISEASE, UNSPECIFIED COPD TYPE: Primary | ICD-10-CM

## 2024-09-04 RX ORDER — ALBUTEROL SULFATE 90 UG/1
2 INHALANT RESPIRATORY (INHALATION) EVERY 6 HOURS PRN
Qty: 18 G | Refills: 11 | Status: SHIPPED | OUTPATIENT
Start: 2024-09-04

## 2024-10-16 ENCOUNTER — LAB VISIT (OUTPATIENT)
Dept: LAB | Facility: HOSPITAL | Age: 69
End: 2024-10-16
Attending: INTERNAL MEDICINE
Payer: MEDICARE

## 2024-10-16 DIAGNOSIS — E53.8 B12 DEFICIENCY: ICD-10-CM

## 2024-10-16 DIAGNOSIS — D50.0 IRON DEFICIENCY ANEMIA DUE TO CHRONIC BLOOD LOSS: ICD-10-CM

## 2024-10-16 LAB
BASOPHILS # BLD AUTO: 0.06 K/UL (ref 0–0.2)
BASOPHILS NFR BLD: 0.6 % (ref 0–1.9)
DIFFERENTIAL METHOD BLD: ABNORMAL
EOSINOPHIL # BLD AUTO: 0.3 K/UL (ref 0–0.5)
EOSINOPHIL NFR BLD: 2.7 % (ref 0–8)
ERYTHROCYTE [DISTWIDTH] IN BLOOD BY AUTOMATED COUNT: 12.1 % (ref 11.5–14.5)
FERRITIN SERPL-MCNC: 137.4 NG/ML (ref 20–300)
HCT VFR BLD AUTO: 47.4 % (ref 37–48.5)
HGB BLD-MCNC: 15.6 G/DL (ref 12–16)
IMM GRANULOCYTES # BLD AUTO: 0.03 K/UL (ref 0–0.04)
IMM GRANULOCYTES NFR BLD AUTO: 0.3 % (ref 0–0.5)
LYMPHOCYTES # BLD AUTO: 2.7 K/UL (ref 1–4.8)
LYMPHOCYTES NFR BLD: 29 % (ref 18–48)
MCH RBC QN AUTO: 31.8 PG (ref 27–31)
MCHC RBC AUTO-ENTMCNC: 32.9 G/DL (ref 32–36)
MCV RBC AUTO: 97 FL (ref 82–98)
MONOCYTES # BLD AUTO: 0.7 K/UL (ref 0.3–1)
MONOCYTES NFR BLD: 7.6 % (ref 4–15)
NEUTROPHILS # BLD AUTO: 5.7 K/UL (ref 1.8–7.7)
NEUTROPHILS NFR BLD: 59.8 % (ref 38–73)
NRBC BLD-RTO: 0 /100 WBC
PLATELET # BLD AUTO: 284 K/UL (ref 150–450)
PMV BLD AUTO: 9.8 FL (ref 9.2–12.9)
RBC # BLD AUTO: 4.9 M/UL (ref 4–5.4)
WBC # BLD AUTO: 9.46 K/UL (ref 3.9–12.7)

## 2024-10-16 PROCEDURE — 85025 COMPLETE CBC W/AUTO DIFF WBC: CPT | Performed by: INTERNAL MEDICINE

## 2024-10-16 PROCEDURE — 82728 ASSAY OF FERRITIN: CPT | Performed by: INTERNAL MEDICINE

## 2024-10-16 PROCEDURE — 36415 COLL VENOUS BLD VENIPUNCTURE: CPT | Performed by: INTERNAL MEDICINE

## 2024-10-21 ENCOUNTER — OFFICE VISIT (OUTPATIENT)
Facility: CLINIC | Age: 69
End: 2024-10-21
Payer: MEDICARE

## 2024-10-21 VITALS
RESPIRATION RATE: 18 BRPM | TEMPERATURE: 98 F | DIASTOLIC BLOOD PRESSURE: 80 MMHG | HEART RATE: 83 BPM | BODY MASS INDEX: 37.81 KG/M2 | HEIGHT: 64 IN | WEIGHT: 221.5 LBS | SYSTOLIC BLOOD PRESSURE: 197 MMHG

## 2024-10-21 DIAGNOSIS — E53.8 B12 DEFICIENCY: ICD-10-CM

## 2024-10-21 DIAGNOSIS — D50.0 IRON DEFICIENCY ANEMIA DUE TO CHRONIC BLOOD LOSS: Primary | ICD-10-CM

## 2024-10-21 DIAGNOSIS — I72.8 SPLENIC ARTERY ANEURYSM: ICD-10-CM

## 2024-10-21 DIAGNOSIS — R76.0 ANTI-CARDIOLIPIN ANTIBODY POSITIVE: ICD-10-CM

## 2024-10-21 DIAGNOSIS — D73.5 SPLENIC INFARCT: ICD-10-CM

## 2024-10-21 PROCEDURE — 99215 OFFICE O/P EST HI 40 MIN: CPT | Mod: S$PBB,,, | Performed by: INTERNAL MEDICINE

## 2024-10-21 PROCEDURE — 99999 PR PBB SHADOW E&M-EST. PATIENT-LVL IV: CPT | Mod: PBBFAC,,, | Performed by: INTERNAL MEDICINE

## 2024-10-21 PROCEDURE — 99214 OFFICE O/P EST MOD 30 MIN: CPT | Mod: PBBFAC,PN | Performed by: INTERNAL MEDICINE

## 2024-10-21 NOTE — LETTER
October 25, 2024        Bassam Givens MD  [address] [place] [address]             [facility]  [address]  [address]  [place] [address]  Phone: [phone]  Fax: [phone]   Patient: Clarita Gómez   MR Number: [id number]   YOB: 1955   Date of Visit: 10/21/2024       Dear Dr. Givens:    Thank you for referring Clarita Gómez to me for evaluation. Below are the relevant portions of my assessment and plan of care.            If you have questions, please do not hesitate to call me. I look forward to following Clarita along with you.    Sincerely,      GUERO Mayer MD           CC    No Recipients

## 2024-10-25 NOTE — PROGRESS NOTES
[facility] ONCOLOGY [address] hematology Oncology in office subsequent encounter note  10/21/24  Bassam Givens MD, Hazel Tavera        Presented with LUQ pain.  Had splenic artery thrombus with splenic infarct.  On Eliquis 5mg BID.  No LUQ pain sx now.  Coagulation evaluation increased Factor 8 and increased Anti cardiolipid IgM antibody levels.  Persistently increased on repeat determination.  Estimate 30% risk of clot event over next 2-3 years.  Continue Eliquis 5mg BID therapeutic and prophylaxis.    She admits to blurred vision and dizzyness intermittently, sx predated the splenic infarct and have not changed while on Eliquis.    History of COPD.  On 02 24/7.  3 l/m.    Re check her CAT of abdomen  regards status of splenic infarct showed resolution of splenic infarct area. 2019.  Splenic aneurism stable since 2019.    Fe deficiency anemia, Heme positive stools.  After Fe replenishment, energy increased 10/10.   She received Ferrlicet x's 8 x'2.  Energy 5/10.  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  Check again in 3 years.    CT of chest stable pulmonary nodules.  Check again in 6 months.    Continue Eliquis BID.      She is seen nurse practitioner Gabriel for general care and for thyroid management.     She saw her cardiologist, Dr. Oliva, due for chemical stress test.    Her son had COVID, but was asymptomatic.  She did not have symptoms and isolated herself.    She has had 2 of 2 vaccines and the booster shot and the flu shot.  She received the pneumonia vaccination 2 years ago.  She has not taken the shingles vaccine.    She has COPD and is on oxygen 2 liters/minute 24/7.     No acute distress, does not appear chronically ill.  She does not have palpable lymphadenopathy.  Her lungs are clear bilaterally.  She has regular rhythm without murmur.  Abdomen is flat without distention, abdomen is nontender without palpable hepatomegaly or  splenomegaly or abdominal mass.  Calves are nontender without edema, no petechiae or purpura.  Neurologically grossly intact    History of splenic infarct with  Increased factor 8 at 250 and increased anticardiolipin antibody at 13.  Now.    Continue Eliquis 5 mg b.i.d..  Return to clinic in 6 months.  Fe deficiency anemia, B 12 deficiency anemia    CT of chest screening, 6-7 mm nodules seen, follow up in 6 months.  CT of abdomin spleen NL, splenic aneurism unchanged at 8 mm, stable, since 2019.    Hgb 14.4, B 12 315-353.  Stool heme + 2/3 specimens.  Dr. Adames, AVMs, polyps, diverticular dx.    Fe deficiency anemia,  B 12 deficiency anemia, AVM's cauterized.  After Fe infusions, Hgb 15.6 and ferritin now 137.  She continues on B 12 monthly.    Hypercoagulation Syndrome, Anticardiolipin AB +, Factor 8 increased,  S/P splenic infarct.  Splenic artery aneurism stable.    RTC 6 months with lab.

## 2025-01-01 ENCOUNTER — HOSPITAL ENCOUNTER (INPATIENT)
Facility: HOSPITAL | Age: 70
LOS: 1 days | Discharge: HOSPICE | DRG: 190 | End: 2025-02-01
Attending: EMERGENCY MEDICINE | Admitting: FAMILY MEDICINE
Payer: MEDICARE

## 2025-01-01 ENCOUNTER — HOSPITAL ENCOUNTER (INPATIENT)
Facility: HOSPITAL | Age: 70
LOS: 3 days | DRG: 951 | End: 2025-02-04
Attending: STUDENT IN AN ORGANIZED HEALTH CARE EDUCATION/TRAINING PROGRAM | Admitting: STUDENT IN AN ORGANIZED HEALTH CARE EDUCATION/TRAINING PROGRAM
Payer: OTHER MISCELLANEOUS

## 2025-01-01 VITALS
BODY MASS INDEX: 38.21 KG/M2 | HEART RATE: 106 BPM | WEIGHT: 215.63 LBS | TEMPERATURE: 98 F | SYSTOLIC BLOOD PRESSURE: 157 MMHG | OXYGEN SATURATION: 88 % | HEIGHT: 63 IN | DIASTOLIC BLOOD PRESSURE: 71 MMHG | RESPIRATION RATE: 11 BRPM

## 2025-01-01 VITALS
SYSTOLIC BLOOD PRESSURE: 103 MMHG | OXYGEN SATURATION: 51 % | RESPIRATION RATE: 20 BRPM | HEART RATE: 109 BPM | TEMPERATURE: 99 F | DIASTOLIC BLOOD PRESSURE: 51 MMHG

## 2025-01-01 DIAGNOSIS — R06.02 SHORTNESS OF BREATH: ICD-10-CM

## 2025-01-01 DIAGNOSIS — J44.1 COPD EXACERBATION: Primary | ICD-10-CM

## 2025-01-01 DIAGNOSIS — J96.21 ACUTE ON CHRONIC RESPIRATORY FAILURE WITH HYPOXIA: ICD-10-CM

## 2025-01-01 LAB
ALBUMIN SERPL BCP-MCNC: 3.8 G/DL (ref 3.5–5.2)
ALLENS TEST: ABNORMAL
ALLENS TEST: ABNORMAL
ALP SERPL-CCNC: 43 U/L (ref 55–135)
ALT SERPL W/O P-5'-P-CCNC: 18 U/L (ref 10–44)
ANION GAP SERPL CALC-SCNC: 10 MMOL/L (ref 8–16)
ANION GAP SERPL CALC-SCNC: 11 MMOL/L (ref 8–16)
ANION GAP SERPL CALC-SCNC: 6 MMOL/L (ref 8–16)
AST SERPL-CCNC: 20 U/L (ref 10–40)
BASOPHILS # BLD AUTO: 0.03 K/UL (ref 0–0.2)
BASOPHILS # BLD AUTO: 0.03 K/UL (ref 0–0.2)
BASOPHILS # BLD AUTO: 0.05 K/UL (ref 0–0.2)
BASOPHILS NFR BLD: 0.1 % (ref 0–1.9)
BASOPHILS NFR BLD: 0.1 % (ref 0–1.9)
BASOPHILS NFR BLD: 0.5 % (ref 0–1.9)
BILIRUB SERPL-MCNC: 0.3 MG/DL (ref 0.1–1)
BUN SERPL-MCNC: 15 MG/DL (ref 8–23)
BUN SERPL-MCNC: 23 MG/DL (ref 8–23)
BUN SERPL-MCNC: 8 MG/DL (ref 8–23)
CALCIUM SERPL-MCNC: 9.1 MG/DL (ref 8.7–10.5)
CALCIUM SERPL-MCNC: 9.7 MG/DL (ref 8.7–10.5)
CALCIUM SERPL-MCNC: 9.8 MG/DL (ref 8.7–10.5)
CHLORIDE SERPL-SCNC: 103 MMOL/L (ref 95–110)
CHLORIDE SERPL-SCNC: 99 MMOL/L (ref 95–110)
CHLORIDE SERPL-SCNC: 99 MMOL/L (ref 95–110)
CO2 SERPL-SCNC: 29 MMOL/L (ref 23–29)
CO2 SERPL-SCNC: 30 MMOL/L (ref 23–29)
CO2 SERPL-SCNC: 32 MMOL/L (ref 23–29)
CREAT SERPL-MCNC: 0.7 MG/DL (ref 0.5–1.4)
DELSYS: ABNORMAL
DELSYS: ABNORMAL
DIFFERENTIAL METHOD BLD: ABNORMAL
DIFFERENTIAL METHOD BLD: ABNORMAL
DIFFERENTIAL METHOD BLD: NORMAL
EOSINOPHIL # BLD AUTO: 0 K/UL (ref 0–0.5)
EOSINOPHIL # BLD AUTO: 0 K/UL (ref 0–0.5)
EOSINOPHIL # BLD AUTO: 0.1 K/UL (ref 0–0.5)
EOSINOPHIL NFR BLD: 0 % (ref 0–8)
EOSINOPHIL NFR BLD: 0 % (ref 0–8)
EOSINOPHIL NFR BLD: 0.6 % (ref 0–8)
EP: 6
EP: 8
ERYTHROCYTE [DISTWIDTH] IN BLOOD BY AUTOMATED COUNT: 12.3 % (ref 11.5–14.5)
ERYTHROCYTE [DISTWIDTH] IN BLOOD BY AUTOMATED COUNT: 12.6 % (ref 11.5–14.5)
ERYTHROCYTE [DISTWIDTH] IN BLOOD BY AUTOMATED COUNT: 12.7 % (ref 11.5–14.5)
ERYTHROCYTE [SEDIMENTATION RATE] IN BLOOD BY WESTERGREN METHOD: 18 MM/H
EST. GFR  (NO RACE VARIABLE): >60 ML/MIN/1.73 M^2
FIO2: 40
FIO2: 40
GLUCOSE SERPL-MCNC: 106 MG/DL (ref 70–110)
GLUCOSE SERPL-MCNC: 140 MG/DL (ref 70–110)
GLUCOSE SERPL-MCNC: 145 MG/DL (ref 70–110)
GLUCOSE SERPL-MCNC: 192 MG/DL (ref 70–110)
HCO3 UR-SCNC: 28 MMOL/L (ref 24–28)
HCO3 UR-SCNC: 30 MMOL/L (ref 24–28)
HCT VFR BLD AUTO: 42.9 % (ref 37–48.5)
HCT VFR BLD AUTO: 44.4 % (ref 37–48.5)
HCT VFR BLD AUTO: 46 % (ref 37–48.5)
HCT VFR BLD CALC: 49 %PCV (ref 36–54)
HGB BLD-MCNC: 14.3 G/DL (ref 12–16)
HGB BLD-MCNC: 14.8 G/DL (ref 12–16)
HGB BLD-MCNC: 14.8 G/DL (ref 12–16)
IMM GRANULOCYTES # BLD AUTO: 0.04 K/UL (ref 0–0.04)
IMM GRANULOCYTES # BLD AUTO: 0.18 K/UL (ref 0–0.04)
IMM GRANULOCYTES # BLD AUTO: 0.28 K/UL (ref 0–0.04)
IMM GRANULOCYTES NFR BLD AUTO: 0.4 % (ref 0–0.5)
IMM GRANULOCYTES NFR BLD AUTO: 0.8 % (ref 0–0.5)
IMM GRANULOCYTES NFR BLD AUTO: 1.2 % (ref 0–0.5)
INFLUENZA A, MOLECULAR: NEGATIVE
INFLUENZA B, MOLECULAR: NEGATIVE
IP: 12
IP: 16
LYMPHOCYTES # BLD AUTO: 1.1 K/UL (ref 1–4.8)
LYMPHOCYTES # BLD AUTO: 1.8 K/UL (ref 1–4.8)
LYMPHOCYTES # BLD AUTO: 2 K/UL (ref 1–4.8)
LYMPHOCYTES NFR BLD: 19.7 % (ref 18–48)
LYMPHOCYTES NFR BLD: 4.9 % (ref 18–48)
LYMPHOCYTES NFR BLD: 7.7 % (ref 18–48)
MAGNESIUM SERPL-MCNC: 1.6 MG/DL (ref 1.6–2.6)
MAGNESIUM SERPL-MCNC: 1.8 MG/DL (ref 1.6–2.6)
MCH RBC QN AUTO: 30.3 PG (ref 27–31)
MCH RBC QN AUTO: 31 PG (ref 27–31)
MCH RBC QN AUTO: 31 PG (ref 27–31)
MCHC RBC AUTO-ENTMCNC: 32.2 G/DL (ref 32–36)
MCHC RBC AUTO-ENTMCNC: 33.3 G/DL (ref 32–36)
MCHC RBC AUTO-ENTMCNC: 33.3 G/DL (ref 32–36)
MCV RBC AUTO: 93 FL (ref 82–98)
MCV RBC AUTO: 93 FL (ref 82–98)
MCV RBC AUTO: 94 FL (ref 82–98)
MIN VOL: 23.8
MODE: ABNORMAL
MODE: ABNORMAL
MONOCYTES # BLD AUTO: 0.6 K/UL (ref 0.3–1)
MONOCYTES # BLD AUTO: 0.9 K/UL (ref 0.3–1)
MONOCYTES # BLD AUTO: 1.1 K/UL (ref 0.3–1)
MONOCYTES NFR BLD: 2.7 % (ref 4–15)
MONOCYTES NFR BLD: 4.7 % (ref 4–15)
MONOCYTES NFR BLD: 8.5 % (ref 4–15)
NEUTROPHILS # BLD AUTO: 19.8 K/UL (ref 1.8–7.7)
NEUTROPHILS # BLD AUTO: 20.1 K/UL (ref 1.8–7.7)
NEUTROPHILS # BLD AUTO: 7.2 K/UL (ref 1.8–7.7)
NEUTROPHILS NFR BLD: 70.3 % (ref 38–73)
NEUTROPHILS NFR BLD: 86.3 % (ref 38–73)
NEUTROPHILS NFR BLD: 91.5 % (ref 38–73)
NRBC BLD-RTO: 0 /100 WBC
OB PNL STL: POSITIVE
OHS QRS DURATION: 70 MS
OHS QTC CALCULATION: 462 MS
PCO2 BLDA: 48.2 MMHG (ref 35–45)
PCO2 BLDA: 49 MMHG (ref 35–45)
PH SMN: 7.37 [PH] (ref 7.35–7.45)
PH SMN: 7.4 [PH] (ref 7.35–7.45)
PHOSPHATE SERPL-MCNC: 2.6 MG/DL (ref 2.7–4.5)
PHOSPHATE SERPL-MCNC: 3.3 MG/DL (ref 2.7–4.5)
PLATELET # BLD AUTO: 307 K/UL (ref 150–450)
PLATELET # BLD AUTO: 418 K/UL (ref 150–450)
PLATELET # BLD AUTO: 427 K/UL (ref 150–450)
PLATELET BLD QL SMEAR: ABNORMAL
PMV BLD AUTO: 10 FL (ref 9.2–12.9)
PMV BLD AUTO: 9.6 FL (ref 9.2–12.9)
PMV BLD AUTO: 9.9 FL (ref 9.2–12.9)
PO2 BLDA: 101 MMHG (ref 80–100)
PO2 BLDA: 108 MMHG (ref 80–100)
POC BE: 3 MMOL/L
POC BE: 5 MMOL/L
POC IONIZED CALCIUM: 1.18 MMOL/L (ref 1.06–1.42)
POC SATURATED O2: 98 % (ref 95–100)
POC SATURATED O2: 98 % (ref 95–100)
POC TCO2: 29 MMOL/L (ref 23–27)
POC TCO2: 31 MMOL/L (ref 23–27)
POTASSIUM BLD-SCNC: 3.8 MMOL/L (ref 3.5–5.1)
POTASSIUM SERPL-SCNC: 3.3 MMOL/L (ref 3.5–5.1)
POTASSIUM SERPL-SCNC: 3.6 MMOL/L (ref 3.5–5.1)
POTASSIUM SERPL-SCNC: 3.6 MMOL/L (ref 3.5–5.1)
PROT SERPL-MCNC: 7.2 G/DL (ref 6–8.4)
RBC # BLD AUTO: 4.61 M/UL (ref 4–5.4)
RBC # BLD AUTO: 4.78 M/UL (ref 4–5.4)
RBC # BLD AUTO: 4.89 M/UL (ref 4–5.4)
SAMPLE: ABNORMAL
SAMPLE: ABNORMAL
SARS-COV-2 RDRP RESP QL NAA+PROBE: NEGATIVE
SITE: ABNORMAL
SITE: ABNORMAL
SODIUM BLD-SCNC: 140 MMOL/L (ref 136–145)
SODIUM SERPL-SCNC: 139 MMOL/L (ref 136–145)
SODIUM SERPL-SCNC: 139 MMOL/L (ref 136–145)
SODIUM SERPL-SCNC: 141 MMOL/L (ref 136–145)
SP02: 95
SPECIMEN SOURCE: NORMAL
SPONT RATE: 24
SPONT RATE: 34
TROPONIN I SERPL HS-MCNC: 7.9 PG/ML (ref 0–14.9)
WBC # BLD AUTO: 10.28 K/UL (ref 3.9–12.7)
WBC # BLD AUTO: 22.01 K/UL (ref 3.9–12.7)
WBC # BLD AUTO: 22.97 K/UL (ref 3.9–12.7)

## 2025-01-01 PROCEDURE — 25000242 PHARM REV CODE 250 ALT 637 W/ HCPCS: Performed by: INTERNAL MEDICINE

## 2025-01-01 PROCEDURE — 85014 HEMATOCRIT: CPT

## 2025-01-01 PROCEDURE — 94640 AIRWAY INHALATION TREATMENT: CPT

## 2025-01-01 PROCEDURE — 82330 ASSAY OF CALCIUM: CPT

## 2025-01-01 PROCEDURE — 25000003 PHARM REV CODE 250: Performed by: INTERNAL MEDICINE

## 2025-01-01 PROCEDURE — 99285 EMERGENCY DEPT VISIT HI MDM: CPT | Mod: 25

## 2025-01-01 PROCEDURE — G0378 HOSPITAL OBSERVATION PER HR: HCPCS

## 2025-01-01 PROCEDURE — 99900031 HC PATIENT EDUCATION (STAT)

## 2025-01-01 PROCEDURE — 84100 ASSAY OF PHOSPHORUS: CPT | Performed by: INTERNAL MEDICINE

## 2025-01-01 PROCEDURE — 84484 ASSAY OF TROPONIN QUANT: CPT | Performed by: EMERGENCY MEDICINE

## 2025-01-01 PROCEDURE — 94761 N-INVAS EAR/PLS OXIMETRY MLT: CPT

## 2025-01-01 PROCEDURE — 5A09357 ASSISTANCE WITH RESPIRATORY VENTILATION, LESS THAN 24 CONSECUTIVE HOURS, CONTINUOUS POSITIVE AIRWAY PRESSURE: ICD-10-PCS | Performed by: INTERNAL MEDICINE

## 2025-01-01 PROCEDURE — 25000242 PHARM REV CODE 250 ALT 637 W/ HCPCS: Performed by: EMERGENCY MEDICINE

## 2025-01-01 PROCEDURE — 99900035 HC TECH TIME PER 15 MIN (STAT)

## 2025-01-01 PROCEDURE — 25000003 PHARM REV CODE 250: Performed by: STUDENT IN AN ORGANIZED HEALTH CARE EDUCATION/TRAINING PROGRAM

## 2025-01-01 PROCEDURE — 80048 BASIC METABOLIC PNL TOTAL CA: CPT | Performed by: INTERNAL MEDICINE

## 2025-01-01 PROCEDURE — 12000002 HC ACUTE/MED SURGE SEMI-PRIVATE ROOM

## 2025-01-01 PROCEDURE — 87502 INFLUENZA DNA AMP PROBE: CPT | Performed by: EMERGENCY MEDICINE

## 2025-01-01 PROCEDURE — 63600175 PHARM REV CODE 636 W HCPCS: Mod: JZ,TB | Performed by: INTERNAL MEDICINE

## 2025-01-01 PROCEDURE — 86803 HEPATITIS C AB TEST: CPT | Performed by: EMERGENCY MEDICINE

## 2025-01-01 PROCEDURE — 27000221 HC OXYGEN, UP TO 24 HOURS

## 2025-01-01 PROCEDURE — 87635 SARS-COV-2 COVID-19 AMP PRB: CPT | Performed by: EMERGENCY MEDICINE

## 2025-01-01 PROCEDURE — 27100171 HC OXYGEN HIGH FLOW UP TO 24 HOURS

## 2025-01-01 PROCEDURE — 94799 UNLISTED PULMONARY SVC/PX: CPT

## 2025-01-01 PROCEDURE — 96375 TX/PRO/DX INJ NEW DRUG ADDON: CPT

## 2025-01-01 PROCEDURE — 36600 WITHDRAWAL OF ARTERIAL BLOOD: CPT

## 2025-01-01 PROCEDURE — 84295 ASSAY OF SERUM SODIUM: CPT

## 2025-01-01 PROCEDURE — 96374 THER/PROPH/DIAG INJ IV PUSH: CPT

## 2025-01-01 PROCEDURE — 83735 ASSAY OF MAGNESIUM: CPT | Performed by: INTERNAL MEDICINE

## 2025-01-01 PROCEDURE — 63600175 PHARM REV CODE 636 W HCPCS: Performed by: INTERNAL MEDICINE

## 2025-01-01 PROCEDURE — 94640 AIRWAY INHALATION TREATMENT: CPT | Mod: XB

## 2025-01-01 PROCEDURE — 94660 CPAP INITIATION&MGMT: CPT

## 2025-01-01 PROCEDURE — 63600175 PHARM REV CODE 636 W HCPCS: Performed by: STUDENT IN AN ORGANIZED HEALTH CARE EDUCATION/TRAINING PROGRAM

## 2025-01-01 PROCEDURE — 21000000 HC CCU ICU ROOM CHARGE

## 2025-01-01 PROCEDURE — 36415 COLL VENOUS BLD VENIPUNCTURE: CPT | Performed by: INTERNAL MEDICINE

## 2025-01-01 PROCEDURE — 63700000 PHARM REV CODE 250 ALT 637 W/O HCPCS: Performed by: INTERNAL MEDICINE

## 2025-01-01 PROCEDURE — 82272 OCCULT BLD FECES 1-3 TESTS: CPT | Performed by: FAMILY MEDICINE

## 2025-01-01 PROCEDURE — 80053 COMPREHEN METABOLIC PANEL: CPT | Performed by: EMERGENCY MEDICINE

## 2025-01-01 PROCEDURE — 87389 HIV-1 AG W/HIV-1&-2 AB AG IA: CPT | Performed by: EMERGENCY MEDICINE

## 2025-01-01 PROCEDURE — 25000242 PHARM REV CODE 250 ALT 637 W/ HCPCS: Performed by: STUDENT IN AN ORGANIZED HEALTH CARE EDUCATION/TRAINING PROGRAM

## 2025-01-01 PROCEDURE — 63600175 PHARM REV CODE 636 W HCPCS: Mod: JZ,TB | Performed by: EMERGENCY MEDICINE

## 2025-01-01 PROCEDURE — 85025 COMPLETE CBC W/AUTO DIFF WBC: CPT | Performed by: EMERGENCY MEDICINE

## 2025-01-01 PROCEDURE — 85025 COMPLETE CBC W/AUTO DIFF WBC: CPT | Performed by: INTERNAL MEDICINE

## 2025-01-01 PROCEDURE — 94760 N-INVAS EAR/PLS OXIMETRY 1: CPT

## 2025-01-01 PROCEDURE — 93005 ELECTROCARDIOGRAM TRACING: CPT | Performed by: INTERNAL MEDICINE

## 2025-01-01 PROCEDURE — 84132 ASSAY OF SERUM POTASSIUM: CPT

## 2025-01-01 PROCEDURE — 82803 BLOOD GASES ANY COMBINATION: CPT

## 2025-01-01 RX ORDER — FAMOTIDINE 20 MG/1
20 TABLET, FILM COATED ORAL 2 TIMES DAILY
Status: DISCONTINUED | OUTPATIENT
Start: 2025-01-01 | End: 2025-01-01

## 2025-01-01 RX ORDER — IPRATROPIUM BROMIDE 0.5 MG/2.5ML
0.5 SOLUTION RESPIRATORY (INHALATION) EVERY 4 HOURS
Status: DISCONTINUED | OUTPATIENT
Start: 2025-01-01 | End: 2025-01-01

## 2025-01-01 RX ORDER — MORPHINE SULFATE 1 MG/ML
0-10 INJECTION, SOLUTION INTRAVENOUS CONTINUOUS
Status: DISCONTINUED | OUTPATIENT
Start: 2025-01-01 | End: 2025-01-01 | Stop reason: HOSPADM

## 2025-01-01 RX ORDER — SODIUM,POTASSIUM PHOSPHATES 280-250MG
2 POWDER IN PACKET (EA) ORAL
Status: DISCONTINUED | OUTPATIENT
Start: 2025-01-01 | End: 2025-01-01

## 2025-01-01 RX ORDER — BUDESONIDE 0.5 MG/2ML
0.5 INHALANT ORAL EVERY 12 HOURS
Status: DISCONTINUED | OUTPATIENT
Start: 2025-01-01 | End: 2025-01-01

## 2025-01-01 RX ORDER — ALBUTEROL SULFATE 0.83 MG/ML
10 SOLUTION RESPIRATORY (INHALATION)
Status: COMPLETED | OUTPATIENT
Start: 2025-01-01 | End: 2025-01-01

## 2025-01-01 RX ORDER — LEVOTHYROXINE SODIUM 100 UG/1
1 TABLET ORAL DAILY
COMMUNITY

## 2025-01-01 RX ORDER — LEVALBUTEROL INHALATION SOLUTION 1.25 MG/3ML
1.25 SOLUTION RESPIRATORY (INHALATION) EVERY 8 HOURS
Status: DISCONTINUED | OUTPATIENT
Start: 2025-01-01 | End: 2025-01-01

## 2025-01-01 RX ORDER — LEVALBUTEROL INHALATION SOLUTION 1.25 MG/3ML
1.25 SOLUTION RESPIRATORY (INHALATION) EVERY 6 HOURS
Status: DISCONTINUED | OUTPATIENT
Start: 2025-01-01 | End: 2025-01-01

## 2025-01-01 RX ORDER — ALPRAZOLAM 0.5 MG/1
0.5 TABLET ORAL 3 TIMES DAILY PRN
Status: DISCONTINUED | OUTPATIENT
Start: 2025-01-01 | End: 2025-01-01 | Stop reason: HOSPADM

## 2025-01-01 RX ORDER — MORPHINE SULFATE 1 MG/ML
0-10 INJECTION, SOLUTION INTRAVENOUS CONTINUOUS
Status: CANCELLED | OUTPATIENT
Start: 2025-01-01

## 2025-01-01 RX ORDER — AZITHROMYCIN 250 MG/1
500 TABLET, FILM COATED ORAL DAILY
Status: DISCONTINUED | OUTPATIENT
Start: 2025-01-01 | End: 2025-01-01

## 2025-01-01 RX ORDER — IPRATROPIUM BROMIDE 0.5 MG/2.5ML
0.5 SOLUTION RESPIRATORY (INHALATION) EVERY 6 HOURS
Status: DISCONTINUED | OUTPATIENT
Start: 2025-01-01 | End: 2025-01-01

## 2025-01-01 RX ORDER — LABETALOL HYDROCHLORIDE 5 MG/ML
10 INJECTION, SOLUTION INTRAVENOUS EVERY 4 HOURS PRN
Status: DISCONTINUED | OUTPATIENT
Start: 2025-01-01 | End: 2025-01-01

## 2025-01-01 RX ORDER — METHYLPREDNISOLONE SOD SUCC 125 MG
125 VIAL (EA) INJECTION
Status: COMPLETED | OUTPATIENT
Start: 2025-01-01 | End: 2025-01-01

## 2025-01-01 RX ORDER — IPRATROPIUM BROMIDE 0.5 MG/2.5ML
0.5 SOLUTION RESPIRATORY (INHALATION) EVERY 6 HOURS
Status: CANCELLED | OUTPATIENT
Start: 2025-01-01

## 2025-01-01 RX ORDER — SODIUM CHLORIDE 0.9 % (FLUSH) 0.9 %
3 SYRINGE (ML) INJECTION
Status: DISCONTINUED | OUTPATIENT
Start: 2025-01-01 | End: 2025-01-01

## 2025-01-01 RX ORDER — LANOLIN ALCOHOL/MO/W.PET/CERES
800 CREAM (GRAM) TOPICAL
Status: DISCONTINUED | OUTPATIENT
Start: 2025-01-01 | End: 2025-01-01

## 2025-01-01 RX ORDER — LORAZEPAM 2 MG/ML
2 INJECTION INTRAMUSCULAR
Status: CANCELLED | OUTPATIENT
Start: 2025-01-01

## 2025-01-01 RX ORDER — IPRATROPIUM BROMIDE 0.5 MG/2.5ML
0.5 SOLUTION RESPIRATORY (INHALATION) EVERY 6 HOURS
Status: DISCONTINUED | OUTPATIENT
Start: 2025-01-01 | End: 2025-01-01 | Stop reason: HOSPADM

## 2025-01-01 RX ORDER — IPRATROPIUM BROMIDE 0.5 MG/2.5ML
0.5 SOLUTION RESPIRATORY (INHALATION)
Status: COMPLETED | OUTPATIENT
Start: 2025-01-01 | End: 2025-01-01

## 2025-01-01 RX ORDER — IPRATROPIUM BROMIDE AND ALBUTEROL SULFATE 2.5; .5 MG/3ML; MG/3ML
3 SOLUTION RESPIRATORY (INHALATION) EVERY 4 HOURS
Status: DISCONTINUED | OUTPATIENT
Start: 2025-01-01 | End: 2025-01-01

## 2025-01-01 RX ORDER — AMLODIPINE BESYLATE 5 MG/1
10 TABLET ORAL DAILY
Status: DISCONTINUED | OUTPATIENT
Start: 2025-01-01 | End: 2025-01-01

## 2025-01-01 RX ORDER — ARFORMOTEROL TARTRATE 15 UG/2ML
15 SOLUTION RESPIRATORY (INHALATION) 2 TIMES DAILY
Status: DISCONTINUED | OUTPATIENT
Start: 2025-01-01 | End: 2025-01-01

## 2025-01-01 RX ORDER — LEVALBUTEROL INHALATION SOLUTION 1.25 MG/3ML
1.25 SOLUTION RESPIRATORY (INHALATION) EVERY 6 HOURS
Status: DISCONTINUED | OUTPATIENT
Start: 2025-01-01 | End: 2025-01-01 | Stop reason: HOSPADM

## 2025-01-01 RX ORDER — IPRATROPIUM BROMIDE AND ALBUTEROL SULFATE 2.5; .5 MG/3ML; MG/3ML
3 SOLUTION RESPIRATORY (INHALATION)
Status: DISCONTINUED | OUTPATIENT
Start: 2025-01-01 | End: 2025-01-01

## 2025-01-01 RX ORDER — ALPRAZOLAM 0.5 MG/1
0.5 TABLET ORAL 3 TIMES DAILY PRN
Status: CANCELLED | OUTPATIENT
Start: 2025-01-01

## 2025-01-01 RX ORDER — IPRATROPIUM BROMIDE 0.5 MG/2.5ML
0.5 SOLUTION RESPIRATORY (INHALATION) EVERY 6 HOURS PRN
Status: DISCONTINUED | OUTPATIENT
Start: 2025-01-01 | End: 2025-01-01 | Stop reason: HOSPADM

## 2025-01-01 RX ORDER — LEVALBUTEROL INHALATION SOLUTION 1.25 MG/3ML
1.25 SOLUTION RESPIRATORY (INHALATION) EVERY 6 HOURS PRN
Status: DISCONTINUED | OUTPATIENT
Start: 2025-01-01 | End: 2025-01-01 | Stop reason: HOSPADM

## 2025-01-01 RX ORDER — LORAZEPAM 2 MG/ML
2 INJECTION INTRAMUSCULAR
Status: DISCONTINUED | OUTPATIENT
Start: 2025-01-01 | End: 2025-01-01 | Stop reason: HOSPADM

## 2025-01-01 RX ORDER — LEVALBUTEROL INHALATION SOLUTION 1.25 MG/3ML
1.25 SOLUTION RESPIRATORY (INHALATION) EVERY 6 HOURS
Status: CANCELLED | OUTPATIENT
Start: 2025-01-01

## 2025-01-01 RX ORDER — SCOPOLAMINE 1 MG/3D
1 PATCH, EXTENDED RELEASE TRANSDERMAL
Status: DISCONTINUED | OUTPATIENT
Start: 2025-01-01 | End: 2025-01-01 | Stop reason: HOSPADM

## 2025-01-01 RX ORDER — HYDRALAZINE HYDROCHLORIDE 20 MG/ML
10 INJECTION INTRAMUSCULAR; INTRAVENOUS EVERY 4 HOURS PRN
Status: DISCONTINUED | OUTPATIENT
Start: 2025-01-01 | End: 2025-01-01

## 2025-01-01 RX ADMIN — AZITHROMYCIN DIHYDRATE 500 MG: 250 TABLET, FILM COATED ORAL at 08:01

## 2025-01-01 RX ADMIN — LORAZEPAM 2 MG: 2 INJECTION INTRAMUSCULAR; INTRAVENOUS at 06:02

## 2025-01-01 RX ADMIN — Medication 8 MG/HR: at 11:02

## 2025-01-01 RX ADMIN — ALBUTEROL SULFATE 10 MG: 2.5 SOLUTION RESPIRATORY (INHALATION) at 09:01

## 2025-01-01 RX ADMIN — IPRATROPIUM BROMIDE 0.5 MG: 0.5 SOLUTION RESPIRATORY (INHALATION) at 11:01

## 2025-01-01 RX ADMIN — AZITHROMYCIN DIHYDRATE 500 MG: 250 TABLET, FILM COATED ORAL at 10:01

## 2025-01-01 RX ADMIN — HYDRALAZINE HYDROCHLORIDE 10 MG: 20 INJECTION, SOLUTION INTRAMUSCULAR; INTRAVENOUS at 02:01

## 2025-01-01 RX ADMIN — METHYLPREDNISOLONE SODIUM SUCCINATE 60 MG: 40 INJECTION, POWDER, FOR SOLUTION INTRAMUSCULAR; INTRAVENOUS at 06:01

## 2025-01-01 RX ADMIN — IPRATROPIUM BROMIDE AND ALBUTEROL SULFATE 3 ML: .5; 3 SOLUTION RESPIRATORY (INHALATION) at 04:01

## 2025-01-01 RX ADMIN — POTASSIUM BICARBONATE 35 MEQ: 391 TABLET, EFFERVESCENT ORAL at 03:01

## 2025-01-01 RX ADMIN — METHYLPREDNISOLONE SODIUM SUCCINATE 60 MG: 40 INJECTION, POWDER, FOR SOLUTION INTRAMUSCULAR; INTRAVENOUS at 02:01

## 2025-01-01 RX ADMIN — METHYLPREDNISOLONE SODIUM SUCCINATE 60 MG: 40 INJECTION, POWDER, FOR SOLUTION INTRAMUSCULAR; INTRAVENOUS at 05:01

## 2025-01-01 RX ADMIN — BUDESONIDE INHALATION 0.5 MG: 0.5 SUSPENSION RESPIRATORY (INHALATION) at 08:01

## 2025-01-01 RX ADMIN — APIXABAN 5 MG: 5 TABLET, FILM COATED ORAL at 10:01

## 2025-01-01 RX ADMIN — SCOPOLAMINE 1 PATCH: 1.5 PATCH, EXTENDED RELEASE TRANSDERMAL at 04:02

## 2025-01-01 RX ADMIN — BUDESONIDE INHALATION 0.5 MG: 0.5 SUSPENSION RESPIRATORY (INHALATION) at 06:02

## 2025-01-01 RX ADMIN — APIXABAN 5 MG: 5 TABLET, FILM COATED ORAL at 08:01

## 2025-01-01 RX ADMIN — ARFORMOTEROL TARTRATE 15 MCG: 15 SOLUTION RESPIRATORY (INHALATION) at 08:01

## 2025-01-01 RX ADMIN — ARFORMOTEROL TARTRATE 15 MCG: 15 SOLUTION RESPIRATORY (INHALATION) at 06:02

## 2025-01-01 RX ADMIN — HYDRALAZINE HYDROCHLORIDE 10 MG: 20 INJECTION, SOLUTION INTRAMUSCULAR; INTRAVENOUS at 08:01

## 2025-01-01 RX ADMIN — ALPRAZOLAM 0.5 MG: 0.5 TABLET ORAL at 03:02

## 2025-01-01 RX ADMIN — FAMOTIDINE 20 MG: 20 TABLET ORAL at 10:01

## 2025-01-01 RX ADMIN — METHYLPREDNISOLONE SODIUM SUCCINATE 60 MG: 40 INJECTION, POWDER, FOR SOLUTION INTRAMUSCULAR; INTRAVENOUS at 11:01

## 2025-01-01 RX ADMIN — HYDRALAZINE HYDROCHLORIDE 10 MG: 20 INJECTION, SOLUTION INTRAMUSCULAR; INTRAVENOUS at 02:02

## 2025-01-01 RX ADMIN — METHYLPREDNISOLONE SODIUM SUCCINATE 60 MG: 40 INJECTION, POWDER, FOR SOLUTION INTRAMUSCULAR; INTRAVENOUS at 08:01

## 2025-01-01 RX ADMIN — LEVALBUTEROL HYDROCHLORIDE 1.25 MG: 1.25 SOLUTION RESPIRATORY (INHALATION) at 12:02

## 2025-01-01 RX ADMIN — Medication 8 MG/HR: at 09:02

## 2025-01-01 RX ADMIN — IPRATROPIUM BROMIDE 0.5 MG: 0.5 SOLUTION RESPIRATORY (INHALATION) at 06:01

## 2025-01-01 RX ADMIN — IPRATROPIUM BROMIDE 0.5 MG: 0.5 SOLUTION RESPIRATORY (INHALATION) at 07:01

## 2025-01-01 RX ADMIN — METHYLPREDNISOLONE SODIUM SUCCINATE 125 MG: 125 INJECTION, POWDER, FOR SOLUTION INTRAMUSCULAR; INTRAVENOUS at 10:01

## 2025-01-01 RX ADMIN — IPRATROPIUM BROMIDE 0.5 MG: 0.5 SOLUTION RESPIRATORY (INHALATION) at 08:01

## 2025-01-01 RX ADMIN — LORAZEPAM 2 MG: 2 INJECTION INTRAMUSCULAR; INTRAVENOUS at 11:02

## 2025-01-01 RX ADMIN — IPRATROPIUM BROMIDE 0.5 MG: 0.5 SOLUTION RESPIRATORY (INHALATION) at 12:01

## 2025-01-01 RX ADMIN — IPRATROPIUM BROMIDE 0.5 MG: 0.5 SOLUTION RESPIRATORY (INHALATION) at 03:01

## 2025-01-01 RX ADMIN — IPRATROPIUM BROMIDE 0.5 MG: 0.5 SOLUTION RESPIRATORY (INHALATION) at 12:02

## 2025-01-01 RX ADMIN — LABETALOL HYDROCHLORIDE 10 MG: 5 INJECTION, SOLUTION INTRAVENOUS at 10:01

## 2025-01-01 RX ADMIN — LEVALBUTEROL HYDROCHLORIDE 1.25 MG: 1.25 SOLUTION RESPIRATORY (INHALATION) at 06:02

## 2025-01-01 RX ADMIN — METHYLPREDNISOLONE SODIUM SUCCINATE 60 MG: 40 INJECTION, POWDER, FOR SOLUTION INTRAMUSCULAR; INTRAVENOUS at 02:02

## 2025-01-01 RX ADMIN — FAMOTIDINE 20 MG: 20 TABLET ORAL at 08:01

## 2025-01-01 RX ADMIN — AMLODIPINE BESYLATE 10 MG: 5 TABLET ORAL at 10:01

## 2025-01-01 RX ADMIN — ALPRAZOLAM 0.5 MG: 0.5 TABLET ORAL at 06:01

## 2025-01-01 RX ADMIN — IPRATROPIUM BROMIDE 0.5 MG: 0.5 SOLUTION RESPIRATORY (INHALATION) at 09:01

## 2025-01-01 RX ADMIN — LORAZEPAM 2 MG: 2 INJECTION INTRAMUSCULAR; INTRAVENOUS at 02:02

## 2025-01-01 RX ADMIN — POTASSIUM BICARBONATE 50 MEQ: 978 TABLET, EFFERVESCENT ORAL at 06:02

## 2025-01-01 RX ADMIN — HYDRALAZINE HYDROCHLORIDE 10 MG: 20 INJECTION, SOLUTION INTRAMUSCULAR; INTRAVENOUS at 04:01

## 2025-01-01 RX ADMIN — HYDRALAZINE HYDROCHLORIDE 10 MG: 20 INJECTION, SOLUTION INTRAMUSCULAR; INTRAVENOUS at 06:01

## 2025-01-01 RX ADMIN — Medication 4 MG/HR: at 09:02

## 2025-01-01 RX ADMIN — IPRATROPIUM BROMIDE 0.5 MG: 0.5 SOLUTION RESPIRATORY (INHALATION) at 06:02

## 2025-01-01 RX ADMIN — IPRATROPIUM BROMIDE 0.5 MG: 0.5 SOLUTION RESPIRATORY (INHALATION) at 07:02

## 2025-01-01 RX ADMIN — LEVALBUTEROL HYDROCHLORIDE 1.25 MG: 1.25 SOLUTION RESPIRATORY (INHALATION) at 12:01

## 2025-01-01 RX ADMIN — LEVALBUTEROL HYDROCHLORIDE 1.25 MG: 1.25 SOLUTION RESPIRATORY (INHALATION) at 06:01

## 2025-01-01 RX ADMIN — ALPRAZOLAM 0.5 MG: 0.5 TABLET ORAL at 04:01

## 2025-01-01 RX ADMIN — LEVALBUTEROL HYDROCHLORIDE 1.25 MG: 1.25 SOLUTION RESPIRATORY (INHALATION) at 07:02

## 2025-01-02 DIAGNOSIS — J44.9 CHRONIC OBSTRUCTIVE PULMONARY DISEASE, UNSPECIFIED COPD TYPE: Primary | ICD-10-CM

## 2025-01-02 RX ORDER — FLUTICASONE FUROATE, UMECLIDINIUM BROMIDE AND VILANTEROL TRIFENATATE 100; 62.5; 25 UG/1; UG/1; UG/1
1 POWDER RESPIRATORY (INHALATION) DAILY
Qty: 60 EACH | Refills: 11 | Status: SHIPPED | OUTPATIENT
Start: 2025-01-02

## 2025-01-30 PROBLEM — E66.9 OBESITY: Status: ACTIVE | Noted: 2025-01-01

## 2025-01-30 PROBLEM — J44.1 COPD EXACERBATION: Status: ACTIVE | Noted: 2025-01-01

## 2025-01-30 NOTE — ED PROVIDER NOTES
Chief complaint:  Shortness of Breath (X1 week, unable to tolerate taking a shower this am. Wears 3L O2 at home), Weakness, and Cough      HPI:  Clarita Gómez is a 69 y.o. female with hx splenic artery thrombus with infarct on apixaban, COPD on 3L NC, anemia presenting with one-week history of persistent shortness of breath above baseline, more marked on exertion, leading to difficulty taking a shower.  She has endorsed a new cough productive of yellowish, nonbloody sputum during this time.  Earlier in the illness, she did have some fever up to 102 last week that has not been present for the past four days.  She denies new chest pain.  There was no pleuritic pain or hemoptysis.  No new leg swelling.  No change in urination.  No blood in the stools or dark stools.  No associated emesis.  She is using breathing treatments q.4 hours and taking other medications as prescribed.  She denies recent steroids.  She did have multiple sick contacts in her family with influenza over the last 1-2 weeks.    ROS: As per HPI and below:  No confusion, rash, swelling, headache.  No focal numbness or weakness.  No loss of consciousness.    Review of patient's allergies indicates:   Allergen Reactions    Zosyn [piperacillin-tazobactam] Swelling     Swelling to lips       Patient's Medications   New Prescriptions    No medications on file   Previous Medications    ALBUTEROL (VENTOLIN HFA) 90 MCG/ACTUATION INHALER    Inhale 2 puffs into the lungs every 6 (six) hours as needed for Wheezing. Rescue    CYANOCOBALAMIN 1,000 MCG/ML INJECTION    Inject 1 ml B 12 subq q 2 weeks.    ELIQUIS 5 MG TAB    TAKE ONE TABLET BY MOUTH TWO TIMES A DAY    FLUTICASONE-UMECLIDIN-VILANTER (TRELEGY ELLIPTA) 100-62.5-25 MCG DSDV    Inhale 1 puff into the lungs once daily.    LEVOTHYROXINE (SYNTHROID) 100 MCG TABLET    Take 1 tablet by mouth once daily.    PRAVASTATIN (PRAVACHOL) 40 MG TABLET    Take 40 mg by mouth once daily.    PRENATAL 105-IRON-FOLIC AC-DHA  "30 MG IRON- 1.4 MG-300 MG CMPK    Take 1 tablet by mouth once daily.    SYRINGE WITH NEEDLE (TUBERCULIN SYRINGE) 1 ML 27 X 1/2" SYRG    Use for B 12 subq monthly injections.   Modified Medications    No medications on file   Discontinued Medications    ACETAMINOPHEN (TYLENOL) 500 MG TABLET    Take 500 mg by mouth every 6 (six) hours as needed for Pain.    ALBUTEROL (PROVENTIL) 2.5 MG /3 ML (0.083 %) NEBULIZER SOLUTION    Take 2.5 mg by nebulization every 6 (six) hours as needed for Wheezing. Rescue    ALBUTEROL (PROVENTIL/VENTOLIN HFA) 90 MCG/ACTUATION INHALER    Inhale 2 puffs into the lungs every 4 (four) hours as needed for Wheezing. Rescue    ALBUTEROL (VENTOLIN HFA) 90 MCG/ACTUATION INHALER    Inhale 2 puffs into the lungs every 6 (six) hours as needed for Wheezing. Rescue    ALBUTEROL (VENTOLIN HFA) 90 MCG/ACTUATION INHALER    Inhale 2 puffs into the lungs every 6 (six) hours as needed for Wheezing. Rescue    ALBUTEROL (VENTOLIN HFA) 90 MCG/ACTUATION INHALER    Inhale 2 puffs into the lungs every 6 (six) hours as needed for Wheezing. Rescue    ALBUTEROL INHL    Inhale into the lungs.    DICLOFENAC SODIUM (VOLTAREN) 1 % GEL        ENOXAPARIN (LOVENOX) 40 MG/0.4 ML SYRG    Inject into the skin.    LEVOTHYROXINE (SYNTHROID) 112 MCG TABLET    levothyroxine 112 mcg tablet   Take 1 tablet every day by oral route.    ONDANSETRON (ZOFRAN-ODT) 4 MG TBDL    Take 4 mg by mouth.    SYRINGE WITH NEEDLE, SAFETY 3 ML 25 GAUGE X 5/8" SYRG    1 Syringe by Misc.(Non-Drug; Combo Route) route every 30 days.    TRELEGY ELLIPTA 100-62.5-25 MCG DSDV    INHALE 1 PUFF INTO THE LUNGS ONCE DAILY       PMH:  As per HPI and below:  Past Medical History:   Diagnosis Date    Anticardiolipin antibody positive     COPD (chronic obstructive pulmonary disease)     Hyperlipidemia     Hypothyroid     Personal history of tobacco use, presenting hazards to health     Splenic infarct      Past Surgical History:   Procedure Laterality Date    " CHOLECYSTECTOMY      COLECTOMY      partial due to diverticulosis    COLONOSCOPY N/A 12/28/2023    Procedure: COLONOSCOPY;  Surgeon: Magdi Adames MD;  Location: [facility];  Service: Endoscopy;  Laterality: N/A;    HYSTERECTOMY      LAPAROSCOPIC REPAIR OF VENTRAL HERNIA      SMALL BOWEL ENTEROSCOPY N/A 12/28/2023    Procedure: PUSH ENTEROSCOPY;  Surgeon: Magdi Adames MD;  Location: [facility] ENDO;  Service: Endoscopy;  Laterality: N/A;       Social History     Socioeconomic History    Marital status:    Tobacco Use    Smoking status: Every Day     Current packs/day: 0.50     Average packs/day: 0.5 packs/day for 40.0 years (20.0 ttl pk-yrs)     Types: Cigarettes    Smokeless tobacco: Never    Tobacco comments:     currently smoking 1/2 PPD     Smokes 10 cigarettes daily   Substance and Sexual Activity    Alcohol use: No    Drug use: No       Family History   Problem Relation Name Age of Onset    Breast cancer Maternal Aunt      Heart disease Father      Ovarian cancer Sister      Lung cancer Brother         Physical Exam:    Vitals:    01/30/25 1700   BP: (!) 170/75   Pulse: (!) 120   Resp: (!) 30   Temp:      GENERAL:  No apparent distress.  Alert.    HEENT:  Moist mucous membranes.  Normocephalic and atraumatic.    NECK:  No swelling.  Midline trachea.   CARDIOVASCULAR:  Regular rate and rhythm.  2+ radial pulses.  No murmur.  PULMONARY:  Distant breath sounds with a occasional scattered rhonchi.  Bilateral expiratory wheezes are present.  There is mild tachypnea without accessory muscle use.  Patient is able to speak in complete sentences.  ABDOMEN:  Non-tender and non-distended.    EXTREMITIES:  Warm and well perfused.  Brisk capillary refill.  Minimal peripheral edema.  Legs are symmetric and nontender.  NEUROLOGICAL:  Normal mental status.  Appropriate and conversant.    SKIN:  No rashes or ecchymoses.      Labs Reviewed   COMPREHENSIVE METABOLIC PANEL - Abnormal       Result Value    Sodium 141       Potassium 3.3 (*)     Chloride 103      CO2 32 (*)     Glucose 106      BUN 8      Creatinine 0.7      Calcium 9.1      Total Protein 7.2      Albumin 3.8      Total Bilirubin 0.3      Alkaline Phosphatase 43 (*)     AST 20      ALT 18      eGFR >60.0      Anion Gap 6 (*)     Narrative:     Release to patient->Immediate   ISTAT PROCEDURE - Abnormal    POC PH 7.365      POC PCO2 49.0 (*)     POC PO2 108 (*)     POC HCO3 28.0      POC BE 3 (*)     POC SATURATED O2 98      POC Glucose 192 (*)     POC Sodium 140      POC Potassium 3.8      POC TCO2 29 (*)     POC Ionized Calcium 1.18      POC Hematocrit 49      Rate 18      Sample ARTERIAL      Site LR      Allens Test Pass      DelSys CPAP/BiPAP      Mode BiPAP      FiO2 40      Spont Rate 34      Min Vol 23.8      Sp02 95      IP 16      EP 8     CBC W/ AUTO DIFFERENTIAL    WBC 10.28      RBC 4.61      Hemoglobin 14.3      Hematocrit 42.9      MCV 93      MCH 31.0      MCHC 33.3      RDW 12.3      Platelets 307      MPV 9.9      Immature Granulocytes 0.4      Gran # (ANC) 7.2      Immature Grans (Abs) 0.04      Lymph # 2.0      Mono # 0.9      Eos # 0.1      Baso # 0.05      nRBC 0      Gran % 70.3      Lymph % 19.7      Mono % 8.5      Eosinophil % 0.6      Basophil % 0.5      Differential Method Automated      Narrative:     Release to patient->Immediate   TROPONIN I HIGH SENSITIVITY    Troponin I High Sensitivity 7.9      Narrative:     Release to patient->Immediate   INFLUENZA A AND B ANTIGEN    Influenza A, Molecular Negative      Influenza B, Molecular Negative      Flu A & B Source Nasal swab      Narrative:     Specimen Source->Nasopharyngeal Swab   SARS-COV-2 RNA AMPLIFICATION, QUAL    SARS-CoV-2 RNA, Amplification, Qual Negative     HEPATITIS C ANTIBODY   HIV 1 / 2 ANTIBODY       Current Discharge Medication List        CONTINUE these medications which have NOT CHANGED    Details   albuterol (VENTOLIN HFA) 90 mcg/actuation inhaler Inhale 2 puffs into the  "lungs every 6 (six) hours as needed for Wheezing. Rescue  Qty: 18 g, Refills: 11    Associated Diagnoses: Chronic obstructive pulmonary disease, unspecified COPD type      cyanocobalamin 1,000 mcg/mL injection Inject 1 ml B 12 subq q 2 weeks.  Qty: 6 mL, Refills: 4    Associated Diagnoses: Dietary vitamin B12 deficiency anemia      ELIQUIS 5 mg Tab TAKE ONE TABLET BY MOUTH TWO TIMES A DAY  Qty: 60 tablet, Refills: 7    Associated Diagnoses: Anti-cardiolipin antibody positive      fluticasone-umeclidin-vilanter (TRELEGY ELLIPTA) 100-62.5-25 mcg DsDv Inhale 1 puff into the lungs once daily.  Qty: 60 each, Refills: 11    Associated Diagnoses: Chronic obstructive pulmonary disease, unspecified COPD type      levothyroxine (SYNTHROID) 100 MCG tablet Take 1 tablet by mouth once daily.      pravastatin (PRAVACHOL) 40 MG tablet Take 40 mg by mouth once daily.      prenatal 105-iron-folic ac-dha 30 mg iron- 1.4 mg-300 mg Cmpk Take 1 tablet by mouth once daily.      syringe with needle (TUBERCULIN SYRINGE) 1 mL 27 x 1/2" Syrg Use for B 12 subq monthly injections.  Qty: 6 each, Refills: 4    Associated Diagnoses: B12 deficiency             Orders Placed This Encounter   Procedures    X-Ray Chest 1 View    Hepatitis C Antibody    HIV 1/2 Ag/Ab (4th Gen)    CBC auto differential    Comprehensive metabolic panel    Troponin I High Sensitivity    Influenza antigen Nasopharyngeal Swab    COVID-19 Rapid Screening    Basic metabolic panel    Magnesium    Phosphorus    CBC auto differential    Diet diabetic 2000 Calories (up to 75 gm per meal)    If patient has altered sensorium, order POCT Arterial Blood Gas and notify MD    Tobacco cessation education    Notify Physician    Place sequential compression device    Vital signs q4h (Floor vitals)    Full code    Inhalation Treatment Once    Oxygen Continuous    POCT ARTERIAL BLOOD GAS Blood Gas    Bipap Continuous    Pulse Oximetry Continuous    EKG 12-lead    Insert Saline lock IV    " Insert peripheral IV    Place in Observation       Imaging Results              X-Ray Chest 1 View (Final result)  Result time 01/30/25 10:26:04      Final result by Sergey Dover MD (01/30/25 10:26:04)                   Impression:      No acute cardiac or pulmonary process.      Electronically signed by: Sergey Dover  Date:    01/30/2025  Time:    10:26               Narrative:    CLINICAL HISTORY:  ([id number])70 y/o  (1955) F    SOB;    TECHNIQUE:  (A#[id number], exam time 1/30/2025 10:23)    XR CHEST 1 VIEW [id number]    COMPARISON:  Radiograph from 09/10/2018.    FINDINGS:  The lungs are clear. Minimal scattered reticular interstitial lung markings are seen at the lung bases, similar to the previous exam.  The right costophrenic sulcus is below the field of view secondary to technique.  No pneumothorax is identified. The heart is normal in size. Atheromatous calcifications are seen at the aortic arch. Osseous structures show degenerative changes in the spine. The visualized upper abdomen is unremarkable.                                  (radiology reading, visualized by me)      ED Course as of 01/30/25 1735   Thu Jan 30, 2025   1008 EKG:  Normal sinus rhythm at a rate of 89.  Normal intervals.  Normal axis.  No significant ST or T wave changes suggesting acute ischemia or infarction.  (Independently interpreted by me)   [MR]   1213 Patient feels improved but with some tachypnea and oxygen saturation around 88-89% on her normal 3 L nasal cannula oxygen.  Nasal cannula increased to optimize O2 sat. [MR]      ED Course User Index  [MR] Alexys Wasserman MD       MDM:    69 y.o. female with one-week of shortness of breath in the setting of resolved fever and cough with multiple sick contacts in the family.  Influenza testing sent here with multiple other viral etiologies certainly possible.  Chest x-ray done to exclude pneumonia.  Low suspicion for cardiac etiologies such as ACS with EKG sent for  risk stratification along with other laboratories to rule out end-organ dysfunction such as worsened anemia or renal dysfunction.  I have very low suspicion for PE.  I do not think D-dimer is indicated.  She is saturating well at rest on her normal nasal cannula oxygen here with some tachypnea I will reassess after breathing treatment and steroids.  I do not think she requires positive-pressure ventilation at this time.  Mental status is normal.    Patient reassessed with symptomatic improvement but with some persistent tachypnea and increased O2 requirement..  She is saturating well increased oxygen level.  Mental status remains normal here.  I do not think she requires BiPAP therapy at this point, but I do think she requires admission for COPD exacerbation for continued respiratory monitoring and therapy.  I have discussed with hospital medicine who will assume care.    Diagnoses:    1. SOB  2. COPD exacerbation       Alexys Wasserman MD  01/30/25 0584

## 2025-01-30 NOTE — PLAN OF CARE
[facility] - Emergency Dept  Initial Discharge Assessment       Primary Care Provider: Bassam Givens MD    Admission Diagnosis: COPD exacerbation [J44.1]    Admission Date: 1/30/2025  Expected Discharge Date:     SW met with patient bedside. Frank Sanchez, daughter, was present in the room assisting with assessment. SW verified demographics, insurance, supports, and PCP.  Patient reported living in the home with her two adult children. Patient reports her children transport her to medical appointments. SW assessed patient's needs. Patient reports she would benefit from a shower chair at home. Patient is able to complete ADLs independently. Patient verified at home DME: oxygen, rollator, walker, and nebulizer.  Patient verified No HH, No Dialysis, No Blood Thinners, and 3 liters of oxygen. Patient receives oxygen from [employer]. Patient has portable oxygen at bedside.     Patient verified pharmacy of choice: [facility]  Patient confirmed Frank Sanchez, daughter, will be source of transportation at the time of discharge.     Transition of Care Barriers: None    Payor: MEDICARE / Plan: MEDICARE PART A & B / Product Type: Government /     Extended Emergency Contact Information  Primary Emergency Contact: JOHN SANCHEZ  Home Phone: [phone]  Mobile Phone: [phone]  Relation: Son   needed? No    Discharge Plan A: Home  Discharge Plan B: Home      [facility] - [place] [address]  [address]  [address]  [place] [address]  Phone: [phone] Fax: [phone]      Initial Assessment (most recent)       Adult Discharge Assessment - 01/30/25 1518          Discharge Assessment    Assessment Type Discharge Planning Assessment     Confirmed/corrected address, phone number and insurance Yes     Confirmed Demographics Correct on Facesheet     Source of Information patient;family     When was your last doctors appointment? --   2-3 weeks ago    Does  patient/caregiver understand observation status Yes     Communicated ABRAN with patient/caregiver Date not available/Unable to determine     Reason For Admission COPD exacerbation     People in Home child(yobany), adult     Do you expect to return to your current living situation? Yes     Do you have help at home or someone to help you manage your care at home? No     Prior to hospitilization cognitive status: Unable to Assess     Current cognitive status: Alert/Oriented     Walking or Climbing Stairs Difficulty no     Dressing/Bathing Difficulty no     Home Accessibility wheelchair accessible     Home Layout Able to live on 1st floor     Equipment Currently Used at Home oxygen;nebulizer;walker, standard;rollator   Patient reported 3 liters of oxygen at home.    Readmission within 30 days? No     Patient currently being followed by outpatient case management? No     Do you currently have service(s) that help you manage your care at home? No     Do you take prescription medications? Yes     Do you have prescription coverage? Yes     Coverage Medicare and Medicaid     Do you have any problems affording any of your prescribed medications? No     Is the patient taking medications as prescribed? yes     Who is going to help you get home at discharge? Frank Juarez, daughter [phone]     How do you get to doctors appointments? family or friend will provide     Are you on dialysis? No     Do you take coumadin? No     Discharge Plan A Home     Discharge Plan B Home     DME Needed Upon Discharge  shower chair     Discharge Plan discussed with: Patient;Adult children     Transition of Care Barriers None

## 2025-01-30 NOTE — PLAN OF CARE
01/30/25 1526   LUNA Message   Medicare Outpatient and Observation Notification regarding financial responsibility Explained to patient/caregiver;Signed/date by patient/caregiver   Date LUNA was signed 01/30/25   Time LUNA was signed [id number]

## 2025-01-30 NOTE — H&P
Count includes the [facility] - Emergency Dept  Hospital Medicine  History & Physical    Patient Name: Clarita Gómez  MRN: [id number]  Patient Class: OP- Observation  Admission Date: 1/30/2025  Attending Physician: Jamie Ramires MD   Primary Care Provider: Bassam Givens MD         Patient information was obtained from patient, past medical records, and ER records.     Subjective:     Principal Problem:COPD exacerbation    Chief Complaint:   Chief Complaint   Patient presents with    Shortness of Breath     X1 week, unable to tolerate taking a shower this am. Wears 3L O2 at home    Weakness    Cough        HPI: 69 year old pt getting admitted with COPD exacerbation and anxiety  Several family members got sick and had influenzae  Pt said she had influenzae too and was suffering from SOB for past 1 week  She is on Home oxygen but still was very SOB  Symptoms went severe and she came to ER today & got admitted   Per pt she still smokes cigarettes everyday     Past Medical History:   Diagnosis Date    Anticardiolipin antibody positive     COPD (chronic obstructive pulmonary disease)     Hyperlipidemia     Hypothyroid     Personal history of tobacco use, presenting hazards to health     Splenic infarct        Past Surgical History:   Procedure Laterality Date    CHOLECYSTECTOMY      COLECTOMY      partial due to diverticulosis    COLONOSCOPY N/A 12/28/2023    Procedure: COLONOSCOPY;  Surgeon: Magdi Adames MD;  Location: [facility];  Service: Endoscopy;  Laterality: N/A;    HYSTERECTOMY      LAPAROSCOPIC REPAIR OF VENTRAL HERNIA      SMALL BOWEL ENTEROSCOPY N/A 12/28/2023    Procedure: PUSH ENTEROSCOPY;  Surgeon: Magdi Adames MD;  Location: [facility];  Service: Endoscopy;  Laterality: N/A;       Review of patient's allergies indicates:   Allergen Reactions    Zosyn [piperacillin-tazobactam] Swelling     Swelling to lips       No current facility-administered medications on file prior to encounter.     Current  "Outpatient Medications on File Prior to Encounter   Medication Sig    albuterol (VENTOLIN HFA) 90 mcg/actuation inhaler Inhale 2 puffs into the lungs every 6 (six) hours as needed for Wheezing. Rescue    cyanocobalamin 1,000 mcg/mL injection Inject 1 ml B 12 subq q 2 weeks. (Patient taking differently: Inject 1,000 mcg into the muscle every 14 (fourteen) days. Inject 1 ml B 12 subq q 2 weeks.)    ELIQUIS 5 mg Tab TAKE ONE TABLET BY MOUTH TWO TIMES A DAY    fluticasone-umeclidin-vilanter (TRELEGY ELLIPTA) 100-62.5-25 mcg DsDv Inhale 1 puff into the lungs once daily.    levothyroxine (SYNTHROID) 100 MCG tablet Take 1 tablet by mouth once daily.    pravastatin (PRAVACHOL) 40 MG tablet Take 40 mg by mouth once daily.    prenatal 105-iron-folic ac-dha 30 mg iron- 1.4 mg-300 mg Cmpk Take 1 tablet by mouth once daily.    syringe with needle (TUBERCULIN SYRINGE) 1 mL 27 x 1/2" Syrg Use for B 12 subq monthly injections.    [DISCONTINUED] acetaminophen (TYLENOL) 500 MG tablet Take 500 mg by mouth every 6 (six) hours as needed for Pain.    [DISCONTINUED] albuterol (PROVENTIL) 2.5 mg /3 mL (0.083 %) nebulizer solution Take 2.5 mg by nebulization every 6 (six) hours as needed for Wheezing. Rescue    [DISCONTINUED] albuterol (PROVENTIL/VENTOLIN HFA) 90 mcg/actuation inhaler Inhale 2 puffs into the lungs every 4 (four) hours as needed for Wheezing. Rescue    [DISCONTINUED] albuterol (VENTOLIN HFA) 90 mcg/actuation inhaler Inhale 2 puffs into the lungs every 6 (six) hours as needed for Wheezing. Rescue    [DISCONTINUED] albuterol (VENTOLIN HFA) 90 mcg/actuation inhaler Inhale 2 puffs into the lungs every 6 (six) hours as needed for Wheezing. Rescue    [DISCONTINUED] albuterol (VENTOLIN HFA) 90 mcg/actuation inhaler Inhale 2 puffs into the lungs every 6 (six) hours as needed for Wheezing. Rescue    [DISCONTINUED] ALBUTEROL INHL Inhale into the lungs.    [DISCONTINUED] diclofenac sodium (VOLTAREN) 1 % Gel     [DISCONTINUED] " "enoxaparin (LOVENOX) 40 mg/0.4 mL Syrg Inject into the skin.    [DISCONTINUED] levothyroxine (SYNTHROID) 112 MCG tablet levothyroxine 112 mcg tablet   Take 1 tablet every day by oral route.    [DISCONTINUED] ondansetron (ZOFRAN-ODT) 4 MG TbDL Take 4 mg by mouth.    [DISCONTINUED] syringe with needle, safety 3 mL 25 gauge x 5/8" Syrg 1 Syringe by Misc.(Non-Drug; Combo Route) route every 30 days.    [DISCONTINUED] TRELEGY ELLIPTA 100-62.5-25 mcg DsDv INHALE 1 PUFF INTO THE LUNGS ONCE DAILY     Family History       Problem Relation (Age of Onset)    Breast cancer Maternal Aunt    Heart disease Father    Lung cancer Brother    Ovarian cancer Sister          Tobacco Use    Smoking status: Every Day     Current packs/day: 0.50     Average packs/day: 0.5 packs/day for 40.0 years (20.0 ttl pk-yrs)     Types: Cigarettes    Smokeless tobacco: Never    Tobacco comments:     currently smoking 1/2 PPD     Smokes 10 cigarettes daily   Substance and Sexual Activity    Alcohol use: No    Drug use: No    Sexual activity: Not on file     Review of Systems   Constitutional:  Negative for activity change and appetite change.   HENT:  Negative for congestion and dental problem.    Eyes:  Negative for discharge and itching.   Respiratory:  Positive for cough and shortness of breath.    Cardiovascular:  Negative for chest pain.   Gastrointestinal:  Negative for abdominal distention and abdominal pain.   Endocrine: Negative for cold intolerance.   Genitourinary:  Negative for difficulty urinating and dysuria.   Musculoskeletal:  Negative for arthralgias and back pain.   Skin:  Negative for color change.   Neurological:  Negative for dizziness and facial asymmetry.   Hematological:  Negative for adenopathy.   Psychiatric/Behavioral:  Negative for agitation and behavioral problems.      Objective:     Vital Signs (Most Recent):  Temp: 98.7 °F (37.1 °C) (01/30/25 0907)  Pulse: (!) 120 (01/30/25 1700)  Resp: (!) 30 (01/30/25 1700)  BP: (!) " 170/75 (01/30/25 1700)  SpO2: (!) 94 % (01/30/25 1700) Vital Signs (24h Range):  Temp:  [98.7 °F (37.1 °C)] 98.7 °F (37.1 °C)  Pulse:  [] 120  Resp:  [18-34] 30  SpO2:  [92 %-98 %] 94 %  BP: (156-203)/() 170/75     Weight: 100.7 kg (222 lb)  Body mass index is 38.71 kg/m².     Physical Exam  Vitals and nursing note reviewed.   Constitutional:       General: She is not in acute distress.  HENT:      Head: Atraumatic.      Right Ear: External ear normal.      Left Ear: External ear normal.      Nose: Nose normal.      Mouth/Throat:      Mouth: Mucous membranes are moist.   Eyes:      Extraocular Movements: Extraocular movements intact.   Cardiovascular:      Rate and Rhythm: Normal rate.   Pulmonary:      Effort: Pulmonary effort is normal.   Musculoskeletal:         General: Normal range of motion.      Cervical back: Normal range of motion.   Skin:     General: Skin is warm.   Neurological:      Mental Status: She is alert and oriented to person, place, and time.   Psychiatric:         Behavior: Behavior normal.                Significant Labs: All pertinent labs within the past 24 hours have been reviewed.  CBC:   Recent Labs   Lab 01/30/25  1004 01/30/25  1535   WBC 10.28  --    HGB 14.3  --    HCT 42.9 49     --      CMP:   Recent Labs   Lab 01/30/25  1004      K 3.3*      CO2 32*      BUN 8   CREATININE 0.7   CALCIUM 9.1   PROT 7.2   ALBUMIN 3.8   BILITOT 0.3   ALKPHOS 43*   AST 20   ALT 18   ANIONGAP 6*       Significant Imaging: I have reviewed all pertinent imaging results/findings within the past 24 hours.    Assessment/Plan:     * COPD exacerbation  Patient's COPD is with exacerbation noted by continued dyspnea, use of accessory muscles for breathing, and worsening of baseline hypoxia currently.  Patient is currently on COPD Pathway. Continue scheduled inhalers Steroids, Antibiotics, and Supplemental oxygen and monitor respiratory status closely.     Obesity  Body mass  index is 38.71 kg/m². Morbid obesity complicates all aspects of disease management from diagnostic modalities to treatment.     Personal history of tobacco use, presenting hazards to health  Aware       Anti-cardiolipin antibody positive  On NOAC         VTE Risk Mitigation (From admission, onward)           Ordered     apixaban tablet 5 mg  2 times daily         01/30/25 1258     IP VTE HIGH RISK PATIENT  Once         01/30/25 1259     Place sequential compression device  Until discontinued         01/30/25 1259                                    Jamie Ramires MD  Department of Hospital Medicine  Formerly [facility] - Emergency Dept

## 2025-01-30 NOTE — CARE UPDATE
01/30/25 0958   Patient Assessment/Suction   Level of Consciousness (AVPU) alert   Respiratory Effort Short of breath   Expansion/Accessory Muscles/Retractions abdominal muscle use   All Lung Fields Breath Sounds wheezes, expiratory;diminished   Rhythm/Pattern, Respiratory shortness of breath   Cough Frequency frequent   Cough Type congested   PRE-TX-O2   Device (Oxygen Therapy) nasal cannula   $ Is the patient on Low Flow Oxygen? Yes   Flow (L/min) (Oxygen Therapy) 3   SpO2 95 %   Pulse Oximetry Type Continuous   $ Pulse Oximetry - Multiple Charge Pulse Oximetry - Multiple   Pulse 80   Resp 20   Aerosol Therapy   $ Aerosol Therapy Charges Aerosol Treatment   Daily Review of Necessity (SVN) completed   Respiratory Treatment Status (SVN) continuous   Treatment Route (SVN) mask;oxygen   Patient Position HOB elevated   Post Treatment Assessment (SVN) increased aeration;patient reports breathing improved   Signs of Intolerance (SVN) none   Breath Sounds Post-Respiratory Treatment   Throughout All Fields Post-Treatment All Fields   Throughout All Fields Post-Treatment aeration increased   Post-treatment Heart Rate (beats/min) 94   Post-treatment Resp Rate (breaths/min) 22   Education   $ Education Bronchodilator;Oxygen;45 min

## 2025-01-30 NOTE — CARE UPDATE
01/30/25 1531   Patient Assessment/Suction   Level of Consciousness (AVPU) alert   Respiratory Effort Moderate   Expansion/Accessory Muscles/Retractions accessory muscle use   All Lung Fields Breath Sounds clear;diminished   Rhythm/Pattern, Respiratory assisted mechanically   PRE-TX-O2   Device (Oxygen Therapy) BIPAP   $ Is the patient on High Flow Oxygen? Yes   Oxygen Concentration (%) 40   SpO2 97 %   Pulse (!) 140   Resp (!) 31   Preset CPAP/BiPAP Settings   Mode Of Delivery BiPAP S/T   $ CPAP/BiPAP Daily Charge 1   CPAP/BIPAP charged w/in last 24 h YES   $ Initial CPAP/BiPAP Setup? Yes   $ Is patient using? Yes   Size of Mask Small/Medium   Sized Appropriately? Yes   Equipment Type V60   Ipap 16   EPAP (cm H2O) 8   Pressure Support (cm H2O) 8   Set Rate (Breaths/Min) 18   ITime (sec) 0.9   Rise Time (sec) 3   Patient CPAP/BiPAP Settings   CPAP/BIPAP ID 1   Timed Inspiration (Sec) 0.9   IPAP Rise Time (sec) 3   RR Total (Breaths/Min) 31   Tidal Volume (mL) 624   VE Minute Ventilation (L/min) 20.3 L/min   Peak Inspiratory Pressure (cm H2O) 18   TiTOT (%) 33   Total Leak (L/Min) 10   Patient Trigger - ST Mode Only (%) 100   CPAP/BiPAP Backup Settings   Backup Rate 18 breaths per minute (bpm)   CPAP/BiPAP Alarms   High Pressure (cm H2O) 40   Low Pressure (cm H2O) 8   Minute Ventilation (L/Min) 3   High RR (breaths/min) 45   Low RR (breaths/min) 8   Apnea (Sec) 20

## 2025-01-30 NOTE — CARE UPDATE
Patient was in distress, breathsounds almost silent and patient sweating, stating cannot breath and stating she is tired from trying to breath, placed on bipap and patient distress improved with more air movement for breathsounds.

## 2025-01-30 NOTE — Clinical Note
Diagnosis: COPD exacerbation [241461]   Future Attending Provider: VANESSA MEJÍA [[id number]]   Place in Observation: [facility] [[id number]]   Special Needs:: No Special Needs [1]

## 2025-01-31 NOTE — SUBJECTIVE & OBJECTIVE
"Past Medical History:   Diagnosis Date    Anticardiolipin antibody positive     COPD (chronic obstructive pulmonary disease)     Hyperlipidemia     Hypothyroid     Personal history of tobacco use, presenting hazards to health     Splenic infarct        Past Surgical History:   Procedure Laterality Date    CHOLECYSTECTOMY      COLECTOMY      partial due to diverticulosis    COLONOSCOPY N/A 12/28/2023    Procedure: COLONOSCOPY;  Surgeon: Magdi Adames MD;  Location: [facility];  Service: Endoscopy;  Laterality: N/A;    HYSTERECTOMY      LAPAROSCOPIC REPAIR OF VENTRAL HERNIA      SMALL BOWEL ENTEROSCOPY N/A 12/28/2023    Procedure: PUSH ENTEROSCOPY;  Surgeon: Magdi Adames MD;  Location: [facility];  Service: Endoscopy;  Laterality: N/A;       Review of patient's allergies indicates:   Allergen Reactions    Zosyn [piperacillin-tazobactam] Swelling     Swelling to lips       No current facility-administered medications on file prior to encounter.     Current Outpatient Medications on File Prior to Encounter   Medication Sig    albuterol (VENTOLIN HFA) 90 mcg/actuation inhaler Inhale 2 puffs into the lungs every 6 (six) hours as needed for Wheezing. Rescue    cyanocobalamin 1,000 mcg/mL injection Inject 1 ml B 12 subq q 2 weeks. (Patient taking differently: Inject 1,000 mcg into the muscle every 14 (fourteen) days. Inject 1 ml B 12 subq q 2 weeks.)    ELIQUIS 5 mg Tab TAKE ONE TABLET BY MOUTH TWO TIMES A DAY    fluticasone-umeclidin-vilanter (TRELEGY ELLIPTA) 100-62.5-25 mcg DsDv Inhale 1 puff into the lungs once daily.    levothyroxine (SYNTHROID) 100 MCG tablet Take 1 tablet by mouth once daily.    pravastatin (PRAVACHOL) 40 MG tablet Take 40 mg by mouth once daily.    prenatal 105-iron-folic ac-dha 30 mg iron- 1.4 mg-300 mg Cmpk Take 1 tablet by mouth once daily.    syringe with needle (TUBERCULIN SYRINGE) 1 mL 27 x 1/2" Syrg Use for B 12 subq monthly injections.    [DISCONTINUED] acetaminophen (TYLENOL) 500 MG " "tablet Take 500 mg by mouth every 6 (six) hours as needed for Pain.    [DISCONTINUED] albuterol (PROVENTIL) 2.5 mg /3 mL (0.083 %) nebulizer solution Take 2.5 mg by nebulization every 6 (six) hours as needed for Wheezing. Rescue    [DISCONTINUED] albuterol (PROVENTIL/VENTOLIN HFA) 90 mcg/actuation inhaler Inhale 2 puffs into the lungs every 4 (four) hours as needed for Wheezing. Rescue    [DISCONTINUED] albuterol (VENTOLIN HFA) 90 mcg/actuation inhaler Inhale 2 puffs into the lungs every 6 (six) hours as needed for Wheezing. Rescue    [DISCONTINUED] albuterol (VENTOLIN HFA) 90 mcg/actuation inhaler Inhale 2 puffs into the lungs every 6 (six) hours as needed for Wheezing. Rescue    [DISCONTINUED] albuterol (VENTOLIN HFA) 90 mcg/actuation inhaler Inhale 2 puffs into the lungs every 6 (six) hours as needed for Wheezing. Rescue    [DISCONTINUED] ALBUTEROL INHL Inhale into the lungs.    [DISCONTINUED] diclofenac sodium (VOLTAREN) 1 % Gel     [DISCONTINUED] enoxaparin (LOVENOX) 40 mg/0.4 mL Syrg Inject into the skin.    [DISCONTINUED] levothyroxine (SYNTHROID) 112 MCG tablet levothyroxine 112 mcg tablet   Take 1 tablet every day by oral route.    [DISCONTINUED] ondansetron (ZOFRAN-ODT) 4 MG TbDL Take 4 mg by mouth.    [DISCONTINUED] syringe with needle, safety 3 mL 25 gauge x 5/8" Syrg 1 Syringe by Misc.(Non-Drug; Combo Route) route every 30 days.    [DISCONTINUED] TRELEGY ELLIPTA 100-62.5-25 mcg DsDv INHALE 1 PUFF INTO THE LUNGS ONCE DAILY     Family History       Problem Relation (Age of Onset)    Breast cancer Maternal Aunt    Heart disease Father    Lung cancer Brother    Ovarian cancer Sister          Tobacco Use    Smoking status: Every Day     Current packs/day: 0.50     Average packs/day: 0.5 packs/day for 40.0 years (20.0 ttl pk-yrs)     Types: Cigarettes    Smokeless tobacco: Never    Tobacco comments:     currently smoking 1/2 PPD     Smokes 10 cigarettes daily   Substance and Sexual Activity    Alcohol use: No "    Drug use: No    Sexual activity: Not on file     Review of Systems   Constitutional:  Negative for activity change and appetite change.   HENT:  Negative for congestion and dental problem.    Eyes:  Negative for discharge and itching.   Respiratory:  Positive for cough and shortness of breath.    Cardiovascular:  Negative for chest pain.   Gastrointestinal:  Negative for abdominal distention and abdominal pain.   Endocrine: Negative for cold intolerance.   Genitourinary:  Negative for difficulty urinating and dysuria.   Musculoskeletal:  Negative for arthralgias and back pain.   Skin:  Negative for color change.   Neurological:  Negative for dizziness and facial asymmetry.   Hematological:  Negative for adenopathy.   Psychiatric/Behavioral:  Negative for agitation and behavioral problems.      Objective:     Vital Signs (Most Recent):  Temp: 98.7 °F (37.1 °C) (01/30/25 0907)  Pulse: (!) 120 (01/30/25 1700)  Resp: (!) 30 (01/30/25 1700)  BP: (!) 170/75 (01/30/25 1700)  SpO2: (!) 94 % (01/30/25 1700) Vital Signs (24h Range):  Temp:  [98.7 °F (37.1 °C)] 98.7 °F (37.1 °C)  Pulse:  [] 120  Resp:  [18-34] 30  SpO2:  [92 %-98 %] 94 %  BP: (156-203)/() 170/75     Weight: 100.7 kg (222 lb)  Body mass index is 38.71 kg/m².     Physical Exam  Vitals and nursing note reviewed.   Constitutional:       General: She is not in acute distress.  HENT:      Head: Atraumatic.      Right Ear: External ear normal.      Left Ear: External ear normal.      Nose: Nose normal.      Mouth/Throat:      Mouth: Mucous membranes are moist.   Eyes:      Extraocular Movements: Extraocular movements intact.   Cardiovascular:      Rate and Rhythm: Normal rate.   Pulmonary:      Effort: Pulmonary effort is normal.   Musculoskeletal:         General: Normal range of motion.      Cervical back: Normal range of motion.   Skin:     General: Skin is warm.   Neurological:      Mental Status: She is alert and oriented to person, place, and  time.   Psychiatric:         Behavior: Behavior normal.                Significant Labs: All pertinent labs within the past 24 hours have been reviewed.  CBC:   Recent Labs   Lab 01/30/25  1004 01/30/25  1535   WBC 10.28  --    HGB 14.3  --    HCT 42.9 49     --      CMP:   Recent Labs   Lab 01/30/25  1004      K 3.3*      CO2 32*      BUN 8   CREATININE 0.7   CALCIUM 9.1   PROT 7.2   ALBUMIN 3.8   BILITOT 0.3   ALKPHOS 43*   AST 20   ALT 18   ANIONGAP 6*       Significant Imaging: I have reviewed all pertinent imaging results/findings within the past 24 hours.

## 2025-01-31 NOTE — PROGRESS NOTES
[facility] - Emergency Dept  Hospital Medicine  Progress Note    Patient Name: Clarita Gómez  MRN: [id number]  Patient Class: IP- Inpatient   Admission Date: 1/30/2025  Length of Stay: 0 days  Attending Physician: Jeff Maxwell DO  Primary Care Provider: Bassam Givens MD        Subjective     Principal Problem:COPD exacerbation        HPI:  69 year old pt getting admitted with COPD exacerbation and anxiety  Several family members got sick and had influenzae  Pt said she had influenzae too and was suffering from SOB for past 1 week  She is on Home oxygen but still was very SOB  Symptoms went severe and she came to ER today & got admitted   Per pt she still smokes cigarettes everyday     Overview/Hospital Course:  Pt is a 69-year-old female smoker who was admitted for COPD exacerbation and started on treatment with steroid and antibiotics..  Currently on 3 L oxygen at home.  Pulmonology was consulted since patient may benefit from BiPAP.  Offered nicotine patch however patient declined.    Interval History:  Patient said she feels much better now    Review of Systems   All other systems reviewed and are negative.    Objective:     Vital Signs (Most Recent):  Temp: 97.9 °F (36.6 °C) (01/31/25 0406)  Pulse: (!) 112 (01/31/25 1531)  Resp: (!) 22 (01/31/25 1531)  BP: (!) 150/72 (01/31/25 1100)  SpO2: 95 % (01/31/25 1531) Vital Signs (24h Range):  Temp:  [97.9 °F (36.6 °C)] 97.9 °F (36.6 °C)  Pulse:  [] 112  Resp:  [20-34] 22  SpO2:  [91 %-99 %] 95 %  BP: (147-182)/() 150/72     Weight: 100.7 kg (222 lb)  Body mass index is 38.71 kg/m².    Intake/Output Summary (Last 24 hours) at 1/31/2025 1706  Last data filed at 1/31/2025 0400  Gross per 24 hour   Intake --   Output 300 ml   Net -300 ml         Physical Exam  Constitutional:       Appearance: She is ill-appearing.   Cardiovascular:      Rate and Rhythm: Normal rate.      Pulses: Normal pulses.   Pulmonary:      Breath sounds: Wheezing and  rales present.   Neurological:      Mental Status: She is oriented to person, place, and time.             Significant Labs: All pertinent labs within the past 24 hours have been reviewed.    Significant Imaging: I have reviewed all pertinent imaging results/findings within the past 24 hours.    Assessment and Plan     * COPD exacerbation  Patient's COPD is with exacerbation noted by continued dyspnea, use of accessory muscles for breathing, and worsening of baseline hypoxia currently.  Patient is currently on COPD Pathway. Continue scheduled inhalers Steroids, Antibiotics, and Supplemental oxygen and monitor respiratory status closely.     Obesity  Body mass index is 38.71 kg/m². Morbid obesity complicates all aspects of disease management from diagnostic modalities to treatment.     Personal history of tobacco use, presenting hazards to health  Aware       Anti-cardiolipin antibody positive  On NOAC         VTE Risk Mitigation (From admission, onward)           Ordered     apixaban tablet 5 mg  2 times daily         01/30/25 1258     IP VTE HIGH RISK PATIENT  Once         01/30/25 1259     Place sequential compression device  Until discontinued         01/30/25 1259                    Discharge Planning   ABRAN: 2/1/2025     Code Status: DNR   Medical Readiness for Discharge Date:   Discharge Plan A: Efren Maxwell DO  Department of Hospital Medicine   [facility] - Emergency Dept

## 2025-01-31 NOTE — CONSULTS
Pulmonary/Critical Care Consult      PATIENT NAME: Clarita Gómez  MRN: [id number]  TODAY'S DATE: 2025  12:09 PM  ADMIT DATE: 2025  AGE: 69 y.o. : 1955    CONSULT REQUESTED BY: Jeff Maxwell DO    REASON FOR CONSULT:   COPD exacerbation    HPI:  The patient is a 69-year-old female smoker with severe COPD who is normally on 3 L of oxygen.  She has been progressively more dyspneic for the last week and last night thought she would die out of her shortness of breath.  She cut her cigarettes down to 10 a day.  She promises to quit.    REVIEW OF SYSTEMS  GENERAL: Feeling tired  EYES: Vision is good.  ENT: No sinusitis or pharyngitis.   HEART: No chest pain or palpitations.  LUNGS:  She is terribly dyspneic even at rest  GI: No Nausea, vomiting, constipation, diarrhea, or reflux.  : No dysuria, hesitancy, or nocturia.  SKIN: No lesions or rashes.  MUSCULOSKELETAL: No joint pain or myalgias.  NEURO: No headaches or neuropathy.  LYMPH: No edema or adenopathy.  PSYCH: No anxiety or depression.  ENDO: No weight change.    ALLERGIES  Review of patient's allergies indicates:   Allergen Reactions    Zosyn [piperacillin-tazobactam] Swelling     Swelling to lips       INPATIENT SCHEDULED MEDICATIONS   amLODIPine  10 mg Oral Daily    apixaban  5 mg Oral BID    azithromycin  500 mg Oral Daily    famotidine  20 mg Oral BID    ipratropium  0.5 mg Nebulization Q4H    levalbuterol  1.25 mg Nebulization Q8H    methylPREDNISolone injection (PEDS and ADULTS)  60 mg Intravenous Q6H         MEDICAL AND SURGICAL HISTORY  Past Medical History:   Diagnosis Date    Anticardiolipin antibody positive     COPD (chronic obstructive pulmonary disease)     Hyperlipidemia     Hypothyroid     Personal history of tobacco use, presenting hazards to health     Splenic infarct      Past Surgical History:   Procedure Laterality Date    CHOLECYSTECTOMY      COLECTOMY      partial due to diverticulosis    COLONOSCOPY N/A 2023     Procedure: COLONOSCOPY;  Surgeon: Magdi Adames MD;  Location: [facility] ENDO;  Service: Endoscopy;  Laterality: N/A;    HYSTERECTOMY      LAPAROSCOPIC REPAIR OF VENTRAL HERNIA      SMALL BOWEL ENTEROSCOPY N/A 12/28/2023    Procedure: PUSH ENTEROSCOPY;  Surgeon: Magdi Adames MD;  Location: [facility] ENDO;  Service: Endoscopy;  Laterality: N/A;       ALCOHOL, TOBACCO AND DRUG USE  Social History     Tobacco Use   Smoking Status Every Day    Current packs/day: 0.50    Average packs/day: 0.5 packs/day for 40.0 years (20.0 ttl pk-yrs)    Types: Cigarettes   Smokeless Tobacco Never   Tobacco Comments    currently smoking 1/2 PPD    Smokes 10 cigarettes daily     Social History     Substance and Sexual Activity   Alcohol Use No     Social History     Substance and Sexual Activity   Drug Use No       FAMILY HISTORY  Family History   Problem Relation Name Age of Onset    Breast cancer Maternal Aunt      Heart disease Father      Ovarian cancer Sister      Lung cancer Brother         VITAL SIGNS (MOST RECENT)  Temp: 97.9 °F (36.6 °C) (01/31/25 0406)  Pulse: 104 (01/31/25 1100)  Resp: 20 (01/31/25 1100)  BP: (!) 150/72 (01/31/25 1100)  SpO2: 95 % (01/31/25 1100)    INTAKE AND OUTPUT (LAST 24 HOURS):  Intake/Output Summary (Last 24 hours) at 1/31/2025 1209  Last data filed at 1/31/2025 0400  Gross per 24 hour   Intake --   Output 300 ml   Net -300 ml       WEIGHT  Wt Readings from Last 1 Encounters:   01/30/25 100.7 kg (222 lb)       PHYSICAL EXAM  GENERAL: Older patient in no distress, but dyspneic and looking tired.  HEENT: Pupils equal and reactive. Extraocular movements intact. Nose intact. Pharynx moist.  NECK: Supple.   HEART: Regular rate and rhythm. No murmur or gallop auscultated.  LUNGS:  There are markedly decreased breath sounds bilaterally.  There are faint expiratory wheezes at the end of expiration bilaterally.  Lung excursion symmetrical. No change in fremitus.   ABDOMEN: Bowel sounds present. Non-tender, no  masses palpated.  : Normal anatomy.  EXTREMITIES: Normal muscle tone and joint movement, no cyanosis or clubbing.   LYMPHATICS: No adenopathy palpated, no edema.  SKIN: Dry, intact, no lesions.   NEURO: Cranial nerves II-XII intact. Motor strength 5/5 bilaterally, upper and lower extremities.  PSYCH: Appropriate affect      CBC LAST (LAST 24 HOURS)  Recent Labs   Lab 01/30/25  1535   HCT 49       CHEMISTRY LAST (LAST 24 HOURS)  Recent Labs   Lab 01/30/25  1535   PH 7.365         CARDIAC PROFILE (LAST 24 HOURS)  Recent Labs   Lab 01/30/25  1004   TROPONINIHS 7.9       LAST 7 DAYS MICROBIOLOGY   Microbiology Results (last 7 days)       ** No results found for the last 168 hours. **            MOST RECENT IMAGING  X-Ray Chest 1 View  Narrative: CLINICAL HISTORY:  ([id number] y/o  (1955) F    SOB;    TECHNIQUE:  (A#[id number], exam time 1/30/2025 10:23)    XR CHEST 1 VIEW [id number]    COMPARISON:  Radiograph from 09/10/2018.    FINDINGS:  The lungs are clear. Minimal scattered reticular interstitial lung markings are seen at the lung bases, similar to the previous exam.  The right costophrenic sulcus is below the field of view secondary to technique.  No pneumothorax is identified. The heart is normal in size. Atheromatous calcifications are seen at the aortic arch. Osseous structures show degenerative changes in the spine. The visualized upper abdomen is unremarkable.  Impression: No acute cardiac or pulmonary process.    Electronically signed by: Sergey Dover  Date:    01/30/2025  Time:    10:26      CURRENT VISIT EKG  Results for orders placed or performed during the hospital encounter of 01/30/25   EKG 12-lead    Narrative    Ordered by an unspecified provider.       ECHOCARDIOGRAM RESULTS  No results found for this or any previous visit.        VENTILATOR INFORMATION  Oxygen Concentration (%):  [35-40] 35  9 L nasal cannula  BiPAP is set up in the room       LAST ARTERIAL BLOOD GAS  ABG presumably on BiPAP  16/8, rate of 18, FiO2 of 40%  Recent Labs   Lab 01/30/25  1535   PH 7.365   PO2 108*   PCO2 49.0*   HCO3 28.0   BE 3*       IMPRESSION AND PLAN  COPD exacerbation  - still very dyspneic and tight  - formoterol and budesonide  - levalbuterol ordered in place of albuterol, I am not sure why  - ipratropium  Acute on chronic hypercapnic hypoxemic respiratory failure  - currently on nasal cannula but has been on BiPAP and should continue to sleep and rest on BiPAP  Tobacco abuse  - counseled on quitting  Hypokalemia  - do not know if this is been corrected    Code status discussed and patient would not want to be on a ventilator, she would like to die quickly and without pain    Xiomy Hernandez MD  Date of Service: 01/31/2025  12:09 PM

## 2025-01-31 NOTE — SUBJECTIVE & OBJECTIVE
Interval History:  Patient said she feels much better now    Review of Systems   All other systems reviewed and are negative.    Objective:     Vital Signs (Most Recent):  Temp: 97.9 °F (36.6 °C) (01/31/25 0406)  Pulse: (!) 112 (01/31/25 1531)  Resp: (!) 22 (01/31/25 1531)  BP: (!) 150/72 (01/31/25 1100)  SpO2: 95 % (01/31/25 1531) Vital Signs (24h Range):  Temp:  [97.9 °F (36.6 °C)] 97.9 °F (36.6 °C)  Pulse:  [] 112  Resp:  [20-34] 22  SpO2:  [91 %-99 %] 95 %  BP: (147-182)/() 150/72     Weight: 100.7 kg (222 lb)  Body mass index is 38.71 kg/m².    Intake/Output Summary (Last 24 hours) at 1/31/2025 1706  Last data filed at 1/31/2025 0400  Gross per 24 hour   Intake --   Output 300 ml   Net -300 ml         Physical Exam  Constitutional:       Appearance: She is ill-appearing.   Cardiovascular:      Rate and Rhythm: Normal rate.      Pulses: Normal pulses.   Pulmonary:      Breath sounds: Wheezing and rales present.   Neurological:      Mental Status: She is oriented to person, place, and time.             Significant Labs: All pertinent labs within the past 24 hours have been reviewed.    Significant Imaging: I have reviewed all pertinent imaging results/findings within the past 24 hours.

## 2025-01-31 NOTE — HPI
69 year old pt getting admitted with COPD exacerbation and anxiety  Several family members got sick and had influenzae  Pt said she had influenzae too and was suffering from SOB for past 1 week  She is on Home oxygen but still was very SOB  Symptoms went severe and she came to ER today & got admitted   Per pt she still smokes cigarettes everyday

## 2025-01-31 NOTE — ASSESSMENT & PLAN NOTE
Body mass index is 38.71 kg/m². Morbid obesity complicates all aspects of disease management from diagnostic modalities to treatment.

## 2025-01-31 NOTE — HOSPITAL COURSE
Pt is a 69-year-old female smoker with chronic respiratory failure who was admitted for COPD exacerbation and started on treatment with breathing treatments, steroid, and antibiotics.  Patient's symptoms worsened.  She expressed desire for comfort care and this transition was made.  Transitioned to  hospice.

## 2025-02-01 PROBLEM — J96.21 ACUTE ON CHRONIC RESPIRATORY FAILURE WITH HYPOXIA AND HYPERCAPNIA: Status: ACTIVE | Noted: 2025-01-01

## 2025-02-01 PROBLEM — J96.22 ACUTE ON CHRONIC RESPIRATORY FAILURE WITH HYPOXIA AND HYPERCAPNIA: Status: ACTIVE | Noted: 2025-01-01

## 2025-02-01 PROBLEM — Z51.5 COMFORT MEASURES ONLY STATUS: Status: ACTIVE | Noted: 2025-01-01

## 2025-02-01 NOTE — ASSESSMENT & PLAN NOTE
Body mass index is 38.19 kg/m². Morbid obesity complicates all aspects of disease management from diagnostic modalities to treatment.

## 2025-02-01 NOTE — PROGRESS NOTES
Pulmonary/Critical Care Consult      PATIENT NAME: Clarita Gómez  MRN: [id number]  TODAY'S DATE: 2025  12:09 PM  ADMIT DATE: 2025  AGE: 69 y.o. : 1955    CONSULT REQUESTED BY: Fabian Light MD    REASON FOR CONSULT:   COPD exacerbation    HPI:  The patient is a 69-year-old female smoker with severe COPD who is normally on 3 L of oxygen.  She has been progressively more dyspneic for the last week and last night thought she would die out of her shortness of breath.  She cut her cigarettes down to 10 a day.  She promises to quit.     the patient is extremely dyspneic on BiPAP.  She wishes to stop her treatment and move to comfort care.  She does not want to suffer.  She states she wants no pain.  I have called the patient's son Pierre who states she does not have a psych history and is capable of making her own decisions.  He says they are on their way up.  Will start a morphine drip and remove her BiPAP and make sure she stays comfortable    REVIEW OF SYSTEMS  GENERAL: Feeling tired  EYES: Vision is good.  ENT: No sinusitis or pharyngitis.   HEART: No chest pain or palpitations.  LUNGS:  She is terribly dyspneic even at rest  GI: No Nausea, vomiting, constipation, diarrhea, or reflux.  : No dysuria, hesitancy, or nocturia.  SKIN: No lesions or rashes.  MUSCULOSKELETAL: No joint pain or myalgias.  NEURO: No headaches or neuropathy.  LYMPH: No edema or adenopathy.  PSYCH: No anxiety or depression.  ENDO: No weight change.    No change in the patient's Past Medical History, Past Surgical History, Social History or Family History since admission.      VITAL SIGNS (MOST RECENT)  Temp: 97.8 °F (36.6 °C) (25)  Pulse: (!) 116 (25)  Resp: 20 (25)  BP: 117/70 (25)  SpO2: (!) 93 % (25)    INTAKE AND OUTPUT (LAST 24 HOURS):  Intake/Output Summary (Last 24 hours) at 2025 0908  Last data filed at 2025 0555  Gross per 24 hour   Intake 120 ml   Output  151 ml   Net -31 ml       WEIGHT  Wt Readings from Last 1 Encounters:   01/31/25 97.8 kg (215 lb 9.8 oz)       PHYSICAL EXAM  GENERAL: Older patient on BiPAP, looking  dyspneic and looking tired.  HEENT: Pupils equal and reactive. Extraocular movements intact. Nose intact. Pharynx moist.  NECK: Supple.   HEART:  Tachycardic rate and rhythm. No murmur or gallop auscultated.  LUNGS:  There are markedly decreased breath sounds bilaterally.    Lung excursion symmetrical. No change in fremitus.   ABDOMEN: Bowel sounds present. Non-tender, no masses palpated.  : Normal anatomy.  EXTREMITIES: Normal muscle tone and joint movement, no cyanosis or clubbing.   LYMPHATICS: No adenopathy palpated, no edema.  SKIN: Dry, intact, no lesions.   NEURO: Cranial nerves II-XII intact. Motor strength 5/5 bilaterally, upper and lower extremities.  PSYCH: Appropriate affect      CBC LAST (LAST 24 HOURS)  Recent Labs   Lab 02/01/25  0412   WBC 22.01*   RBC 4.89   HGB 14.8   HCT 46.0   MCV 94   MCH 30.3   MCHC 32.2   RDW 12.7      MPV 10.0   GRAN 91.5*  20.1*   LYMPH 4.9*  1.1   MONO 2.7*  0.6   BASO 0.03   NRBC 0       CHEMISTRY LAST (LAST 24 HOURS)  Recent Labs   Lab 02/01/25  0305 02/01/25  0412   NA  --  139   K  --  3.6   CL  --  99   CO2  --  29   ANIONGAP  --  11   BUN  --  23   CREATININE  --  0.7   GLU  --  145*   CALCIUM  --  9.7   PH 7.401  --    MG  --  1.8         CARDIAC PROFILE (LAST 24 HOURS)  Recent Labs   Lab 01/30/25  1004   TROPONINIHS 7.9           MOST RECENT IMAGING  X-Ray Chest 1 View  Narrative: CLINICAL HISTORY:  ([id number] y/o  (1955) F    SOB;    TECHNIQUE:  (A#[id number], exam time 1/30/2025 10:23)    XR CHEST 1 VIEW IMG34    COMPARISON:  Radiograph from 09/10/2018.    FINDINGS:  The lungs are clear. Minimal scattered reticular interstitial lung markings are seen at the lung bases, similar to the previous exam.  The right costophrenic sulcus is below the field of view secondary to technique.   No pneumothorax is identified. The heart is normal in size. Atheromatous calcifications are seen at the aortic arch. Osseous structures show degenerative changes in the spine. The visualized upper abdomen is unremarkable.  Impression: No acute cardiac or pulmonary process.    Electronically signed by: Sergey Dover  Date:    01/30/2025  Time:    10:26      CURRENT VISIT EKG  Results for orders placed or performed during the hospital encounter of 01/30/25   EKG 12-lead    Narrative    Ordered by an unspecified provider.       ECHOCARDIOGRAM RESULTS  No results found for this or any previous visit.        BiPAP INFORMATION  Oxygen Concentration (%):  [35-40] 35  12/6, FiO2 0.4       LAST ARTERIAL BLOOD GAS  ABG presumably on BiPAP 16/8, rate of 18, FiO2 of 40%  Recent Labs   Lab 02/01/25  0305   PH 7.401   PO2 101*   PCO2 48.2*   HCO3 30.0*   BE 5*       IMPRESSION AND PLAN  COPD exacerbation  - still very dyspneic and tight  - formoterol and budesonide  - levalbuterol ordered in place of albuterol, I am not sure why  - ipratropium  Acute on chronic hypercapnic hypoxemic respiratory failure  - on BiPAP and still dyspneic  Tobacco abuse  - counseled on quitting  Hypokalemia  - replace and trend    Patient states she wishes to move to comfort care.  She is tired of hurting and tired of being so short of breath.  Have informed her son Myles of this.  He is not objecting.  Will place orders for a morphine drip, Ativan IV push and comfort care.    Code status discussed and patient would not want to be on a ventilator, she would like to die quickly and without pain    Xiomy Hernandez MD  Date of Service: 02/01/2025  12:09 PM

## 2025-02-01 NOTE — PLAN OF CARE
SW spoke with patient's son about alternative options if patient's condition stabilizes and no longer meets the criteria for inpatient hospice. Patient's son stated that home hospice is the back-up option. BALDOMERO received approval for inpatient hospice from Alethea Christy LCSW leader on call.           02/01/25 1554   Final Note   Assessment Type Final Discharge Note   Anticipated Discharge Disposition [facility]

## 2025-02-01 NOTE — PLAN OF CARE
BALDOMERO spoke with KIRSTEN Patrick, from [facility]. Darnell stated that he met with patient to complete his assessment and that patient met criteria for inpatient hospice. Attending MD and patient's nurse were notified. Patient will transfer to inpatient hospice on 2/1/2025.

## 2025-02-01 NOTE — ASSESSMENT & PLAN NOTE
From exacerbation of end-stage COPD  -continue nebs  -BiPAP as needed  -other therapies were stopped due to comfort care  -morphine drip

## 2025-02-01 NOTE — SUBJECTIVE & OBJECTIVE
Past Medical History:   Diagnosis Date    Anticardiolipin antibody positive     COPD (chronic obstructive pulmonary disease)     Hyperlipidemia     Hypothyroid     Personal history of tobacco use, presenting hazards to health     Splenic infarct        Past Surgical History:   Procedure Laterality Date    CHOLECYSTECTOMY      COLECTOMY      partial due to diverticulosis    COLONOSCOPY N/A 12/28/2023    Procedure: COLONOSCOPY;  Surgeon: Magdi Adames MD;  Location: [facility];  Service: Endoscopy;  Laterality: N/A;    HYSTERECTOMY      LAPAROSCOPIC REPAIR OF VENTRAL HERNIA      SMALL BOWEL ENTEROSCOPY N/A 12/28/2023    Procedure: PUSH ENTEROSCOPY;  Surgeon: Magdi Adames MD;  Location: [facility];  Service: Endoscopy;  Laterality: N/A;       Review of patient's allergies indicates:   Allergen Reactions    Zosyn [piperacillin-tazobactam] Swelling     Swelling to lips       Current Facility-Administered Medications on File Prior to Encounter   Medication    ALPRAZolam tablet 0.5 mg    ipratropium 0.02 % nebulizer solution 0.5 mg    levalbuterol nebulizer solution 1.25 mg    LORazepam injection 2 mg    morphine 100 mg (1 mg/mL) in NACL 100 mL infusion (TITRATING)    [DISCONTINUED] amLODIPine tablet 10 mg    [DISCONTINUED] apixaban tablet 5 mg    [DISCONTINUED] arformoteroL nebulizer solution 15 mcg    [DISCONTINUED] azithromycin tablet 500 mg    [DISCONTINUED] budesonide nebulizer solution 0.5 mg    [DISCONTINUED] famotidine tablet 20 mg    [DISCONTINUED] hydrALAZINE injection 10 mg    [DISCONTINUED] labetaloL injection 10 mg    [DISCONTINUED] levalbuterol nebulizer solution 1.25 mg    [DISCONTINUED] magnesium oxide tablet 800 mg    [DISCONTINUED] magnesium oxide tablet 800 mg    [DISCONTINUED] methylPREDNISolone sodium succinate injection 60 mg    [DISCONTINUED] potassium bicarbonate disintegrating tablet 35 mEq    [DISCONTINUED] potassium bicarbonate disintegrating tablet 50 mEq    [DISCONTINUED] potassium  "bicarbonate disintegrating tablet 60 mEq    [DISCONTINUED] potassium, sodium phosphates 280-160-250 mg packet 2 packet    [DISCONTINUED] potassium, sodium phosphates 280-160-250 mg packet 2 packet    [DISCONTINUED] potassium, sodium phosphates 280-160-250 mg packet 2 packet    [DISCONTINUED] sodium chloride 0.9% flush 3 mL     Current Outpatient Medications on File Prior to Encounter   Medication Sig    albuterol (VENTOLIN HFA) 90 mcg/actuation inhaler Inhale 2 puffs into the lungs every 6 (six) hours as needed for Wheezing. Rescue    cyanocobalamin 1,000 mcg/mL injection Inject 1 ml B 12 subq q 2 weeks. (Patient taking differently: Inject 1,000 mcg into the muscle every 14 (fourteen) days. Inject 1 ml B 12 subq q 2 weeks.)    ELIQUIS 5 mg Tab TAKE ONE TABLET BY MOUTH TWO TIMES A DAY    fluticasone-umeclidin-vilanter (TRELEGY ELLIPTA) 100-62.5-25 mcg DsDv Inhale 1 puff into the lungs once daily.    levothyroxine (SYNTHROID) 100 MCG tablet Take 1 tablet by mouth once daily.    pravastatin (PRAVACHOL) 40 MG tablet Take 40 mg by mouth once daily.    prenatal 105-iron-folic ac-dha 30 mg iron- 1.4 mg-300 mg Cmpk Take 1 tablet by mouth once daily.    syringe with needle (TUBERCULIN SYRINGE) 1 mL 27 x 1/2" Syrg Use for B 12 subq monthly injections.     Family History       Problem Relation (Age of Onset)    Breast cancer Maternal Aunt    Heart disease Father    Lung cancer Brother    Ovarian cancer Sister          Tobacco Use    Smoking status: Every Day     Current packs/day: 0.50     Average packs/day: 0.5 packs/day for 40.0 years (20.0 ttl pk-yrs)     Types: Cigarettes    Smokeless tobacco: Never    Tobacco comments:     currently smoking 1/2 PPD     Smokes 10 cigarettes daily   Substance and Sexual Activity    Alcohol use: No    Drug use: No    Sexual activity: Not on file       Objective:     Vital Signs (Most Recent):    Vital Signs (24h Range):  Temp:  [97 °F (36.1 °C)-97.8 °F (36.6 °C)] 97.8 °F (36.6 °C)  Pulse:  " [] 106  Resp:  [11-30] 11  SpO2:  [88 %-99 %] 88 %  BP: (117-182)/(55-79) 157/71        There is no height or weight on file to calculate BMI. See prior encounter     Physical Exam  Vitals reviewed.   Constitutional:       General: She is not in acute distress.     Appearance: She is ill-appearing.      Comments: BiPAP   HENT:      Head: Normocephalic and atraumatic.   Cardiovascular:      Rate and Rhythm: Regular rhythm. Tachycardia present.   Pulmonary:      Effort: Pulmonary effort is normal. No respiratory distress.   Neurological:      General: No focal deficit present.      Comments: Lethargic                Significant Labs: All pertinent labs within the past 24 hours have been reviewed.    Significant Imaging: I have reviewed all pertinent imaging results/findings within the past 24 hours.

## 2025-02-01 NOTE — CARE UPDATE
02/01/25 0652   Patient Assessment/Suction   Level of Consciousness (AVPU) alert   Respiratory Effort Slight;Labored   Expansion/Accessory Muscles/Retractions no use of accessory muscles   All Lung Fields Breath Sounds Anterior:;Posterior:   MINI Breath Sounds diminished   LLL Breath Sounds wheezes, expiratory   RUL Breath Sounds diminished   Rhythm/Pattern, Respiratory assisted mechanically   Cough Frequency infrequent   PRE-TX-O2   Device (Oxygen Therapy) BIPAP   $ Is the patient on High Flow Oxygen? Yes   Oxygen Concentration (%) 35   SpO2 (!) 92 %   Pulse Oximetry Type Continuous   $ Pulse Oximetry - Multiple Charge Pulse Oximetry - Multiple   Pulse (!) 120   Resp 17   Aerosol Therapy   $ Aerosol Therapy Charges Aerosol Treatment   Daily Review of Necessity (SVN) completed   Respiratory Treatment Status (SVN) given   Treatment Route (SVN) in-line   Patient Position semi-Arredondo's   Post Treatment Assessment (SVN) increased aeration   Signs of Intolerance (SVN) none   Breath Sounds Post-Respiratory Treatment   Throughout All Fields Post-Treatment All Fields   Throughout All Fields Post-Treatment aeration increased   Post-treatment Heart Rate (beats/min) 115   Post-treatment Resp Rate (breaths/min) 21   Ready to Wean/Extubation Screen   FIO2<=50 (chart decimal) 0.35   Preset CPAP/BiPAP Settings   Mode Of Delivery BiPAP S/T   $ CPAP/BiPAP Daily Charge 1   CPAP/BIPAP charged w/in last 24 h YES   $ Initial CPAP/BiPAP Setup? No   $ Is patient using? Yes   Size of Mask Small/Medium   Sized Appropriately? Yes   Equipment Type V60   Airway Device Type medium full face mask   Ipap 12   EPAP (cm H2O) 6   Pressure Support (cm H2O) 6   Set Rate (Breaths/Min) 12   ITime (sec) 1   Rise Time (sec) 3   Patient CPAP/BiPAP Settings   CPAP/BIPAP ID 1   Timed Inspiration (Sec) 1   IPAP Rise Time (sec) 3   RR Total (Breaths/Min) 24   Tidal Volume (mL) 721   VE Minute Ventilation (L/min) 14.5 L/min   Peak Inspiratory Pressure (cm  H2O) 13   TiTOT (%) 27   Total Leak (L/Min) 0   Patient Trigger - ST Mode Only (%) 95   CPAP/BiPAP Alarms   High Pressure (cm H2O) 40   Low Pressure (cm H2O) 8   Minute Ventilation (L/Min) 3   High RR (breaths/min) 45   Low RR (breaths/min) 8   Apnea (Sec) 20   Education   $ Education Bronchodilator;BiPAP;30 min

## 2025-02-01 NOTE — PLAN OF CARE
Attending MD requested inpatient hospice consult. BALDOMERO spoke with KIRSTEN Patrick, from [facility]. Darnell stated that he will meet with patient on 2/1/2025 to discuss inpatient hospice option.

## 2025-02-01 NOTE — DISCHARGE SUMMARY
[facility]  Discharge Summary      Patient Name: Clarita Gómez  MRN: [id number]  MAUREEN: [id number]  Patient Class: IP- Inpatient  Admission Date: 1/30/2025  Hospital Length of Stay: 1 days  Discharge Date and Time:  02/01/2025 4:40 PM  Attending Physician: Fabian Light MD   Discharging Provider: Fabian Light MD  Primary Care Provider: Bassam Givens MD    Primary Care Team: Networked reference to record PCT     HPI:   69 year old pt getting admitted with COPD exacerbation and anxiety  Several family members got sick and had influenzae  Pt said she had influenzae too and was suffering from SOB for past 1 week  She is on Home oxygen but still was very SOB  Symptoms went severe and she came to ER today & got admitted   Per pt she still smokes cigarettes everyday     * No surgery found *      Hospital Course:   Pt is a 69-year-old female smoker with chronic respiratory failure who was admitted for COPD exacerbation and started on treatment with breathing treatments, steroid, and antibiotics.  Patient's symptoms worsened.  She expressed desire for comfort care and this transition was made.  Transitioned to IP hospice.     Goals of Care Treatment Preferences:  Code Status: DNR      SDOH Screening:  The patient was screened for utility difficulties, food insecurity, transport difficulties, housing insecurity, and interpersonal safety and there were no concerns identified this admission.     Consults:   Consults (From admission, onward)          Status Ordering Provider     Inpatient consult to Pulmonology  Once        Provider:  Xiomy Hernandez MD    Completed ALEX SMALL              Final Active Diagnoses:    Diagnosis Date Noted POA    PRINCIPAL PROBLEM:  Acute on chronic respiratory failure with hypoxia and hypercapnia [J96.21, J96.22] 02/01/2025 Yes    Comfort measures only status [Z51.5] 02/01/2025 Not Applicable    COPD exacerbation [J44.1] 01/30/2025 Yes    Obesity [E66.9]  "01/30/2025 Yes    Personal history of tobacco use, presenting hazards to health [Z87.891] 03/31/2021 Not Applicable    Anti-cardiolipin antibody positive [R76.0] 11/26/2018 Yes      Problems Resolved During this Admission:       Discharged Condition: poor    Transitioned to  hospice in different EMR encounter.    Significant Diagnostic Studies: none    Pending Diagnostic Studies:       Procedure Component Value Units Date/Time    HIV 1/2 Ag/Ab (4th Gen) [[id number]] Collected: 01/30/25 1004    Order Status: Sent Lab Status: In process Updated: 01/30/25 1022    Specimen: Blood     Hepatitis C Antibody [[id number]] Collected: 01/30/25 1004    Order Status: Sent Lab Status: In process Updated: 01/30/25 1022    Specimen: Blood            Medications:  Reconciled Home Medications:      Medication List        ASK your doctor about these medications      albuterol 90 mcg/actuation inhaler  Commonly known as: VENTOLIN HFA  Inhale 2 puffs into the lungs every 6 (six) hours as needed for Wheezing. Rescue  Ask about: Which instructions should I use?     cyanocobalamin 1,000 mcg/mL injection  Inject 1 ml B 12 subq q 2 weeks.     ELIQUIS 5 mg Tab  Generic drug: apixaban  TAKE ONE TABLET BY MOUTH TWO TIMES A DAY     levothyroxine 100 MCG tablet  Commonly known as: SYNTHROID  Take 1 tablet by mouth once daily.  Ask about: Which instructions should I use?     pravastatin 40 MG tablet  Commonly known as: PRAVACHOL  Take 40 mg by mouth once daily.     prenatal 105-iron-folic ac-dha 30 mg iron- 1.4 mg-300 mg Cmpk  Take 1 tablet by mouth once daily.     TRELEGY ELLIPTA 100-62.5-25 mcg Dsdv  Generic drug: fluticasone-umeclidin-vilanter  Inhale 1 puff into the lungs once daily.  Ask about: Which instructions should I use?     TUBERCULIN SYRINGE 1 mL 27 x 1/2" Syrg  Generic drug: syringe with needle  Use for B 12 subq monthly injections.              Indwelling Lines/Drains at time of discharge:   Lines/Drains/Airways       Drain  " Duration             Female External Urinary Catheter w/ Suction 01/31/25 1900 <1 day                    Time spent on the discharge of patient: 35 minutes         Fabian Light MD  Department of Hospital Medicine  [facility]

## 2025-02-01 NOTE — SUBJECTIVE & OBJECTIVE
Interval History:  Seen on a.m. rounds.  Patient desiring comfort care.  Transitioned to comfort care.  Placed on morphine drip.  Hospice consulted and planning to transition to inpatient hospice.  Family bedside.      Objective:     Vital Signs (Most Recent):  Temp:  (no temp per nurse) (02/01/25 1100)  Pulse: 106 (02/01/25 1256)  Resp: 11 (02/01/25 1256)  BP: (!) 182/79 (02/01/25 0900)  SpO2: (!) 88 % (02/01/25 1256) Vital Signs (24h Range):  Temp:  [97 °F (36.1 °C)-97.8 °F (36.6 °C)] 97.8 °F (36.6 °C)  Pulse:  [] 106  Resp:  [11-30] 11  SpO2:  [88 %-99 %] 88 %  BP: (117-182)/(55-87) 182/79     Weight: 97.8 kg (215 lb 9.8 oz)  Body mass index is 38.19 kg/m².    Intake/Output Summary (Last 24 hours) at 2/1/2025 1406  Last data filed at 2/1/2025 1135  Gross per 24 hour   Intake 128.29 ml   Output 151 ml   Net -22.71 ml         Physical Exam  Vitals reviewed.   Constitutional:       General: She is not in acute distress.     Appearance: She is ill-appearing.      Comments: BiPAP   HENT:      Head: Normocephalic and atraumatic.   Cardiovascular:      Rate and Rhythm: Regular rhythm. Tachycardia present.   Pulmonary:      Effort: Pulmonary effort is normal. No respiratory distress.   Neurological:      General: No focal deficit present.      Comments: Lethargic             Significant Labs: All pertinent labs within the past 24 hours have been reviewed.    Significant Imaging: I have reviewed all pertinent imaging results/findings within the past 24 hours.

## 2025-02-01 NOTE — HPI
Pt is a 69-year-old female smoker with chronic respiratory failure who was admitted for COPD exacerbation for which symptoms worsened despite treatment and she desired to pursue comfort measures only. She was transitioned to IP hospice for this encounter. Family bedside.

## 2025-02-01 NOTE — PLAN OF CARE
Problem: COPD (Chronic Obstructive Pulmonary Disease)  Goal: Optimal Chronic Illness Coping  Outcome: Progressing  Goal: Optimal Level of Functional [place]  Outcome: Progressing  Goal: Absence of Infection Signs and Symptoms  Outcome: Progressing  Goal: Improved Oral Intake  Outcome: Progressing  Goal: Effective Oxygenation and Ventilation  Outcome: Progressing     Problem: Adult Inpatient Plan of Care  Goal: Plan of Care Review  Outcome: Progressing  Goal: Patient-Specific Goal (Individualized)  Outcome: Progressing  Goal: Absence of Hospital-Acquired Illness or Injury  Outcome: Progressing  Goal: Optimal Comfort and Wellbeing  Outcome: Progressing  Goal: Readiness for Transition of Care  Outcome: Progressing     Problem: Fall Injury Risk  Goal: Absence of Fall and Fall-Related Injury  Outcome: Progressing

## 2025-02-01 NOTE — NURSING
"Patient with very increased work of breathing, RR of 35 noted stating that "she is tired and does not want to do this anymore" patient wishing to withdraw care and have comfort measures only.  Dr. Light and Dr. Hernandez notified.    0900 Dr. Hernandez to bedside.  MD spoke with pt family and orders placed for comfort care.  "

## 2025-02-01 NOTE — PROGRESS NOTES
[facility] Medicine  Progress Note    Patient Name: Clarita Gómez  MRN: [id number]  Patient Class: IP- Inpatient   Admission Date: 1/30/2025  Length of Stay: 1 days  Attending Physician: Fabian Light MD  Primary Care Provider: Bassam Givens MD        Subjective     Principal Problem:Acute on chronic respiratory failure with hypoxia and hypercapnia        HPI:  69 year old pt getting admitted with COPD exacerbation and anxiety  Several family members got sick and had influenzae  Pt said she had influenzae too and was suffering from SOB for past 1 week  She is on Home oxygen but still was very SOB  Symptoms went severe and she came to ER today & got admitted   Per pt she still smokes cigarettes everyday     Overview/Hospital Course:  Pt is a 69-year-old female smoker with chronic respiratory failure who was admitted for COPD exacerbation and started on treatment with breathing treatments, steroid, and antibiotics.  Patient's symptoms worsened.  She expressed desire for comfort care and this transition was made.  Consulted IP hospice.    Interval History:  Seen on a.m. rounds.  Patient desiring comfort care.  Transitioned to comfort care.  Placed on morphine drip.  Hospice consulted and planning to transition to inpatient hospice.  Family bedside.      Objective:     Vital Signs (Most Recent):  Temp:  (no temp per nurse) (02/01/25 1100)  Pulse: 106 (02/01/25 1256)  Resp: 11 (02/01/25 1256)  BP: (!) 182/79 (02/01/25 0900)  SpO2: (!) 88 % (02/01/25 1256) Vital Signs (24h Range):  Temp:  [97 °F (36.1 °C)-97.8 °F (36.6 °C)] 97.8 °F (36.6 °C)  Pulse:  [] 106  Resp:  [11-30] 11  SpO2:  [88 %-99 %] 88 %  BP: (117-182)/(55-87) 182/79     Weight: 97.8 kg (215 lb 9.8 oz)  Body mass index is 38.19 kg/m².    Intake/Output Summary (Last 24 hours) at 2/1/2025 1406  Last data filed at 2/1/2025 1135  Gross per 24 hour   Intake 128.29 ml   Output 151 ml   Net -22.71 ml         Physical Exam  Vitals  reviewed.   Constitutional:       General: She is not in acute distress.     Appearance: She is ill-appearing.      Comments: BiPAP   HENT:      Head: Normocephalic and atraumatic.   Cardiovascular:      Rate and Rhythm: Regular rhythm. Tachycardia present.   Pulmonary:      Effort: Pulmonary effort is normal. No respiratory distress.   Neurological:      General: No focal deficit present.      Comments: Lethargic             Significant Labs: All pertinent labs within the past 24 hours have been reviewed.    Significant Imaging: I have reviewed all pertinent imaging results/findings within the past 24 hours.    Assessment and Plan     * Acute on chronic respiratory failure with hypoxia and hypercapnia  From exacerbation of end-stage COPD  -continue nebs  -BiPAP as needed  -other therapies were stopped due to comfort care  -morphine drip    Comfort measures only status  Transitioned to this  -morphine drip  -p.r.n. Ativan  -IP hospice consult    Obesity  Body mass index is 38.19 kg/m². Morbid obesity complicates all aspects of disease management from diagnostic modalities to treatment.     COPD exacerbation  Per respiratory failure section    Personal history of tobacco use, presenting hazards to health  Aware     Anti-cardiolipin antibody positive  On NOAC outpatient.  This was stopped for comfort care      VTE Risk Mitigation (From admission, onward)      None            Discharge Planning   ABRAN:  2/3    Code Status: DNR   Medical Readiness for Discharge Date:   Discharge Plan A: Home                 Fabian Light MD  Department of Hospital Medicine   [facility]

## 2025-02-01 NOTE — H&P
[facility] Medicine  History & Physical    Patient Name: Clarita Gómez  MRN: [id number]  Patient Class: IP- Inpatient  Admission Date: (Not on file)  Attending Physician: Fabian Light MD   Primary Care Provider: Bassam Givens MD         Patient information was obtained from relative(s) and past medical records.     Subjective:     Principal Problem:Comfort measures only status    Chief Complaint:   Chief Complaint   Patient presents with    COPD        HPI: Pt is a 69-year-old female smoker with chronic respiratory failure who was admitted for COPD exacerbation for which symptoms worsened despite treatment and she desired to pursue comfort measures only. She was transitioned to IP hospice for this encounter. Family bedside.    Past Medical History:   Diagnosis Date    Anticardiolipin antibody positive     COPD (chronic obstructive pulmonary disease)     Hyperlipidemia     Hypothyroid     Personal history of tobacco use, presenting hazards to health     Splenic infarct        Past Surgical History:   Procedure Laterality Date    CHOLECYSTECTOMY      COLECTOMY      partial due to diverticulosis    COLONOSCOPY N/A 12/28/2023    Procedure: COLONOSCOPY;  Surgeon: Magdi Adames MD;  Location: [facility];  Service: Endoscopy;  Laterality: N/A;    HYSTERECTOMY      LAPAROSCOPIC REPAIR OF VENTRAL HERNIA      SMALL BOWEL ENTEROSCOPY N/A 12/28/2023    Procedure: PUSH ENTEROSCOPY;  Surgeon: Magdi Adames MD;  Location: [facility];  Service: Endoscopy;  Laterality: N/A;       Review of patient's allergies indicates:   Allergen Reactions    Zosyn [piperacillin-tazobactam] Swelling     Swelling to lips       Current Facility-Administered Medications on File Prior to Encounter   Medication    ALPRAZolam tablet 0.5 mg    ipratropium 0.02 % nebulizer solution 0.5 mg    levalbuterol nebulizer solution 1.25 mg    LORazepam injection 2 mg    morphine 100 mg (1 mg/mL) in NACL 100 mL infusion (TITRATING)     [DISCONTINUED] amLODIPine tablet 10 mg    [DISCONTINUED] apixaban tablet 5 mg    [DISCONTINUED] arformoteroL nebulizer solution 15 mcg    [DISCONTINUED] azithromycin tablet 500 mg    [DISCONTINUED] budesonide nebulizer solution 0.5 mg    [DISCONTINUED] famotidine tablet 20 mg    [DISCONTINUED] hydrALAZINE injection 10 mg    [DISCONTINUED] labetaloL injection 10 mg    [DISCONTINUED] levalbuterol nebulizer solution 1.25 mg    [DISCONTINUED] magnesium oxide tablet 800 mg    [DISCONTINUED] magnesium oxide tablet 800 mg    [DISCONTINUED] methylPREDNISolone sodium succinate injection 60 mg    [DISCONTINUED] potassium bicarbonate disintegrating tablet 35 mEq    [DISCONTINUED] potassium bicarbonate disintegrating tablet 50 mEq    [DISCONTINUED] potassium bicarbonate disintegrating tablet 60 mEq    [DISCONTINUED] potassium, sodium phosphates 280-160-250 mg packet 2 packet    [DISCONTINUED] potassium, sodium phosphates 280-160-250 mg packet 2 packet    [DISCONTINUED] potassium, sodium phosphates 280-160-250 mg packet 2 packet    [DISCONTINUED] sodium chloride 0.9% flush 3 mL     Current Outpatient Medications on File Prior to Encounter   Medication Sig    albuterol (VENTOLIN HFA) 90 mcg/actuation inhaler Inhale 2 puffs into the lungs every 6 (six) hours as needed for Wheezing. Rescue    cyanocobalamin 1,000 mcg/mL injection Inject 1 ml B 12 subq q 2 weeks. (Patient taking differently: Inject 1,000 mcg into the muscle every 14 (fourteen) days. Inject 1 ml B 12 subq q 2 weeks.)    ELIQUIS 5 mg Tab TAKE ONE TABLET BY MOUTH TWO TIMES A DAY    fluticasone-umeclidin-vilanter (TRELEGY ELLIPTA) 100-62.5-25 mcg DsDv Inhale 1 puff into the lungs once daily.    levothyroxine (SYNTHROID) 100 MCG tablet Take 1 tablet by mouth once daily.    pravastatin (PRAVACHOL) 40 MG tablet Take 40 mg by mouth once daily.    prenatal 105-iron-folic ac-dha 30 mg iron- 1.4 mg-300 mg Cmpk Take 1 tablet by mouth once daily.    syringe with needle  "(TUBERCULIN SYRINGE) 1 mL 27 x 1/2" Syrg Use for B 12 subq monthly injections.     Family History       Problem Relation (Age of Onset)    Breast cancer Maternal Aunt    Heart disease Father    Lung cancer Brother    Ovarian cancer Sister          Tobacco Use    Smoking status: Every Day     Current packs/day: 0.50     Average packs/day: 0.5 packs/day for 40.0 years (20.0 ttl pk-yrs)     Types: Cigarettes    Smokeless tobacco: Never    Tobacco comments:     currently smoking 1/2 PPD     Smokes 10 cigarettes daily   Substance and Sexual Activity    Alcohol use: No    Drug use: No    Sexual activity: Not on file       Objective:     Vital Signs (Most Recent):    Vital Signs (24h Range):  Temp:  [97 °F (36.1 °C)-97.8 °F (36.6 °C)] 97.8 °F (36.6 °C)  Pulse:  [] 106  Resp:  [11-30] 11  SpO2:  [88 %-99 %] 88 %  BP: (117-182)/(55-79) 157/71        There is no height or weight on file to calculate BMI. See prior encounter     Physical Exam  Vitals reviewed.   Constitutional:       General: She is not in acute distress.     Appearance: She is ill-appearing.      Comments: BiPAP   HENT:      Head: Normocephalic and atraumatic.   Cardiovascular:      Rate and Rhythm: Regular rhythm. Tachycardia present.   Pulmonary:      Effort: Pulmonary effort is normal. No respiratory distress.   Neurological:      General: No focal deficit present.      Comments: Lethargic                Significant Labs: All pertinent labs within the past 24 hours have been reviewed.    Significant Imaging: I have reviewed all pertinent imaging results/findings within the past 24 hours.  Assessment/Plan:     * Comfort measures only status  IP hospice  Morphine gtt  Prn ativan   O2/bipap prn    Acute on chronic respiratory failure with hypoxia and hypercapnia  Comfort measures only now    COPD exacerbation  Comfort measures only      VTE Risk Mitigation (From admission, onward)      None                            Fabian Light MD  Department of " [facility]  [facility]

## 2025-02-02 NOTE — PROGRESS NOTES
[facility] Medicine  Progress Note    Patient Name: Clarita Gómez  MRN: [id number]  Patient Class: IP- Hospice   Admission Date: 2/1/2025  Length of Stay: 1 days  Attending Physician: Fabian Light MD  Primary Care Provider: Bassam Givens MD        Subjective     Principal Problem:Comfort measures only status        HPI:  Pt is a 69-year-old female smoker with chronic respiratory failure who was admitted for COPD exacerbation for which symptoms worsened despite treatment and she desired to pursue comfort measures only. She was transitioned to IP hospice for this encounter. Family bedside.    Overview/Hospital Course:  Admitted under inpatient hospice for acute on chronic respiratory failure from end-stage COPD.    Interval History:  Seen on a.m. rounds.  She is unresponsive on continuous morphine drip.  Daughter and son bedside.      Objective:     Vital Signs (Most Recent):  Temp: 97 °F (36.1 °C) (02/01/25 2015)  Pulse: 95 (02/02/25 1100)  Resp: 13 (02/02/25 1100)  BP: (!) 103/59 (02/02/25 0800)  SpO2: (!) 86 % (02/02/25 1100) Vital Signs (24h Range):  Temp:  [97 °F (36.1 °C)] 97 °F (36.1 °C)  Pulse:  [] 95  Resp:  [8-13] 13  SpO2:  [78 %-86 %] 86 %  BP: (103-120)/(56-59) 103/59        There is no height or weight on file to calculate BMI.    Intake/Output Summary (Last 24 hours) at 2/2/2025 1515  Last data filed at 2/2/2025 1415  Gross per 24 hour   Intake 0 ml   Output --   Net 0 ml         Physical Exam  Vitals reviewed.   Constitutional:       General: She is not in acute distress.     Appearance: She is ill-appearing.      Comments: Nasal cannula   HENT:      Head: Normocephalic and atraumatic.   Cardiovascular:      Rate and Rhythm: Regular rhythm. Tachycardia present.   Pulmonary:      Effort: Pulmonary effort is normal. No respiratory distress.   Neurological:      Comments: Mostly unresponsive             Significant Labs: All pertinent labs within the past 24 hours have  been reviewed.    Significant Imaging: I have reviewed all pertinent imaging results/findings within the past 24 hours.    Assessment and Plan     * Comfort measures only status  IP hospice  Morphine gtt  Prn ativan   O2/bipap prn    Acute on chronic respiratory failure with hypoxia and hypercapnia  Comfort measures only now    COPD exacerbation  Comfort measures only      VTE Risk Mitigation (From admission, onward)      None            Discharge Planning   ABRAN: 2/4/2025     Code Status: DNR   Medical Readiness for Discharge Date:   Discharge Plan A: Inpatient Hospice                        Fabian Light MD  Department of Hospital Medicine   [facility]

## 2025-02-02 NOTE — HOSPITAL COURSE
Admitted under inpatient hospice for acute on chronic respiratory failure from end-stage COPD. Received comfort-focused care. Patient  at 7:05 AM on 25.

## 2025-02-02 NOTE — PLAN OF CARE
Pt is currently in inpatient hospice with [facility].       02/02/25 0941   Discharge Reassessment   Discharge Plan A Inpatient Hospice   Discharge Plan B Inpatient Hospice

## 2025-02-02 NOTE — CARE UPDATE
02/02/25 0751   Patient Assessment/Suction   Level of Consciousness (AVPU) unresponsive   Respiratory Effort Mouth breathing;Gasping   All Lung Fields Breath Sounds diminished   Rhythm/Pattern, Respiratory bradypneic   Cough Frequency no cough   PRE-TX-O2   Device (Oxygen Therapy) high flow nasal cannula   $ Is the patient on Low Flow Oxygen? Yes   SpO2 (!) 81 %   Pulse Oximetry Type Continuous   $ Pulse Oximetry - Multiple Charge Pulse Oximetry - Multiple   Pulse 95   Resp 11   Aerosol Therapy   $ Aerosol Therapy Charges Aerosol Treatment   Daily Review of Necessity (SVN) completed   Respiratory Treatment Status (SVN) given   Treatment Route (SVN) mask;oxygen   Patient Position HOB elevated   Post Treatment Assessment (SVN) breath sounds unchanged   Signs of Intolerance (SVN) none   Breath Sounds Post-Respiratory Treatment   Throughout All Fields Post-Treatment All Fields   Throughout All Fields Post-Treatment no change   Post-treatment Heart Rate (beats/min) 92   Post-treatment Resp Rate (breaths/min) 13   Education   $ Education Bronchodilator;15 min

## 2025-02-02 NOTE — PLAN OF CARE
[facility]  Initial Discharge Assessment     CM staff with attending doctor. Pt is currently in inpatient hospice with [facility].     Primary Care Provider: Bassam Givens MD    Admission Diagnosis: Acute on chronic respiratory failure with hypoxia [J96.21]    Admission Date: 2/1/2025  Expected Discharge Date: 2/2/2025         Payor: [employer] / Plan: [employer] / Product Type: Guarantor 3rd Party /     Extended Emergency Contact Information  Primary Emergency Contact: JOHN SANCHEZ  [place] Phone: [phone]  Mobile Phone: [phone]  Relation: Son   needed? No    Discharge Plan A: Inpatient Hospice  Discharge Plan B: Inpatient Hospice      [facility] [address] [address]  [address] [address]  [facility] [address]  Phone: [phone] Fax: [phone]

## 2025-02-02 NOTE — ACP (ADVANCE CARE PLANNING)
Advance Care Planning     Date: 02/01/2025    Today a voluntary meeting took place: bedside    Patient Participation: Patient is unable to participate     Attendees (Name and  Relationship to patient): daughter, son    Staff attendees (Name and  Role): myself    ACP Conversation (General): transition to comfort care    Code Status: DNR; status confirmed/order placed in chart     ACP Documents: none    Goals of care: Reconfirm that comfort care only is desired as previously expressed by patient      Recommendations/  Follow-up tasks: Admit to IP hospice      Length of ACP   conversation in minutes: 16 minutes

## 2025-02-02 NOTE — SUBJECTIVE & OBJECTIVE
Interval History:  Seen on a.m. rounds.  She is unresponsive on continuous morphine drip.  Daughter and son bedside.      Objective:     Vital Signs (Most Recent):  Temp: 97 °F (36.1 °C) (02/01/25 2015)  Pulse: 95 (02/02/25 1100)  Resp: 13 (02/02/25 1100)  BP: (!) 103/59 (02/02/25 0800)  SpO2: (!) 86 % (02/02/25 1100) Vital Signs (24h Range):  Temp:  [97 °F (36.1 °C)] 97 °F (36.1 °C)  Pulse:  [] 95  Resp:  [8-13] 13  SpO2:  [78 %-86 %] 86 %  BP: (103-120)/(56-59) 103/59        There is no height or weight on file to calculate BMI.    Intake/Output Summary (Last 24 hours) at 2/2/2025 1515  Last data filed at 2/2/2025 1415  Gross per 24 hour   Intake 0 ml   Output --   Net 0 ml         Physical Exam  Vitals reviewed.   Constitutional:       General: She is not in acute distress.     Appearance: She is ill-appearing.      Comments: Nasal cannula   HENT:      Head: Normocephalic and atraumatic.   Cardiovascular:      Rate and Rhythm: Regular rhythm. Tachycardia present.   Pulmonary:      Effort: Pulmonary effort is normal. No respiratory distress.   Neurological:      Comments: Mostly unresponsive             Significant Labs: All pertinent labs within the past 24 hours have been reviewed.    Significant Imaging: I have reviewed all pertinent imaging results/findings within the past 24 hours.

## 2025-02-03 NOTE — PLAN OF CARE
02/02/25 2133   Patient Assessment/Suction   Level of Consciousness (AVPU) unresponsive   PRE-TX-O2   Device (Oxygen Therapy) room air   SpO2 (!) 61 %   Pulse Oximetry Type Continuous   $ Pulse Oximetry - Multiple Charge Pulse Oximetry - Multiple   Pulse 103   Resp 14   Aerosol Therapy   $ Aerosol Therapy Charges PRN treatment not required     Comfort measures only

## 2025-02-03 NOTE — PROGRESS NOTES
[facility] Medicine  Progress Note    Patient Name: Clarita Gómez  MRN: [id number]  Patient Class: IP- Hospice   Admission Date: 2/1/2025  Length of Stay: 2 days  Attending Physician: Fabian Light MD  Primary Care Provider: Bassam Givens MD        Subjective     Principal Problem:Comfort measures only status        HPI:  Pt is a 69-year-old female smoker with chronic respiratory failure who was admitted for COPD exacerbation for which symptoms worsened despite treatment and she desired to pursue comfort measures only. She was transitioned to IP hospice for this encounter. Family bedside.    Overview/Hospital Course:  Admitted under inpatient hospice for acute on chronic respiratory failure from end-stage COPD.    Interval History:  Seen on a.m. rounds.  Unresponsive.  Son bedside.  O2 sats in the 50s.  She has been kept comfortable      Objective:     Vital Signs (Most Recent):  Temp: 98.2 °F (36.8 °C) (02/03/25 0701)  Pulse: (!) 112 (02/03/25 1301)  Resp: (!) 24 (02/03/25 1301)  BP: (!) 90/54 (02/03/25 1101)  SpO2: (!) 58 % (02/03/25 1301) Vital Signs (24h Range):  Temp:  [96.8 °F (36 °C)-98.2 °F (36.8 °C)] 98.2 °F (36.8 °C)  Pulse:  [] 112  Resp:  [8-24] 24  SpO2:  [56 %-78 %] 58 %  BP: ()/(49-54) 90/54        There is no height or weight on file to calculate BMI.    Intake/Output Summary (Last 24 hours) at 2/3/2025 1452  Last data filed at 2/3/2025 1144  Gross per 24 hour   Intake 102.08 ml   Output 0 ml   Net 102.08 ml         Physical Exam  Vitals reviewed.   Constitutional:       General: She is not in acute distress.     Appearance: She is ill-appearing.      Comments: Nasal cannula   HENT:      Head: Normocephalic and atraumatic.   Cardiovascular:      Rate and Rhythm: Regular rhythm. Tachycardia present.   Pulmonary:      Effort: Pulmonary effort is normal. No respiratory distress.   Neurological:      Comments: Mostly unresponsive             Significant Labs: All  pertinent labs within the past 24 hours have been reviewed.    Significant Imaging: I have reviewed all pertinent imaging results/findings within the past 24 hours.    Assessment and Plan     * Comfort measures only status  IP hospice  Morphine gtt  Scopolamine patch  Prn ativan   O2 prn    Acute on chronic respiratory failure with hypoxia and hypercapnia  Comfort measures only now    COPD exacerbation  Comfort measures only      VTE Risk Mitigation (From admission, onward)      None            Discharge Planning   ABRAN: 2/4/2025     Code Status: DNR   Medical Readiness for Discharge Date:   Discharge Plan A: Inpatient Hospice                        Fabian Light MD  Department of Hospital Medicine   [facility]

## 2025-02-03 NOTE — SUBJECTIVE & OBJECTIVE
Interval History:  Seen on a.m. rounds.  Unresponsive.  Son bedside.  O2 sats in the 50s.  She has been kept comfortable      Objective:     Vital Signs (Most Recent):  Temp: 98.2 °F (36.8 °C) (02/03/25 0701)  Pulse: (!) 112 (02/03/25 1301)  Resp: (!) 24 (02/03/25 1301)  BP: (!) 90/54 (02/03/25 1101)  SpO2: (!) 58 % (02/03/25 1301) Vital Signs (24h Range):  Temp:  [96.8 °F (36 °C)-98.2 °F (36.8 °C)] 98.2 °F (36.8 °C)  Pulse:  [] 112  Resp:  [8-24] 24  SpO2:  [56 %-78 %] 58 %  BP: ()/(49-54) 90/54        There is no height or weight on file to calculate BMI.    Intake/Output Summary (Last 24 hours) at 2/3/2025 1452  Last data filed at 2/3/2025 1144  Gross per 24 hour   Intake 102.08 ml   Output 0 ml   Net 102.08 ml         Physical Exam  Vitals reviewed.   Constitutional:       General: She is not in acute distress.     Appearance: She is ill-appearing.      Comments: Nasal cannula   HENT:      Head: Normocephalic and atraumatic.   Cardiovascular:      Rate and Rhythm: Regular rhythm. Tachycardia present.   Pulmonary:      Effort: Pulmonary effort is normal. No respiratory distress.   Neurological:      Comments: Mostly unresponsive             Significant Labs: All pertinent labs within the past 24 hours have been reviewed.    Significant Imaging: I have reviewed all pertinent imaging results/findings within the past 24 hours.

## 2025-02-04 NOTE — NURSING
RN called [facility] and spoke with Frank. Frank states they will have the hospice nurse call the unit to give information to this RN and also will fax their documentation to us for patient medical record. All post mortem care addressed by Asuncion RN. Body released to  home. Charge nurse Fela present. Remaining morphine wasted with witness Noemy Barrett RN (73mL) and discarded according to protocol.

## 2025-02-04 NOTE — DISCHARGE SUMMARY
[facility] Medicine  Discharge Summary      Patient Name: Clarita Gómez  MRN: [id number]  MAUREEN: [id number]  Patient Class: IP- Hospice  Admission Date: 2025  Hospital Length of Stay: 3 days  Discharge Date and Time: 7:05 AM 25  Attending Physician: Fabian Light MD   Discharging Provider: Fabian Light MD  Primary Care Provider: Bassam Givens MD    Primary Care Team: Networked reference to record PCT     HPI:   Pt is a 69-year-old female smoker with chronic respiratory failure who was admitted for COPD exacerbation for which symptoms worsened despite treatment and she desired to pursue comfort measures only. She was transitioned to IP hospice for this encounter. Family bedside.          Hospital Course:   Admitted under inpatient hospice for acute on chronic respiratory failure from end-stage COPD. Received comfort-focused care. Patient  at 7:05 AM on 25.      Goals of Care Treatment Preferences:  Code Status: DNR         Consults: IP hospice      Final Active Diagnoses:    Diagnosis Date Noted POA    PRINCIPAL PROBLEM:  Comfort measures only status [Z51.5] 2025 Not Applicable    Acute on chronic respiratory failure with hypoxia and hypercapnia [J96.21, J96.22] 2025 Yes    COPD exacerbation [J44.1] 2025 Yes      Problems Resolved During this Admission:       Discharged Condition:     Significant Diagnostic Studies:   Please see EPIC EMR for all studies on this now  patient.      Time spent on the discharge of patient: 31 minutes         Fabian Light MD  Department of Hospital Medicine  [facility]

## 2025-02-04 NOTE — PLAN OF CARE
Pt passed away with inpatient hospice. No further needs known.     02/04/25 0800   Final Note   Assessment Type Final Discharge Note   Anticipated Discharge Disposition Hospice   What phone number can be called within the next 1-3 days to see how you are doing after discharge? [phone]   Post-Acute Status   Discharge Delays None known at this time

## 2025-02-04 NOTE — NURSING
Hospice nurse spoke with family. Per hospice nurse, family does not want [place] [facility] [place]. Patient released to  home.

## 2025-02-04 NOTE — NURSING
At 0635, this nurse went into patient's room to reassess patient after administration of prn Ativan at 0622. Pt lying in bed in upright position with eyes closed. Son and daughter at bedside. At 0636, telemetry monitor read asystole. No spontaneous breathing noted. No heart sounds auscultated. Secure chat sent to Dr. Light and Dr. Qureshi. Charge nurse, Fela contacted [facility].

## 2025-02-04 NOTE — SIGNIFICANT EVENT
DEATH NOTE     Nursing report absent activity on telemetry monitoring. Patient is seen and examined to confirm death. Patient has no auscultated breath or heart sounds.  Patient is pronounced dead at 7:05 AM on 2/4/25. Marc Gillespie RN as witness. Family bedside.     Fabian Light MD  [facility] Medicine